# Patient Record
Sex: MALE | Race: WHITE | NOT HISPANIC OR LATINO | Employment: FULL TIME | URBAN - METROPOLITAN AREA
[De-identification: names, ages, dates, MRNs, and addresses within clinical notes are randomized per-mention and may not be internally consistent; named-entity substitution may affect disease eponyms.]

---

## 2017-01-01 ENCOUNTER — COMMUNICATION - HEALTHEAST (OUTPATIENT)
Dept: ONCOLOGY | Facility: HOSPITAL | Age: 32
End: 2017-01-01

## 2017-01-01 ENCOUNTER — INFUSION - HEALTHEAST (OUTPATIENT)
Dept: INFUSION THERAPY | Facility: CLINIC | Age: 32
End: 2017-01-01

## 2017-01-01 ENCOUNTER — OFFICE VISIT - HEALTHEAST (OUTPATIENT)
Dept: RADIATION ONCOLOGY | Age: 32
End: 2017-01-01

## 2017-01-01 ENCOUNTER — OFFICE VISIT - HEALTHEAST (OUTPATIENT)
Dept: ONCOLOGY | Facility: HOSPITAL | Age: 32
End: 2017-01-01

## 2017-01-01 ENCOUNTER — AMBULATORY - HEALTHEAST (OUTPATIENT)
Dept: ONCOLOGY | Facility: CLINIC | Age: 32
End: 2017-01-01

## 2017-01-01 ENCOUNTER — COMMUNICATION - HEALTHEAST (OUTPATIENT)
Dept: FAMILY MEDICINE | Facility: CLINIC | Age: 32
End: 2017-01-01

## 2017-01-01 ENCOUNTER — HOSPITAL ENCOUNTER (OUTPATIENT)
Dept: CT IMAGING | Facility: CLINIC | Age: 32
Discharge: HOME | End: 2017-08-30
Attending: INTERNAL MEDICINE

## 2017-01-01 ENCOUNTER — RECORDS - HEALTHEAST (OUTPATIENT)
Dept: ADMINISTRATIVE | Facility: OTHER | Age: 32
End: 2017-01-01

## 2017-01-01 ENCOUNTER — AMBULATORY - HEALTHEAST (OUTPATIENT)
Dept: ONCOLOGY | Facility: HOSPITAL | Age: 32
End: 2017-01-01

## 2017-01-01 ENCOUNTER — AMBULATORY - HEALTHEAST (OUTPATIENT)
Dept: INFUSION THERAPY | Facility: CLINIC | Age: 32
End: 2017-01-01

## 2017-01-01 ENCOUNTER — COMMUNICATION - HEALTHEAST (OUTPATIENT)
Dept: ONCOLOGY | Facility: CLINIC | Age: 32
End: 2017-01-01

## 2017-01-01 ENCOUNTER — COMMUNICATION - HEALTHEAST (OUTPATIENT)
Dept: ADMINISTRATIVE | Facility: HOSPITAL | Age: 32
End: 2017-01-01

## 2017-01-01 ENCOUNTER — OFFICE VISIT - HEALTHEAST (OUTPATIENT)
Dept: FAMILY MEDICINE | Facility: CLINIC | Age: 32
End: 2017-01-01

## 2017-01-01 ENCOUNTER — AMBULATORY - HEALTHEAST (OUTPATIENT)
Dept: RADIATION ONCOLOGY | Facility: HOSPITAL | Age: 32
End: 2017-01-01

## 2017-01-01 ENCOUNTER — HOSPITAL ENCOUNTER (OUTPATIENT)
Dept: PET IMAGING | Facility: HOSPITAL | Age: 32
Discharge: HOME | End: 2017-06-07
Attending: INTERNAL MEDICINE

## 2017-01-01 ENCOUNTER — OFFICE VISIT - HEALTHEAST (OUTPATIENT)
Dept: ONCOLOGY | Facility: CLINIC | Age: 32
End: 2017-01-01

## 2017-01-01 ENCOUNTER — HOSPITAL ENCOUNTER (OUTPATIENT)
Dept: MRI IMAGING | Facility: HOSPITAL | Age: 32
Discharge: HOME | End: 2017-06-07
Attending: INTERNAL MEDICINE

## 2017-01-01 ENCOUNTER — COMMUNICATION - HEALTHEAST (OUTPATIENT)
Dept: INFUSION THERAPY | Facility: CLINIC | Age: 32
End: 2017-01-01

## 2017-01-01 ENCOUNTER — SURGERY - HEALTHEAST (OUTPATIENT)
Dept: SURGERY | Facility: CLINIC | Age: 32
End: 2017-01-01

## 2017-01-01 ENCOUNTER — COMMUNICATION - HEALTHEAST (OUTPATIENT)
Dept: RADIATION ONCOLOGY | Age: 32
End: 2017-01-01

## 2017-01-01 ENCOUNTER — OFFICE VISIT - HEALTHEAST (OUTPATIENT)
Dept: OTOLARYNGOLOGY | Facility: CLINIC | Age: 32
End: 2017-01-01

## 2017-01-01 ENCOUNTER — INFUSION - HEALTHEAST (OUTPATIENT)
Dept: INFUSION THERAPY | Facility: HOSPITAL | Age: 32
End: 2017-01-01

## 2017-01-01 ENCOUNTER — HOSPITAL ENCOUNTER (OUTPATIENT)
Dept: MRI IMAGING | Facility: CLINIC | Age: 32
Discharge: HOME | End: 2017-02-22
Attending: RADIOLOGY

## 2017-01-01 ENCOUNTER — AMBULATORY - HEALTHEAST (OUTPATIENT)
Dept: INFUSION THERAPY | Facility: HOSPITAL | Age: 32
End: 2017-01-01

## 2017-01-01 ENCOUNTER — HOSPITAL ENCOUNTER (OUTPATIENT)
Dept: MRI IMAGING | Facility: CLINIC | Age: 32
Discharge: HOME | End: 2017-09-18
Attending: RADIOLOGY

## 2017-01-01 ENCOUNTER — HOSPITAL ENCOUNTER (OUTPATIENT)
Dept: CT IMAGING | Facility: CLINIC | Age: 32
Discharge: HOME | End: 2017-09-18
Attending: RADIOLOGY

## 2017-01-01 ENCOUNTER — OFFICE VISIT - HEALTHEAST (OUTPATIENT)
Dept: ENDOCRINOLOGY | Facility: CLINIC | Age: 32
End: 2017-01-01

## 2017-01-01 ENCOUNTER — HOSPITAL ENCOUNTER (OUTPATIENT)
Dept: MRI IMAGING | Facility: CLINIC | Age: 32
Setting detail: RADIATION/ONCOLOGY SERIES
Discharge: STILL ADMITTED OR ADMINISTRATIVE | End: 2017-08-01
Attending: RADIOLOGY

## 2017-01-01 ENCOUNTER — HOSPITAL ENCOUNTER (OUTPATIENT)
Dept: MRI IMAGING | Facility: CLINIC | Age: 32
Discharge: HOME | End: 2017-11-27
Attending: RADIOLOGY

## 2017-01-01 ENCOUNTER — ANESTHESIA - HEALTHEAST (OUTPATIENT)
Dept: SURGERY | Facility: CLINIC | Age: 32
End: 2017-01-01

## 2017-01-01 ENCOUNTER — OFFICE VISIT - HEALTHEAST (OUTPATIENT)
Dept: RADIATION ONCOLOGY | Facility: CLINIC | Age: 32
End: 2017-01-01

## 2017-01-01 ENCOUNTER — COMMUNICATION - HEALTHEAST (OUTPATIENT)
Dept: ENDOCRINOLOGY | Facility: CLINIC | Age: 32
End: 2017-01-01

## 2017-01-01 ENCOUNTER — AMBULATORY - HEALTHEAST (OUTPATIENT)
Dept: RADIATION ONCOLOGY | Age: 32
End: 2017-01-01

## 2017-01-01 ENCOUNTER — COMMUNICATION - HEALTHEAST (OUTPATIENT)
Dept: OTOLARYNGOLOGY | Facility: CLINIC | Age: 32
End: 2017-01-01

## 2017-01-01 ENCOUNTER — HOSPITAL ENCOUNTER (OUTPATIENT)
Dept: CT IMAGING | Facility: HOSPITAL | Age: 32
Setting detail: RADIATION/ONCOLOGY SERIES
Discharge: STILL ADMITTED OR ADMINISTRATIVE | End: 2017-11-22
Attending: INTERNAL MEDICINE

## 2017-01-01 ENCOUNTER — OFFICE VISIT - HEALTHEAST (OUTPATIENT)
Dept: RADIATION ONCOLOGY | Facility: HOSPITAL | Age: 32
End: 2017-01-01

## 2017-01-01 ENCOUNTER — COMMUNICATION - HEALTHEAST (OUTPATIENT)
Dept: RADIATION ONCOLOGY | Facility: CLINIC | Age: 32
End: 2017-01-01

## 2017-01-01 ENCOUNTER — COMMUNICATION - HEALTHEAST (OUTPATIENT)
Dept: SCHEDULING | Facility: CLINIC | Age: 32
End: 2017-01-01

## 2017-01-01 ENCOUNTER — RECORDS - HEALTHEAST (OUTPATIENT)
Dept: GENERAL RADIOLOGY | Facility: CLINIC | Age: 32
End: 2017-01-01

## 2017-01-01 ENCOUNTER — COMMUNICATION - HEALTHEAST (OUTPATIENT)
Dept: INFUSION THERAPY | Facility: HOSPITAL | Age: 32
End: 2017-01-01

## 2017-01-01 ENCOUNTER — AMBULATORY - HEALTHEAST (OUTPATIENT)
Dept: RADIATION ONCOLOGY | Facility: CLINIC | Age: 32
End: 2017-01-01

## 2017-01-01 DIAGNOSIS — T45.1X5A CHEMOTHERAPY INDUCED NEUTROPENIA (H): ICD-10-CM

## 2017-01-01 DIAGNOSIS — C34.90 NON-SMALL CELL LUNG CANCER, UNSPECIFIED LATERALITY (H): ICD-10-CM

## 2017-01-01 DIAGNOSIS — C34.90 MALIGNANT NEOPLASM OF LUNG, UNSPECIFIED LATERALITY, UNSPECIFIED PART OF LUNG (H): ICD-10-CM

## 2017-01-01 DIAGNOSIS — C79.31 BRAIN METASTASIS: ICD-10-CM

## 2017-01-01 DIAGNOSIS — E27.40 ADRENAL INSUFFICIENCY (H): ICD-10-CM

## 2017-01-01 DIAGNOSIS — E03.2 HYPOTHYROIDISM DUE TO MEDICATION: ICD-10-CM

## 2017-01-01 DIAGNOSIS — C34.91 NON-SMALL CELL CANCER OF RIGHT LUNG (H): ICD-10-CM

## 2017-01-01 DIAGNOSIS — C34.90: ICD-10-CM

## 2017-01-01 DIAGNOSIS — I26.99 PULMONARY EMBOLISM ON RIGHT (H): ICD-10-CM

## 2017-01-01 DIAGNOSIS — G89.3 CANCER RELATED PAIN: ICD-10-CM

## 2017-01-01 DIAGNOSIS — C79.31 METASTASIS TO BRAIN (H): ICD-10-CM

## 2017-01-01 DIAGNOSIS — F32.A DEPRESSION: ICD-10-CM

## 2017-01-01 DIAGNOSIS — C34.91 NON-SMALL CELL LUNG CANCER, RIGHT (H): ICD-10-CM

## 2017-01-01 DIAGNOSIS — C79.49 SECONDARY MALIGNANT NEOPLASM OF BRAIN AND SPINAL CORD (H): ICD-10-CM

## 2017-01-01 DIAGNOSIS — E86.0 DEHYDRATION: ICD-10-CM

## 2017-01-01 DIAGNOSIS — G89.3 CANCER ASSOCIATED PAIN: ICD-10-CM

## 2017-01-01 DIAGNOSIS — C34.91 PRIMARY MALIGNANT NEOPLASM OF RIGHT LUNG METASTATIC TO OTHER SITE (H): ICD-10-CM

## 2017-01-01 DIAGNOSIS — K81.0 CHOLECYSTITIS, ACUTE: ICD-10-CM

## 2017-01-01 DIAGNOSIS — R05.9 COUGH: ICD-10-CM

## 2017-01-01 DIAGNOSIS — R13.19 ESOPHAGEAL DYSPHAGIA: ICD-10-CM

## 2017-01-01 DIAGNOSIS — R09.02 HYPOXIA: ICD-10-CM

## 2017-01-01 DIAGNOSIS — I26.99 PULMONARY EMBOLISM (H): ICD-10-CM

## 2017-01-01 DIAGNOSIS — D64.89 ANEMIA DUE TO OTHER CAUSE: ICD-10-CM

## 2017-01-01 DIAGNOSIS — E83.42 HYPOMAGNESEMIA: ICD-10-CM

## 2017-01-01 DIAGNOSIS — I26.99 OTHER ACUTE PULMONARY EMBOLISM WITHOUT ACUTE COR PULMONALE (H): ICD-10-CM
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
## 2017-01-01 DIAGNOSIS — R06.02 SHORTNESS OF BREATH: ICD-10-CM

## 2017-01-01 DIAGNOSIS — I67.89 RADIATION THERAPY INDUCED BRAIN NECROSIS: ICD-10-CM

## 2017-01-01 DIAGNOSIS — C34.91 LUNG CANCER, PRIMARY, WITH METASTASIS FROM LUNG TO OTHER SITE, RIGHT (H): ICD-10-CM

## 2017-01-01 DIAGNOSIS — R19.7 DIARRHEA: ICD-10-CM

## 2017-01-01 DIAGNOSIS — D70.1 CHEMOTHERAPY INDUCED NEUTROPENIA (H): ICD-10-CM

## 2017-01-01 DIAGNOSIS — K22.2 ESOPHAGEAL STRICTURE: ICD-10-CM

## 2017-01-01 DIAGNOSIS — R65.20 SEVERE SEPSIS (H): ICD-10-CM

## 2017-01-01 DIAGNOSIS — R07.9 CHEST PAIN: ICD-10-CM

## 2017-01-01 DIAGNOSIS — J96.11 CHRONIC RESPIRATORY FAILURE WITH HYPOXIA (H): ICD-10-CM

## 2017-01-01 DIAGNOSIS — D64.9 ANEMIA: ICD-10-CM

## 2017-01-01 DIAGNOSIS — C79.31 SECONDARY MALIGNANT NEOPLASM OF BRAIN AND SPINAL CORD (H): ICD-10-CM

## 2017-01-01 DIAGNOSIS — A04.72 C. DIFFICILE COLITIS: ICD-10-CM

## 2017-01-01 DIAGNOSIS — A41.9 SEVERE SEPSIS (H): ICD-10-CM

## 2017-01-01 DIAGNOSIS — E87.6 HYPOKALEMIA: ICD-10-CM

## 2017-01-01 DIAGNOSIS — Z09 CHEMOTHERAPY FOLLOW-UP EXAMINATION: ICD-10-CM

## 2017-01-01 DIAGNOSIS — C34.90 LUNG CANCER (H): ICD-10-CM

## 2017-01-01 DIAGNOSIS — C78.02 METASTATIC LUNG CARCINOMA, LEFT (H): ICD-10-CM

## 2017-01-01 DIAGNOSIS — R50.9 FEVER: ICD-10-CM

## 2017-01-01 DIAGNOSIS — J18.9 PNEUMONIA: ICD-10-CM

## 2017-01-01 DIAGNOSIS — M25.50 POLYARTHRALGIA: ICD-10-CM

## 2017-01-01 DIAGNOSIS — C79.31 BRAIN METASTASES: ICD-10-CM

## 2017-01-01 DIAGNOSIS — E55.9 VITAMIN D DEFICIENCY: ICD-10-CM

## 2017-01-01 DIAGNOSIS — C34.90 NON-SMALL CELL LUNG CANCER (NSCLC) (H): ICD-10-CM

## 2017-01-01 LAB
BLD PROD TYP BPU: NORMAL
BLOOD EXPIRATION DATE: NORMAL
BLOOD TYPE: 600
CODING SYSTEM: NORMAL
COMPONENT (HISTORICAL CONVERSION): NORMAL
CREAT SERPL-MCNC: 0.82 MG/DL (ref 0.7–1.3)
CREAT SERPL-MCNC: 0.95 MG/DL (ref 0.7–1.3)
CROSSMATCH: NORMAL
GFR SERPL CREATININE-BSD FRML MDRD: >60 ML/MIN/1.73M2
GFR SERPL CREATININE-BSD FRML MDRD: >60 ML/MIN/1.73M2
HBA1C MFR BLD: 5.5 % (ref 3.5–6)
ISSUE DATE AND TIME: NORMAL
STATUS (HISTORICAL CONVERSION): NORMAL
UNIT ABO/RH (HISTORICAL CONVERSION): NORMAL
UNIT NUMBER: NORMAL

## 2017-01-01 ASSESSMENT — MIFFLIN-ST. JEOR
SCORE: 2035.3
SCORE: 2112.98
SCORE: 2101.52
SCORE: 2107.87
SCORE: 2078.84
SCORE: 2136
SCORE: 2098.8
SCORE: 2058.88
SCORE: 2139.91
SCORE: 2117.4
SCORE: 1969.98

## 2017-01-03 ENCOUNTER — AMBULATORY - HEALTHEAST (OUTPATIENT)
Dept: ONCOLOGY | Facility: HOSPITAL | Age: 32
End: 2017-01-03

## 2017-01-03 DIAGNOSIS — C79.31 BRAIN METASTASIS: ICD-10-CM

## 2017-01-03 DIAGNOSIS — C34.91 NON-SMALL CELL CANCER OF RIGHT LUNG (H): ICD-10-CM

## 2017-01-04 ENCOUNTER — INFUSION - HEALTHEAST (OUTPATIENT)
Dept: INFUSION THERAPY | Facility: HOSPITAL | Age: 32
End: 2017-01-04

## 2017-01-04 ENCOUNTER — HOSPITAL ENCOUNTER (OUTPATIENT)
Dept: RADIOLOGY | Facility: HOSPITAL | Age: 32
Discharge: HOME | End: 2017-01-04
Attending: INTERNAL MEDICINE

## 2017-01-04 ENCOUNTER — OFFICE VISIT - HEALTHEAST (OUTPATIENT)
Dept: ONCOLOGY | Facility: HOSPITAL | Age: 32
End: 2017-01-04

## 2017-01-04 DIAGNOSIS — C34.91 NON-SMALL CELL CANCER OF RIGHT LUNG (H): ICD-10-CM

## 2017-01-04 DIAGNOSIS — R05.9 COUGH: ICD-10-CM

## 2017-01-04 DIAGNOSIS — C79.31 BRAIN METASTASIS: ICD-10-CM

## 2017-01-05 ENCOUNTER — INFUSION - HEALTHEAST (OUTPATIENT)
Dept: INFUSION THERAPY | Facility: HOSPITAL | Age: 32
End: 2017-01-05

## 2017-01-05 DIAGNOSIS — C79.31 BRAIN METASTASIS: ICD-10-CM

## 2017-01-05 DIAGNOSIS — C34.91 NON-SMALL CELL CANCER OF RIGHT LUNG (H): ICD-10-CM

## 2017-01-06 ENCOUNTER — AMBULATORY - HEALTHEAST (OUTPATIENT)
Dept: ONCOLOGY | Facility: CLINIC | Age: 32
End: 2017-01-06

## 2017-01-06 ENCOUNTER — INFUSION - HEALTHEAST (OUTPATIENT)
Dept: INFUSION THERAPY | Facility: CLINIC | Age: 32
End: 2017-01-06

## 2017-01-06 DIAGNOSIS — C34.91 NON-SMALL CELL CANCER OF RIGHT LUNG (H): ICD-10-CM

## 2017-01-06 DIAGNOSIS — C79.31 BRAIN METASTASIS: ICD-10-CM

## 2017-01-07 ENCOUNTER — INFUSION - HEALTHEAST (OUTPATIENT)
Dept: INFUSION THERAPY | Facility: HOSPITAL | Age: 32
End: 2017-01-07

## 2017-01-07 DIAGNOSIS — C34.91 NON-SMALL CELL CANCER OF RIGHT LUNG (H): ICD-10-CM

## 2017-01-07 DIAGNOSIS — C79.31 BRAIN METASTASIS: ICD-10-CM

## 2017-01-07 ASSESSMENT — MIFFLIN-ST. JEOR
SCORE: 2157.77
SCORE: 2157.77

## 2017-01-13 ENCOUNTER — INFUSION - HEALTHEAST (OUTPATIENT)
Dept: INFUSION THERAPY | Facility: CLINIC | Age: 32
End: 2017-01-13

## 2017-01-13 ENCOUNTER — OFFICE VISIT - HEALTHEAST (OUTPATIENT)
Dept: ONCOLOGY | Facility: CLINIC | Age: 32
End: 2017-01-13

## 2017-01-13 ENCOUNTER — AMBULATORY - HEALTHEAST (OUTPATIENT)
Dept: INFUSION THERAPY | Facility: CLINIC | Age: 32
End: 2017-01-13

## 2017-01-13 DIAGNOSIS — E03.2 HYPOTHYROIDISM DUE TO MEDICATION: ICD-10-CM

## 2017-01-13 DIAGNOSIS — E83.42 HYPOMAGNESEMIA: ICD-10-CM

## 2017-01-13 DIAGNOSIS — C34.91 NON-SMALL CELL CANCER OF RIGHT LUNG (H): ICD-10-CM

## 2017-01-13 DIAGNOSIS — T45.1X5A CHEMOTHERAPY INDUCED NEUTROPENIA (H): ICD-10-CM

## 2017-01-13 DIAGNOSIS — D70.1 CHEMOTHERAPY INDUCED NEUTROPENIA (H): ICD-10-CM

## 2017-01-13 DIAGNOSIS — C34.90 SMALL CELL LUNG CANCER, UNSPECIFIED LATERALITY (H): ICD-10-CM

## 2017-01-13 DIAGNOSIS — I26.99 PULMONARY EMBOLISM ON RIGHT (H): ICD-10-CM

## 2017-01-13 DIAGNOSIS — C79.31 BRAIN METASTASIS: ICD-10-CM

## 2017-01-13 DIAGNOSIS — E03.8 OTHER SPECIFIED HYPOTHYROIDISM: ICD-10-CM

## 2017-01-30 ENCOUNTER — AMBULATORY - HEALTHEAST (OUTPATIENT)
Dept: ONCOLOGY | Facility: CLINIC | Age: 32
End: 2017-01-30

## 2017-01-30 ENCOUNTER — OFFICE VISIT - HEALTHEAST (OUTPATIENT)
Dept: ONCOLOGY | Facility: CLINIC | Age: 32
End: 2017-01-30

## 2017-01-30 ENCOUNTER — AMBULATORY - HEALTHEAST (OUTPATIENT)
Dept: INFUSION THERAPY | Facility: CLINIC | Age: 32
End: 2017-01-30

## 2017-01-30 ENCOUNTER — INFUSION - HEALTHEAST (OUTPATIENT)
Dept: INFUSION THERAPY | Facility: CLINIC | Age: 32
End: 2017-01-30

## 2017-01-30 DIAGNOSIS — T45.1X5A CHEMOTHERAPY INDUCED NEUTROPENIA (H): ICD-10-CM

## 2017-01-30 DIAGNOSIS — E03.2 HYPOTHYROIDISM DUE TO MEDICATION: ICD-10-CM

## 2017-01-30 DIAGNOSIS — D70.1 CHEMOTHERAPY INDUCED NEUTROPENIA (H): ICD-10-CM

## 2017-01-30 DIAGNOSIS — I26.99 OTHER ACUTE PULMONARY EMBOLISM WITHOUT ACUTE COR PULMONALE (H): ICD-10-CM

## 2017-01-30 DIAGNOSIS — E83.42 HYPOMAGNESEMIA: ICD-10-CM

## 2017-01-30 DIAGNOSIS — C79.31 BRAIN METASTASIS: ICD-10-CM

## 2017-01-30 DIAGNOSIS — C34.91 NON-SMALL CELL CANCER OF RIGHT LUNG (H): ICD-10-CM

## 2017-01-30 ASSESSMENT — MIFFLIN-ST. JEOR: SCORE: 2157.77

## 2017-01-31 ENCOUNTER — INFUSION - HEALTHEAST (OUTPATIENT)
Dept: INFUSION THERAPY | Facility: CLINIC | Age: 32
End: 2017-01-31

## 2017-01-31 DIAGNOSIS — C34.91 NON-SMALL CELL CANCER OF RIGHT LUNG (H): ICD-10-CM

## 2017-01-31 DIAGNOSIS — C79.31 BRAIN METASTASIS: ICD-10-CM

## 2017-02-01 ENCOUNTER — AMBULATORY - HEALTHEAST (OUTPATIENT)
Dept: ONCOLOGY | Facility: CLINIC | Age: 32
End: 2017-02-01

## 2017-02-01 ENCOUNTER — INFUSION - HEALTHEAST (OUTPATIENT)
Dept: INFUSION THERAPY | Facility: CLINIC | Age: 32
End: 2017-02-01

## 2017-02-01 DIAGNOSIS — C79.31 BRAIN METASTASIS: ICD-10-CM

## 2017-02-01 DIAGNOSIS — C34.91 NON-SMALL CELL CANCER OF RIGHT LUNG (H): ICD-10-CM

## 2017-02-02 ENCOUNTER — INFUSION - HEALTHEAST (OUTPATIENT)
Dept: INFUSION THERAPY | Facility: CLINIC | Age: 32
End: 2017-02-02

## 2017-02-02 ENCOUNTER — COMMUNICATION - HEALTHEAST (OUTPATIENT)
Dept: ONCOLOGY | Facility: CLINIC | Age: 32
End: 2017-02-02

## 2017-02-02 DIAGNOSIS — C34.91 NON-SMALL CELL CANCER OF RIGHT LUNG (H): ICD-10-CM

## 2017-02-02 DIAGNOSIS — C79.31 BRAIN METASTASIS: ICD-10-CM

## 2017-02-03 ENCOUNTER — INFUSION - HEALTHEAST (OUTPATIENT)
Dept: INFUSION THERAPY | Facility: CLINIC | Age: 32
End: 2017-02-03

## 2017-02-03 ENCOUNTER — AMBULATORY - HEALTHEAST (OUTPATIENT)
Dept: ONCOLOGY | Facility: CLINIC | Age: 32
End: 2017-02-03

## 2017-02-03 ENCOUNTER — COMMUNICATION - HEALTHEAST (OUTPATIENT)
Dept: ONCOLOGY | Facility: CLINIC | Age: 32
End: 2017-02-03

## 2017-02-03 DIAGNOSIS — C79.31 BRAIN METASTASIS: ICD-10-CM

## 2017-02-03 DIAGNOSIS — C34.91 NON-SMALL CELL CANCER OF RIGHT LUNG (H): ICD-10-CM

## 2017-02-03 DIAGNOSIS — I26.99 OTHER ACUTE PULMONARY EMBOLISM WITHOUT ACUTE COR PULMONALE (H): ICD-10-CM

## 2017-02-03 DIAGNOSIS — I26.99 PULMONARY EMBOLISM ON RIGHT (H): ICD-10-CM

## 2017-02-04 ENCOUNTER — INFUSION - HEALTHEAST (OUTPATIENT)
Dept: INFUSION THERAPY | Facility: CLINIC | Age: 32
End: 2017-02-04

## 2017-02-04 DIAGNOSIS — C34.91 NON-SMALL CELL CANCER OF RIGHT LUNG (H): ICD-10-CM

## 2017-02-04 DIAGNOSIS — C79.31 BRAIN METASTASIS: ICD-10-CM

## 2017-02-05 ENCOUNTER — INFUSION - HEALTHEAST (OUTPATIENT)
Dept: INFUSION THERAPY | Facility: CLINIC | Age: 32
End: 2017-02-05

## 2017-02-05 DIAGNOSIS — C34.91 NON-SMALL CELL CANCER OF RIGHT LUNG (H): ICD-10-CM

## 2017-02-05 DIAGNOSIS — C79.31 BRAIN METASTASIS: ICD-10-CM

## 2017-02-06 ENCOUNTER — AMBULATORY - HEALTHEAST (OUTPATIENT)
Dept: ONCOLOGY | Facility: CLINIC | Age: 32
End: 2017-02-06

## 2017-02-06 ENCOUNTER — INFUSION - HEALTHEAST (OUTPATIENT)
Dept: INFUSION THERAPY | Facility: CLINIC | Age: 32
End: 2017-02-06

## 2017-02-06 DIAGNOSIS — C34.91 NON-SMALL CELL CANCER OF RIGHT LUNG (H): ICD-10-CM

## 2017-02-06 DIAGNOSIS — I26.99 PULMONARY EMBOLISM ON RIGHT (H): ICD-10-CM

## 2017-02-06 DIAGNOSIS — I26.99 OTHER ACUTE PULMONARY EMBOLISM WITHOUT ACUTE COR PULMONALE (H): ICD-10-CM

## 2017-02-06 DIAGNOSIS — C79.31 BRAIN METASTASIS: ICD-10-CM

## 2017-02-08 ENCOUNTER — INFUSION - HEALTHEAST (OUTPATIENT)
Dept: INFUSION THERAPY | Facility: CLINIC | Age: 32
End: 2017-02-08

## 2017-02-08 ENCOUNTER — AMBULATORY - HEALTHEAST (OUTPATIENT)
Dept: ONCOLOGY | Facility: CLINIC | Age: 32
End: 2017-02-08

## 2017-02-08 ENCOUNTER — OFFICE VISIT - HEALTHEAST (OUTPATIENT)
Dept: ONCOLOGY | Facility: CLINIC | Age: 32
End: 2017-02-08

## 2017-02-08 ENCOUNTER — AMBULATORY - HEALTHEAST (OUTPATIENT)
Dept: INFUSION THERAPY | Facility: CLINIC | Age: 32
End: 2017-02-08

## 2017-02-08 DIAGNOSIS — I26.99 PULMONARY EMBOLISM (H): ICD-10-CM

## 2017-02-08 DIAGNOSIS — I26.99 PULMONARY EMBOLISM ON RIGHT (H): ICD-10-CM

## 2017-02-08 DIAGNOSIS — T45.1X5A CHEMOTHERAPY INDUCED NEUTROPENIA (H): ICD-10-CM

## 2017-02-08 DIAGNOSIS — D70.1 CHEMOTHERAPY INDUCED NEUTROPENIA (H): ICD-10-CM

## 2017-02-08 DIAGNOSIS — C79.31 BRAIN METASTASIS: ICD-10-CM

## 2017-02-08 DIAGNOSIS — E83.42 HYPOMAGNESEMIA: ICD-10-CM

## 2017-02-08 DIAGNOSIS — C34.91 NON-SMALL CELL CANCER OF RIGHT LUNG (H): ICD-10-CM

## 2017-02-10 ENCOUNTER — COMMUNICATION - HEALTHEAST (OUTPATIENT)
Dept: ONCOLOGY | Facility: CLINIC | Age: 32
End: 2017-02-10

## 2017-02-10 ENCOUNTER — AMBULATORY - HEALTHEAST (OUTPATIENT)
Dept: ONCOLOGY | Facility: CLINIC | Age: 32
End: 2017-02-10

## 2017-02-10 ENCOUNTER — AMBULATORY - HEALTHEAST (OUTPATIENT)
Dept: INFUSION THERAPY | Facility: CLINIC | Age: 32
End: 2017-02-10

## 2017-02-10 DIAGNOSIS — I26.99 OTHER ACUTE PULMONARY EMBOLISM WITHOUT ACUTE COR PULMONALE (H): ICD-10-CM

## 2017-02-10 DIAGNOSIS — I26.99 PULMONARY EMBOLISM ON RIGHT (H): ICD-10-CM

## 2017-02-10 DIAGNOSIS — I26.99 PULMONARY EMBOLI (H): ICD-10-CM

## 2017-02-14 ENCOUNTER — AMBULATORY - HEALTHEAST (OUTPATIENT)
Dept: ONCOLOGY | Facility: CLINIC | Age: 32
End: 2017-02-14

## 2017-02-14 ENCOUNTER — AMBULATORY - HEALTHEAST (OUTPATIENT)
Dept: INFUSION THERAPY | Facility: CLINIC | Age: 32
End: 2017-02-14

## 2017-02-14 DIAGNOSIS — I26.99 PULMONARY EMBOLISM ON RIGHT (H): ICD-10-CM

## 2017-02-14 DIAGNOSIS — I26.99 OTHER ACUTE PULMONARY EMBOLISM WITHOUT ACUTE COR PULMONALE (H): ICD-10-CM

## 2018-01-01 ENCOUNTER — COMMUNICATION - HEALTHEAST (OUTPATIENT)
Dept: ONCOLOGY | Facility: HOSPITAL | Age: 33
End: 2018-01-01

## 2018-01-01 ENCOUNTER — COMMUNICATION - HEALTHEAST (OUTPATIENT)
Dept: NURSING | Facility: CLINIC | Age: 33
End: 2018-01-01

## 2018-01-01 ENCOUNTER — COMMUNICATION - HEALTHEAST (OUTPATIENT)
Dept: ADMINISTRATIVE | Facility: HOSPITAL | Age: 33
End: 2018-01-01

## 2018-01-01 ENCOUNTER — AMBULATORY - HEALTHEAST (OUTPATIENT)
Dept: INFUSION THERAPY | Facility: HOSPITAL | Age: 33
End: 2018-01-01

## 2018-01-01 ENCOUNTER — OFFICE VISIT - HEALTHEAST (OUTPATIENT)
Dept: ONCOLOGY | Facility: HOSPITAL | Age: 33
End: 2018-01-01

## 2018-01-01 ENCOUNTER — AMBULATORY - HEALTHEAST (OUTPATIENT)
Dept: RADIATION ONCOLOGY | Facility: HOSPITAL | Age: 33
End: 2018-01-01

## 2018-01-01 ENCOUNTER — INFUSION - HEALTHEAST (OUTPATIENT)
Dept: INFUSION THERAPY | Facility: HOSPITAL | Age: 33
End: 2018-01-01

## 2018-01-01 ENCOUNTER — COMMUNICATION - HEALTHEAST (OUTPATIENT)
Dept: ONCOLOGY | Facility: CLINIC | Age: 33
End: 2018-01-01

## 2018-01-01 ENCOUNTER — HOSPITAL ENCOUNTER (OUTPATIENT)
Dept: ULTRASOUND IMAGING | Facility: HOSPITAL | Age: 33
Setting detail: RADIATION/ONCOLOGY SERIES
Discharge: STILL ADMITTED OR ADMINISTRATIVE | End: 2018-01-08
Attending: INTERNAL MEDICINE

## 2018-01-01 DIAGNOSIS — F41.9 ANXIETY: ICD-10-CM

## 2018-01-01 DIAGNOSIS — C34.90 NON-SMALL CELL LUNG CANCER, UNSPECIFIED LATERALITY (H): ICD-10-CM

## 2018-01-01 DIAGNOSIS — C79.31 BRAIN METASTASES: ICD-10-CM

## 2018-01-01 DIAGNOSIS — G89.3 CANCER RELATED PAIN: ICD-10-CM

## 2018-01-01 DIAGNOSIS — E03.2 HYPOTHYROIDISM DUE TO MEDICATION: ICD-10-CM

## 2018-01-01 DIAGNOSIS — C34.90 PRIMARY MALIGNANT NEOPLASM OF LUNG METASTATIC TO OTHER SITE, UNSPECIFIED LATERALITY (H): ICD-10-CM

## 2018-01-01 DIAGNOSIS — G89.3 CANCER ASSOCIATED PAIN: ICD-10-CM

## 2018-01-01 LAB
ALBUMIN SERPL-MCNC: 2.3 G/DL (ref 3.5–5)
ALP SERPL-CCNC: 495 U/L (ref 45–120)
ALT SERPL W P-5'-P-CCNC: 57 U/L (ref 0–45)
ANION GAP SERPL CALCULATED.3IONS-SCNC: 12 MMOL/L (ref 5–18)
AST SERPL W P-5'-P-CCNC: 77 U/L (ref 0–40)
BASOPHILS # BLD AUTO: 0 THOU/UL (ref 0–0.2)
BASOPHILS NFR BLD AUTO: 0 % (ref 0–2)
BILIRUB SERPL-MCNC: 3.1 MG/DL (ref 0–1)
BUN SERPL-MCNC: 11 MG/DL (ref 8–22)
CALCIUM SERPL-MCNC: 9.5 MG/DL (ref 8.5–10.5)
CHLORIDE BLD-SCNC: 89 MMOL/L (ref 98–107)
CO2 SERPL-SCNC: 36 MMOL/L (ref 22–31)
CREAT SERPL-MCNC: 0.66 MG/DL (ref 0.7–1.3)
EOSINOPHIL # BLD AUTO: 0 THOU/UL (ref 0–0.4)
EOSINOPHIL NFR BLD AUTO: 0 % (ref 0–6)
ERYTHROCYTE [DISTWIDTH] IN BLOOD BY AUTOMATED COUNT: 17.1 % (ref 11–14.5)
GFR SERPL CREATININE-BSD FRML MDRD: >60 ML/MIN/1.73M2
GLUCOSE BLD-MCNC: 99 MG/DL (ref 70–125)
HCT VFR BLD AUTO: 30.3 % (ref 40–54)
HGB BLD-MCNC: 9.3 G/DL (ref 14–18)
LYMPHOCYTES # BLD AUTO: 0.7 THOU/UL (ref 0.8–4.4)
LYMPHOCYTES NFR BLD AUTO: 11 % (ref 20–40)
MCH RBC QN AUTO: 25.2 PG (ref 27–34)
MCHC RBC AUTO-ENTMCNC: 30.7 G/DL (ref 32–36)
MCV RBC AUTO: 82 FL (ref 80–100)
MONOCYTES # BLD AUTO: 0.8 THOU/UL (ref 0–0.9)
MONOCYTES NFR BLD AUTO: 11 % (ref 2–10)
NEUTROPHILS # BLD AUTO: 5.4 THOU/UL (ref 2–7.7)
NEUTROPHILS NFR BLD AUTO: 78 % (ref 50–70)
PLATELET # BLD AUTO: 214 THOU/UL (ref 140–440)
PMV BLD AUTO: 9.6 FL (ref 8.5–12.5)
POTASSIUM BLD-SCNC: 3.7 MMOL/L (ref 3.5–5)
PROT SERPL-MCNC: 7 G/DL (ref 6–8)
RBC # BLD AUTO: 3.69 MILL/UL (ref 4.4–6.2)
SODIUM SERPL-SCNC: 137 MMOL/L (ref 136–145)
WBC: 7 THOU/UL (ref 4–11)

## 2018-02-18 ENCOUNTER — COMMUNICATION - HEALTHEAST (OUTPATIENT)
Dept: FAMILY MEDICINE | Facility: CLINIC | Age: 33
End: 2018-02-18

## 2018-02-26 ENCOUNTER — COMMUNICATION - HEALTHEAST (OUTPATIENT)
Dept: ONCOLOGY | Facility: HOSPITAL | Age: 33
End: 2018-02-26

## 2018-03-05 ENCOUNTER — AMBULATORY - HEALTHEAST (OUTPATIENT)
Dept: ONCOLOGY | Facility: HOSPITAL | Age: 33
End: 2018-03-05

## 2021-05-30 VITALS — BODY MASS INDEX: 33.8 KG/M2 | WEIGHT: 256.2 LBS

## 2021-05-30 VITALS — WEIGHT: 267 LBS | BODY MASS INDEX: 35.23 KG/M2

## 2021-05-30 VITALS — WEIGHT: 259.6 LBS | BODY MASS INDEX: 34.25 KG/M2

## 2021-05-30 VITALS — WEIGHT: 260 LBS | BODY MASS INDEX: 34.3 KG/M2

## 2021-05-30 VITALS — WEIGHT: 261.31 LBS | BODY MASS INDEX: 34.48 KG/M2

## 2021-05-30 VITALS — HEIGHT: 73 IN | BODY MASS INDEX: 34.59 KG/M2 | WEIGHT: 261 LBS

## 2021-05-30 VITALS — BODY MASS INDEX: 34.59 KG/M2 | HEIGHT: 73 IN | WEIGHT: 261 LBS

## 2021-05-30 VITALS — HEIGHT: 73 IN | WEIGHT: 256.2 LBS | BODY MASS INDEX: 33.95 KG/M2

## 2021-05-30 VITALS — WEIGHT: 251.4 LBS | BODY MASS INDEX: 33.17 KG/M2

## 2021-05-30 VITALS — WEIGHT: 251 LBS | BODY MASS INDEX: 33.12 KG/M2

## 2021-05-30 VITALS — BODY MASS INDEX: 34.57 KG/M2 | WEIGHT: 262 LBS

## 2021-05-30 VITALS — WEIGHT: 250 LBS | BODY MASS INDEX: 32.98 KG/M2

## 2021-05-31 VITALS — WEIGHT: 245.6 LBS | BODY MASS INDEX: 32.4 KG/M2

## 2021-05-31 VITALS — BODY MASS INDEX: 30.48 KG/M2 | WEIGHT: 231 LBS

## 2021-05-31 VITALS — WEIGHT: 252 LBS | BODY MASS INDEX: 33.25 KG/M2

## 2021-05-31 VITALS — BODY MASS INDEX: 28.76 KG/M2 | WEIGHT: 218 LBS

## 2021-05-31 VITALS — BODY MASS INDEX: 31.01 KG/M2 | WEIGHT: 234 LBS | HEIGHT: 73 IN

## 2021-05-31 VITALS — BODY MASS INDEX: 32.46 KG/M2 | WEIGHT: 246 LBS

## 2021-05-31 VITALS — BODY MASS INDEX: 29.1 KG/M2 | HEIGHT: 73 IN | WEIGHT: 219.6 LBS

## 2021-05-31 VITALS — BODY MASS INDEX: 32.89 KG/M2 | WEIGHT: 249.3 LBS

## 2021-05-31 VITALS — HEIGHT: 73 IN | WEIGHT: 243.6 LBS | BODY MASS INDEX: 32.29 KG/M2

## 2021-05-31 VITALS — BODY MASS INDEX: 33.71 KG/M2 | WEIGHT: 255.5 LBS

## 2021-05-31 VITALS — WEIGHT: 239.2 LBS | BODY MASS INDEX: 31.7 KG/M2 | HEIGHT: 73 IN

## 2021-05-31 VITALS — WEIGHT: 220.13 LBS | BODY MASS INDEX: 29.04 KG/M2

## 2021-05-31 VITALS — HEIGHT: 73 IN | BODY MASS INDEX: 32.87 KG/M2 | WEIGHT: 248 LBS

## 2021-05-31 VITALS — BODY MASS INDEX: 30.87 KG/M2 | WEIGHT: 234 LBS

## 2021-05-31 VITALS
WEIGHT: 251.13 LBS | WEIGHT: 257.06 LBS | BODY MASS INDEX: 33.13 KG/M2 | WEIGHT: 250 LBS | BODY MASS INDEX: 32.98 KG/M2 | BODY MASS INDEX: 33.92 KG/M2

## 2021-05-31 VITALS — WEIGHT: 191.8 LBS | BODY MASS INDEX: 25.3 KG/M2

## 2021-05-31 VITALS — WEIGHT: 252.1 LBS | WEIGHT: 252.1 LBS | HEIGHT: 73 IN | BODY MASS INDEX: 33.26 KG/M2 | BODY MASS INDEX: 33.41 KG/M2

## 2021-05-31 VITALS — BODY MASS INDEX: 28.19 KG/M2 | WEIGHT: 213.7 LBS

## 2021-05-31 VITALS — WEIGHT: 242.4 LBS | BODY MASS INDEX: 31.98 KG/M2

## 2021-05-31 VITALS — HEIGHT: 73 IN | BODY MASS INDEX: 32.98 KG/M2 | BODY MASS INDEX: 32.98 KG/M2 | HEIGHT: 73 IN

## 2021-05-31 VITALS — BODY MASS INDEX: 26.44 KG/M2 | WEIGHT: 200.4 LBS

## 2021-05-31 VITALS — BODY MASS INDEX: 33.04 KG/M2 | WEIGHT: 250.4 LBS

## 2021-05-31 VITALS — HEIGHT: 73 IN | BODY MASS INDEX: 32.95 KG/M2 | WEIGHT: 248.6 LBS

## 2021-05-31 VITALS — WEIGHT: 242.3 LBS | BODY MASS INDEX: 31.97 KG/M2

## 2021-05-31 VITALS — WEIGHT: 245 LBS | BODY MASS INDEX: 32.32 KG/M2

## 2021-05-31 VITALS — WEIGHT: 204.5 LBS | BODY MASS INDEX: 26.98 KG/M2

## 2021-06-04 ENCOUNTER — RECORDS - HEALTHEAST (OUTPATIENT)
Dept: ADMINISTRATIVE | Facility: CLINIC | Age: 36
End: 2021-06-04

## 2021-06-05 ENCOUNTER — RECORDS - HEALTHEAST (OUTPATIENT)
Dept: ONCOLOGY | Facility: CLINIC | Age: 36
End: 2021-06-05

## 2021-06-05 DIAGNOSIS — C34.90 MALIGNANT NEOPLASM OF BRONCHUS AND LUNG (H): ICD-10-CM

## 2021-06-08 NOTE — PROGRESS NOTES
Pt came into infusion clinic for Day 1, Cycle 1 of his treatment. Upon arrival pt was found to be extremely SOB with severe cough. . Dr. Hoyos notified and saw pt in infusion chair. CXR ordered. Reviewed by Dr. Hoyos. IV solumedrol and PO Oxycodone given for pain control. Pt also given nebulizer treatment by RT. Per Dr. Hoyos, hold treatment today. Pt is to RTC 1/5 at 9am to get his chemo treatment. Pt was instructed to call clinic or report to ED if symptoms worsen. Pt verbalized understanding of this.

## 2021-06-08 NOTE — PROGRESS NOTES
[facility] Hematology and Oncology Progress Note    Patient: Jack Ricketts  MRN: [id number]  Date of Service: 2/8/2017         Reason for Visit:    D10C2 carboplatin + Etoposide palliative chemotherapy.    Assessment and Plan:    1)  Metastatic mixed small cell and non-small cell lung cancer:     31 y.o.     2012, September - diagnosed with what was thought to be small cell lung cancer, limited stage, located in the mediastinum measuring 10 cm.    Biopsy confirmed CK7 positive, synaptophysin positive chromogranin expressing cells.     09/2012 - initial treatment with chemoradiation therapy (cisplatin and -16). Posttreatment PET scan showed only a minimal response. Rebiopsy revealed a component of nonsmall cell lung cancer as well, EGFR and ALK-1 mutation negative.     06/2013 - referred to Dr. Jose Amezcua, [facility], who recommended Taxotere chemotherapy. The patient opted for radiation therapy (CyberKnife) with radiosensitizing weekly carboplatin and Taxol chemotherapies.      02/2014, PET scan (compared to prior PET scan in November, 2013) revealed subcarinal node hypermetabolism compatible with active metastasis.  March - July completed 6 cycles palliative topotecan chemotherapy.  7/16/2014 PET/CT (compared to 04/28/2014) revealed persistent but decreased subcarinal hypermetabolism with no new hypermetabolic lesions identified. Due to significant fatigue secondary to chemotherapy, he was given a break from treatment. On 09/08/2014 he resumed cycle #7 topotecan with Avastin added. In November, 2014, he was only able to get day 1 of the topotecan and Avastin due to fatigue.     12/2014 - 02/2015 maintenance Avastin.    03/11/15 PET showed recurrent mild focal FDG activity in the subcarinal region of the mediastinum and new 2.3 cm FDG avid lesion in the liver, suspicious for metastatic lung cancer    03/19/15 liver biopsy negative    08/5/15 PET showed progressive disease in a subcarinal lymph  node, enlarging and increasingly FDG avid hepatic metastasis, and renewed tumor metabolism at the primary tumor site in the right hilum.    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     08/19/15 MRI brain - multiple enhancing lesions in the bilateral cerebral hemispheres          9/04/15 completed 2,200 cGy CyberKnife radiosurgery.     8/13-9/21/15 completed 2 cycles Alimta with Avastin    10/12/15 LFT elevation prompted restaging PET showing disease progression.    10/19 - 12/28/15 - 3+ cycles Topotecan with Avastin chemotherapy.    1/13/16 restaging PET - progressive adenopathy and right liver disease.    PD-L1 positive @ 60%.      02/03-07/18/16 - 9 cycles Keytruda     07/11/16 PET - Area of active tumor metabolism in the right hilum has enlarged and increased in FDG activity. A single right hilar lymph node has become moderately FDG avid. No evidence of active tumor metabolism outside of the right hemithorax.    07/26/16 EBUS pathology showed recurrent neuroendocrine carcinoma.      08/15/16 - 3 day history of left sided weakness, occasional speech slurring and headaches yet managed with Tylenol.      08/16/16 brain MRI - showed radiation necrosis.  Began dex 4 mg t.i.d. with food.      08/22 - 10/24/16 - 3 cycles Opdivo.    10/25 - 11/08/16 - hospitalized @ 's ICU with acute respiratory failure - suspected Opdivo related.  Treated emperically for PCP and placed on steroids.  Bronch showed no evidence of malignancy, only mixed inflammatory cells.  No pneumocystis carinii.      11/15/16 MR head - interval decrease in the irregular enhancement as well as the T2 prolongation in the right corona radiata and right periventricular region.  No new enhancing intracranial lesions.    11/21/16 follow up Dr. Dacosta [facility] - head MRI improved.  Off dexamethasone.  Follow up in 3 months with MRI head.    12/15 - 12/20/16 hospitalized with shortness  of breath.  Found to have a PE as well as CT showing progression of his lung cancer.      12/05/16 - D1C1 carboplatin + Etoposide palliative chemotherapy followed by Neulasta.    Significant neutropenia  ( / ).    01/30/17 - D1C2 carboplatin + Etoposide palliative chemotherapy followed by 5 days 480 mcg Neupogen.    02/08/17 CBC - WBC 0.6.  ANC pending.  Plts 76,000.  HgB 7.4.  Recheck CBC this Friday.  5 days Neupogen did not work better than Neulasta.  Will return to Neulasta.  Patient requests injection rather than take-home.    02/08/17 CMP & magnesium - hypoalbuminemia and hypomagnesemia. Otherwise WNL.    02/08/17 - looks and feels quite good.  Tolerated C2 carboplatin + Etoposide palliative chemotherapy quite well subjectively.  Continue Bactrim M, W and Fridays.  If fever > 100.4 he is to present to ED.       02/20/17 - follow up D1C3 carboplatin + Etoposide with Neulasta palliative chemotherapy.  Sooner if concerns arise.    Restage with imaging after C3, sooner if issues or concerns arise.    2) Cough and wheezing:    Not using supplemental 02 any longer.    01/04/17 - started prednisone 20 mg a day.    Wheeze pretty much gone.  Cough greatly improved.    Using inhalers p.r.n.    Continue prednisone taper, decreasing dose to 5 mg daily x 5 days then discontinue if tolerated.    3) Hypothyroid:    Secondary to Pembrolizumab    On replacement.    02/03/17 TSH - 9.88.  Synthroid increased to 175 mcg daily.      Recheck levels monthly.    4) PE:    12/15/16 CTA showed LLL low burden emboli    Lovenox, 1 mg/kg twice a day.    02/01/17 - began transition to Coumadin @ 5 mg daily, increased to 7.5 mg daily on 02/03 due to low INR Friday.      02/08/17 INR 2.54.  Discontinue Lovenox.  Continue Coumadin @ 7.5 mg daily.  Recheck INR Friday.    5)   Emotional:     Previously met with Sara Fischer, psychotherapy.     He doesn't feel the need to regroup at this time.     He continues to have a good support  system with one of his two brothers and his father.    6) Nutrition:    Weight quite stable.    Albumin WNL.      Fair appetite.      Has met with Nutrition Services previously.  Due to his work he feels he has a good grasp on the deficits in his nutrition and does not want to see Nutrition Services again at this time.    He stopped taking Boost daily.      7) Hypomagnesemia:    1.0 today.    Taking SloMag once daily.  Encouraged to increase to twice daily.    Will give 4 grams IV today    ECOG Performance:  0-1    Distress Assessment:  Coping as well as can be expected.           > 25 minutes with > 50% counseling and care coordination    Derrick Corado, CNP    CC: Kvng Ramirez MD    ______________________________________________________________________________    Interim History:    The patient presents looking and feeling quite good.  He stopped using the supplemental 02 a few weeks ago. He doesn't think it is required any longer.  Last month he was restarted on 20 mg prednisone daily for wheezing and cough which has worked very well.  He continues to taper the prednisone @ 5 mg weekly, beginning 5 mg daily today.  His fatigue remains unchanged.  He states his appetite has been good.  He denies much nausea and has had no vomiting with his current treatment.  He continues on albuterol p.r.n.  He denies pain, fever, chills, headaches, visual or mentation disturbance, bowel or bladder issues.  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  If he does not work he will loose his benefits.  His nurse Navigators are assisting him with this.  He may need to go on Social Security.     Review of Systems:    10 point review of systems negative other than what is mentioned in HPI.    Past Histories:    Past Medical History:   Diagnosis Date     Brain metastases      Cough      Hypomagnesemia 12/16/2016     Lung cancer          Past Surgical History:   Procedure Laterality Date     LUNG  BIOPSY       PICC  10/26/2016          PORTACATH PLACEMENT      venous port     THYROID SURGERY         Physical Exam:    Recent Vitals 2/6/2017   /77   Pulse 98   Temp 97.8   Temp src 1   SpO2 -     GENERAL:   Pleasant male.  Alert and oriented x 3.  No acute distress.  Cushingoid symptoms markedly improved.  HEENT:   Anicteric sclerae. EOMI. PERRLA. Oropharynx unremarkable.    LYMPH NODES:  No appreciable adenopathy neck, axilla, groin.   HEART:   S1S2, no murmur heard.   LUNGS:   Clear with diminished breath sounds.  No rhonchi or wheezes.   ABDOMEN:   No appreciable organomegaly, masses or ascites.   EXTREMITIES:  Mild ankle edema resolved.    SKIN:   Folliculitis resolved face, neck and chest.    Lab Results:    Reviewed with patient.    Recent Results (from the past 24 hour(s))   INR   Result Value Ref Range    INR 2.54 (H) 0.90 - 1.10   Magnesium   Result Value Ref Range    Magnesium 1.0 (LL) 1.8 - 2.6 mg/dL   Comprehensive Metabolic Panel   Result Value Ref Range    Sodium 138 136 - 145 mmol/L    Potassium 3.6 3.5 - 5.0 mmol/L    Chloride 102 98 - 107 mmol/L    CO2 25 22 - 31 mmol/L    Anion Gap, Calculation 11 5 - 18 mmol/L    Glucose 107 70 - 125 mg/dL    BUN 21 8 - 22 mg/dL    Creatinine 0.79 0.70 - 1.30 mg/dL    GFR MDRD Af Amer >60 >60 mL/min/1.73m2    GFR MDRD Non Af Amer >60 >60 mL/min/1.73m2    Bilirubin, Total 0.4 0.0 - 1.0 mg/dL    Calcium 8.7 8.5 - 10.5 mg/dL    Protein, Total 5.9 (L) 6.0 - 8.0 g/dL    Albumin 3.4 (L) 3.5 - 5.0 g/dL    Alkaline Phosphatase 50 45 - 120 U/L    AST 9 0 - 40 U/L    ALT 16 0 - 45 U/L   HM1 (CBC with Diff)   Result Value Ref Range    WBC 0.6 (LL) 4.0 - 11.0 thou/uL    RBC 2.60 (L) 4.40 - 6.20 mill/uL    Hemoglobin 7.4 (L) 14.0 - 18.0 g/dL    Hematocrit 22.4 (L) 40.0 - 54.0 %    MCV 86 80 - 100 fL    MCH 28.3 27.0 - 34.0 pg    MCHC 32.9 32.0 - 36.0 g/dL    RDW 20.7 (H) 11.0 - 14.5 %    Platelets 76 (L) 140 - 440 thou/uL    MPV 7.7 7.0 - 10.0 fL          Imaging:    No new imaging.

## 2021-06-08 NOTE — PROGRESS NOTES
Received etoposide without reaction or any problems. Neulasta Onpro applies to L arm, verbal and written instructions given.

## 2021-06-08 NOTE — PROGRESS NOTES
Pt. ambulated into Infusion Care by himself. Pt.is alert and oriented and VSS. Pt has a very harsh and deep cough. Pt. stated that he had a chest x-ray and a nebs treatment yesterday at [facility]. Pt. thinks he is doing better, but that the [facility] cold air outside aggrevated his cough. PORT accessed with excellent blood return. Premeds administered and PPE donned. Chemotherapy double checked with Annemarie Martínez RN . Pt. tolerated infusion of Etoposide without any problems. PORT flushed with Normal Saline and 6 cc Heparin and deaccessed. Pt. Is hoping he can return to work this Sunday. All questions answered and patient will be discharged home.

## 2021-06-08 NOTE — PROGRESS NOTES
I met with Jack today to introduce myself as Gertrude's co-worker and Nurse Navigator.  I gave him my contact information.  Jack is starting chemo again.  He is concerned about the possibility of losing his job due to his health and absence from work.  He said he is starting back this Sunday and will start part time.  He is very concerned about losing his job and his benefits.  I introduced the idea of applying for SSDI.  He was open to looking into applying for SSDI.  I gave him a resource handout and Q and A sheet.  I told him there are services that will apply on his behalf for a fee if it feels too overwhelming for him.  We discussed [employer] and he did recently receive a [employer] Note zachary with Gertrude's help.  I talked with him about [employer], reminding him that's an option for saving money on groceries.  Support and encouragement provided.  Invited calls.

## 2021-06-08 NOTE — PROGRESS NOTES
"Pt admitted per pedis for his injection. Pt with dypnea and tachycardia on admission however subsided in apx 2 minutes after sitting down. States this is nothing unusual and he had to walk outside and then all the way down the [place] so that's \"alot\" States he gets sensitive to the cold. Pt admits that his nausea is much improved and no vomiting today. Main c/o fatigue today but states he has off work until next tue so he can rest. Dcd home after injection.  "

## 2021-06-08 NOTE — PROGRESS NOTES
[facility] Hematology and Oncology Progress Note    Patient: Jack Ricketts  MRN: [id number]  Date of Service: 1/30/2017         Reason for Visit:    D1C2 carboplatin + Etoposide chemotherapy.    Assessment and Plan:    1)  Metastatic mixed small cell and non-small cell lung cancer:     31 y.o.     2012, September - diagnosed with what was thought to be small cell lung cancer, limited stage, located in the mediastinum measuring 10 cm.    Biopsy confirmed CK7 positive, synaptophysin positive chromogranin expressing cells.     09/2012 - initial treatment with chemoradiation therapy (cisplatin and -16). Posttreatment PET scan showed only a minimal response. Rebiopsy revealed a component of nonsmall cell lung cancer as well, EGFR and ALK-1 mutation negative.     06/2013 - referred to Dr. Jose Amezcua, [facility], who recommended Taxotere chemotherapy. The patient opted for radiation therapy (CyberKnife) with radiosensitizing weekly carboplatin and Taxol chemotherapies.      02/2014, PET scan (compared to prior PET scan in November, 2013) revealed subcarinal node hypermetabolism compatible with active metastasis.  March - July completed 6 cycles palliative topotecan chemotherapy.  7/16/2014 PET/CT (compared to 04/28/2014) revealed persistent but decreased subcarinal hypermetabolism with no new hypermetabolic lesions identified. Due to significant fatigue secondary to chemotherapy, he was given a break from treatment. On 09/08/2014 he resumed cycle #7 topotecan with Avastin added. In November, 2014, he was only able to get day 1 of the topotecan and Avastin due to fatigue.     12/2014 - 02/2015 maintenance Avastin.    03/11/15 PET showed recurrent mild focal FDG activity in the subcarinal region of the mediastinum and new 2.3 cm FDG avid lesion in the liver, suspicious for metastatic lung cancer    03/19/15 liver biopsy negative    08/5/15 PET showed progressive disease in a subcarinal lymph node, enlarging  and increasingly FDG avid hepatic metastasis, and renewed tumor metabolism at the primary tumor site in the right hilum.    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     08/19/15 MRI brain - multiple enhancing lesions in the bilateral cerebral hemispheres          9/04/15 completed 2,200 cGy CyberKnife radiosurgery.     8/13-9/21/15 completed 2 cycles Alimta with Avastin    10/12/15 LFT elevation prompted restaging PET showing disease progression.    10/19 - 12/28/15 - 3+ cycles Topotecan with Avastin chemotherapy.    1/13/16 restaging PET - progressive adenopathy and right liver disease.    PD-L1 positive @ 60%.      02/03-07/18/16 - 9 cycles Keytruda     07/11/16 PET - Area of active tumor metabolism in the right hilum has enlarged and increased in FDG activity. A single right hilar lymph node has become moderately FDG avid. No evidence of active tumor metabolism outside of the right hemithorax.    07/26/16 EBUS pathology showed recurrent neuroendocrine carcinoma.      08/15/16 - 3 day history of left sided weakness, occasional speech slurring and headaches yet managed with Tylenol.      08/16/16 brain MRI - showed radiation necrosis.  Began dex 4 mg t.i.d. with food.      08/22 - 10/24/16 - 3 cycles Opdivo.    10/25 - 11/08/16 - hospitalized @ [facility]s ICU with acute respiratory failure - suspected Opdivo related.  Treated emperically for PCP and placed on steroids.  Bronch showed no evidence of malignancy, only mixed inflammatory cells.  No pneumocystis carinii.      11/15/16 MR head - interval decrease in the irregular enhancement as well as the T2 prolongation in the right corona radiata and right periventricular region.  No new enhancing intracranial lesions.    11/21/16 follow up Dr. Dacosta [facility] - head MRI improved.  Off dexamethasone.  Follow up in 3 months with MRI head.    12/15 - 12/20/16 hospitalized with shortness of breath.   Found to have a PE as well as CT showing progression of his lung cancer.      12/05/16 - D1C1 carboplatin + Etoposide palliative chemotherapy.    01/30/17 CBC - WBC, ANC and Plts WNL.  HgB quite stable @ 10.1.    01/30/17 CMP - hypomagnesemia.     01/30/17 - looks and feels quite good.  Tolerated C1 carboplatin + Etoposide palliative chemotherapy reasonably well.  Laboratories acceptable to proceed with D1C2 carboplatin + Etoposide palliative chemotherapy.  Will try daily Neupogen, 480 mcg x 5 days, with this cycle since he was still very neutropenic with Neulasta.  Continue prednisone taper at 5 mg weekly.  Continue Bactrim M, W and Fridays.  If fever > 100.4 he is to present to ED.       02/20/17 - follow up D1C3 carboplatin + Etoposide with Neulasta palliative chemotherapy.  Sooner if concerns arise.    Returned to work part time.  Restage after C3, sooner if issues or concerns arise.    2) Cough and wheezing:    Not using supplemental 02 any longer.    01/04/17 - started prednisone 20 mg a day.  Dose dropped to 15 mg a couple weeks ago.  Continue taper, decreasing dose to 10 mg daily if tolerated.    Wheeze pretty much gone.  Cough greatly improved.    Using inhalers p.r.n.    3) Hypothyroid:    Secondary to Pembrolizumab    On replacement.    01/13/17 TSH - WNL.      Recheck levels monthly.    4) PE:    On Lovenox, 1 mg/kg twice a day.    Will begin transition to Coumadin, 5 mg daily.  Check INR Friday.  Continue Lovenox until told otherwise.    5)   Emotional:     Previously met with Sara Fischer, psychotherapy.     He doesn't feel the need to regroup at this time.     He continues to have a good support system with one of his two brothers and his father.    6) Nutrition:    Weight quite stable.    Albumin WNL.      Fair appetite.      Has met with Nutrition Services previously.  Due to his work he feels he has a good grasp on the deficits in his nutrition and does not want to see Nutrition Services again at  "this time.    He stopped taking Boost daily.      7) Hypomagnesemia:    1.4 today.    Didn't  or start [place].  Encouraged to do so.    Will give 3 grams IV today    ECOG Performance:  0-1    Distress Assessment:  Coping as well as can be expected.           > 25 minutes with > 50% counseling and care coordination    Derrick Corado, CNP    CC: Kvng Ramirez MD    ______________________________________________________________________________    Interim History:    The patient presents looking and feeling quite good.  He stopped usesingthe supplemental 02 a few weeks ago as he doesn't think it is required any longer.  Earlier this month he was restarted on 20 mg prednisone daily for wheezing and cough which has worked very well.  He continues to taper the prednisone 5 mg weekly,decreased to 10 mg today.  His fatigue remains unchanged, he is more active.  He states his appetite has been good and denies much nausea and has had no vomiting with his current therapy.  He continues on albuterol p.r.n.  He denies pain, fever, chills, headaches, visual or mentation disturbance, bowel or bladder issues.  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  If he does not work he will loose his benefits.  His nurse Navigators are assisting him with this.  He may need to go on Social Security.     Review of Systems:    10 point review of systems negative other than what is mentioned in HPI.    Past Histories:    Past Medical History   Diagnosis Date     Brain metastases      Cough      Hypomagnesemia 12/16/2016     Lung cancer          Past Surgical History   Procedure Laterality Date     Thyroid surgery       Lung biopsy       Portacath placement       venous port     Picc  10/26/2016             Physical Exam:    Recent Vitals 1/30/2017   Height 6' 1\"   Weight 261 lbs   BSA (m2) 2.47 m2   /64   Pulse 123   Temp 98.6   Temp src 1   SpO2 94     GENERAL:   Pleasant male.  Alert and oriented x 3.  No acute " distress.  Cushingoid symptoms markedly improved.  HEENT:   Anicteric sclerae. EOMI. PERRLA. Oropharynx unremarkable.    LYMPH NODES:  No appreciable adenopathy neck, axilla, groin.   HEART:   S1S2, no murmur heard.   LUNGS:   Clear with diminished breath sounds.  No rhonchi or wheezes.   ABDOMEN:   No appreciable organomegaly, masses or ascites.   EXTREMITIES:  Mild ankle edema resolved.    SKIN:   Folliculitis resolved face, neck and chest.    Lab Results:    Reviewed with patient.    Recent Results (from the past 24 hour(s))   Magnesium   Result Value Ref Range    Magnesium 1.4 (L) 1.8 - 2.6 mg/dL   Comprehensive Metabolic Panel   Result Value Ref Range    Sodium 141 136 - 145 mmol/L    Potassium 3.7 3.5 - 5.0 mmol/L    Chloride 101 98 - 107 mmol/L    CO2 27 22 - 31 mmol/L    Anion Gap, Calculation 13 5 - 18 mmol/L    Glucose 100 70 - 125 mg/dL    BUN 10 8 - 22 mg/dL    Creatinine 1.10 0.70 - 1.30 mg/dL    GFR MDRD Af Amer >60 >60 mL/min/1.73m2    GFR MDRD Non Af Amer >60 >60 mL/min/1.73m2    Bilirubin, Total 0.5 0.0 - 1.0 mg/dL    Calcium 9.3 8.5 - 10.5 mg/dL    Protein, Total 6.7 6.0 - 8.0 g/dL    Albumin 3.7 3.5 - 5.0 g/dL    Alkaline Phosphatase 63 45 - 120 U/L    AST 12 0 - 40 U/L    ALT 24 0 - 45 U/L   HM1 (CBC with Diff)   Result Value Ref Range    WBC 9.0 4.0 - 11.0 thou/uL    RBC 3.43 (L) 4.40 - 6.20 mill/uL    Hemoglobin 10.1 (L) 14.0 - 18.0 g/dL    Hematocrit 30.6 (L) 40.0 - 54.0 %    MCV 89 80 - 100 fL    MCH 29.4 27.0 - 34.0 pg    MCHC 32.9 32.0 - 36.0 g/dL    RDW 19.3 (H) 11.0 - 14.5 %    Platelets 234 140 - 440 thou/uL    MPV 6.9 (L) 7.0 - 10.0 fL   Manual Differential   Result Value Ref Range    Total Neutrophils % 82 (H) 50 - 70 %    Lymphocytes % 14 (L) 20 - 40 %    Monocytes % 5 2 - 10 %    Eosinophils %  0 0 - 6 %    Basophils % 0 0 - 2 %    Metamyelocytes % 1 <=1 %    Total Neutrophils Absolute 7.3 2.0 - 7.7 thou/ul    Lymphocytes Absolute 1.2 0.8 - 4.4 thou/uL    Monocytes Absolute 0.4 0.0 -  0.9 thou/uL    Eosinophils Absolute 0.0 0.0 - 0.4 thou/uL    Basophils Absolute 0.0 0.0 - 0.2 thou/uL    Metamyelocytes Absolute 0.0 <=0.1 thou/uL    Manual nRBC per 100 Cells 3 (H) 0-<1    Platelet Estimate Normal Normal    Polychromasia 1+ (!) Negative       Imaging:    No new imaging.

## 2021-06-08 NOTE — PROGRESS NOTES
Pt came into infusion clinic for Day 1, Cycle 1 of his treatment. Consent verified. Labs Reviewed. Pt reports that he is taking Prednisone as Dr. Hoyos ordered and is feeling much better today. Medications explained to pt who verbalized understanding. Handouts on Etoposide and Carbo given to pt. Port patent throughout infusion. Pt tolerated infusion with no complications. Pt instructed on when to call the MD after chemo and printed information given. Pt verbalized understanding. Pt will RTC 1/6 at [facility] and 1/7 on P2 for treatment. Pt left infusion clinic via ambulatory.

## 2021-06-08 NOTE — PROGRESS NOTES
Pt. ambulated into Infusion Care after having his PORT drawn in the [facility] and labs drawn. Pt. was also seen by Gabriella Corado NP. Premeds administered and PPE donned. Chemotherapy double checked with Irish Bishop RN. Pt. tolerated infusion of Etoposide and Carboplatin without any problems. Pt. also received an Infusion of 3 gm of Magnesium Sulfate. PORT flushed with Normal Saline and 6 cc Heparin and deaccessed. All questions answered and patient was discharged home

## 2021-06-08 NOTE — PROGRESS NOTES
[facility] Hematology and Oncology Progress Note    Patient: Jack Ricketts  MRN: [id number]  Date of Service:           Reason for visit      1. Non-small cell cancer of right lung         Assessment     1. Jack is very pleasant 31 y.o. gentleman with  non-small cell type of a lung cancer initially stage IIIB and later on developed liver metastases, treated with concurrent chemoradiation therapy with cisplatin and etoposide as a small cell lung cancer then given more radiation and weekly carbo platinum and Taxol.  He had recurrence documented February 2014 and has received 6 cycles of topotecan. In December 2014 started on maintenance Avastin.  PET scan in March 2015 showed questionable recurrence in the liver.  In August 2015 Biopsy confirmed NSCLC with neuroendocrine differentiation. Started on Alimta and Avastin.  He developed brain metastases while on that.  Treatment changed to topotecan with Avastin.  In January 2016 PET scan showed progression again and at that time he got started on on pembrolizumab.  PET scan in August 2016 showed slight worsening in the right hilar area.  So in August 2016 he started on Opdivo.  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  He was actually on treatment break recovering from pneumonitis that he got admitted at [facility] with pulmonary embolism.  His CT scan showed progression of his lung cancer.  2. Brain metastases which have been treated with CyberKnife and appeared to be responding well. 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  He was on dexamethasone which she has now tapered down.  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  3. Hypothyroidism due to Pembrolizumab. On replacement.  4. Pulmonary embolism currently on Lovenox.  5. Significant coughing.  This is most likely due to his cancer.    Plan     1. I had a long discussion with him regarding his diagnosis and situation.   His tumor is getting worse and he is getting a lot of coughing.  He needs to get started on treatment.  My plan is to start him on carboplatin and etoposide on an every three-week schedule.  Since we didn't have enough time today we'll start his treatment tomorrow.  2. Continue on Lovenox 1 mg/kg twice a day for at least 6 weeks before we switch him to Coumadin.  3. Follow-up with Dr. Dacosta for his brain lesion.  4.   Advised to start some prednisone 20 mg a day.  He was given IV steroids in the clinic along with an oxycodone and that did seem to help his symptoms for a short while    Stage      Lung Cancer    Primary site: Lung (Right) small cell as well as non-small cell lung cancer adenocarcinoma    Clinical: Stage IIIA (T2b, N2, M0) signed by Nas Hoyos MD on 9/26/2014  1:17 PM    Summary: Stage IIIA (T2b, N2, M0)      History     Jack Ricketts is a very pleasant 31 y.o. old male with a history of what was thought to be small cell lung cancer, limited stage, in 09/2012 located in the mediastinum measuring 10 cm.  Biopsy confirmed CK7 positive, synaptophysin positive chromogranin expressing cells.  Initial treatment involved chemoradiation therapy with cisplatin and -16.  Post treatment PET scan showed only a minimal response.  Rebiopsy revealed a component of nonsmall cell lung cancer as well, EGFR and ALK-1 mutation negative.  He was referred to Dr. Jose Amezcua [facility] who recommended Taxotere chemotherapy.  The patient opted for radiation therapy (CyberKnife) with radiosensitizing weekly carboplatin and Taxol chemotherapies initiated in June 2013.  He tolerated treatment quite well.  He had complete response to the treatment.       February, 2014, PET scan (compared to prior PET scan in November, 2013) revealed subcarinal node hypermetabolism compatible with active metastasis. This was also biopsied and proven to be malignant and suggestive of small cell type of cancer.  We started  him on palliative topotecan chemotherapy day 1 through 5 every 3 weeks.  Cycle one was initiated on 03/04/2014 and he finished 6 cycles of that.  His PET scan after 3 cycles showed minimal response then after 6 cycles his PET scan showed continued but incomplete response.  He did need some transfusion after the fifth cycle.    After couple months break was resumed his treatment with topotecan and Avastin.  He had 3 cycles .  His PET scan after the 2nd cycle had shown complete response.  He started on that his third cycle but could not completed because of respiratory infections.  After that in December 2014 he started on Avastin as a single agent for maintenance.  He has been on that until March 2015 when PET scan that actually showed a questionable uptake in his liver as well as the subcarinal lymph node.  He got a CT-guided biopsy of the liver lesion which was non diagnostic. At the time of the biopsy they had trouble locating the lesion.  Then in August 2015 the liver lesions got bigger and we biopsied again and came back positive for neuroendocrine carcinoma of lung origin.  He was started on treatment with Alimta and Avastin.  However he developed brain metastases in September.  Treated with CyberKnife therapy.  We change his treatment to topotecan and Avastin.  He has had couple cycles of that.  He became quite anemic and needed transfusion last week.  His brain MRI in the beginning of January 2016 showed improvement.  However his PET scan in January 2016 showed worsening in the liver lesion.    We tested his tumor for PDL-1 mutation and he did have favorable mutation for which pembrolizumab would be a targeted agent. He started on that in the end of January 2016.  He was noted to be hypothyroid after 3 cycles. Started on synthroid. He did notice depression which is better after synthroid. In August 2016 his PET scan showed progression. He was started on Opdivo. He did fine initially but then later on in  October he started to have pneumonitis-type of picture.  It was felt to be secondary to Opdivo.    He was put on some steroid Opdivo was stopped.    He was admitted at [facility] second week of December 2016 with shortness of breath.  He was found to have pulmonary embolism as well as his CT scan showed progression of his lung cancer.  He has been started on Lovenox.  He is here to start on palliative chemotherapy.  He has been having a lot of coughing.  He is been unable to sleep very well.    Past Medical History     Bronchitis, lung cancer, renal insufficiency    Family History   Problem Relation Age of Onset     Cancer Mother      Breast cancer Mother      No Medical Problems Father      No Medical Problems Brother      No Medical Problems Brother      Social History   Substance Use Topics     Smoking status: Never Smoker     Smokeless tobacco: Never Used     Alcohol use No      Comment: Very rare       Stage      Lung Cancer    Primary site: Lung (Right)    Clinical: Stage IIIA (T2b, N2, M1b) signed by Nas Hoyos MD on 8/20/2015 10:08 AM    Summary: Stage IIIA (T2b, N2, M1b)     Treatment History     1. September 2012 cisplatin and -16 along with concurrent radiation therapy for 2 cycles.  2. Weekly carboplatin and Taxol along with radiation therapy in June 2013.  3. March 2014 topotecan day 1 through 5 every 3 weeks for 6 cycles  4. September 2014 topotecan and Avastin ×3 cycles.  Third cycle was cut Short.  5. December 2014 Avastin 15 mg/kg every 3 weeks maintenance treatment.  6. August 2015 Avastin and Alimta.  7. Developed brain metastases in September 2015 treated with CyberKnife.  8. November 2015 started on topotecan with Avastin.  PET scan in January 2016 showed progression.  9. February 2016 started on pembrolizumab.  10. Opdivo started in August 15, 2016    Review of Systems   Constitutional  Fatigue: Fatigue not relieved by rest - Limiting instrumental ADL  Fever:  None  Neurosensory  Peripheral Motor Neuropathy: Asymptomatic, clinical or diagnostic observations only, intervention not indicated  Cardiovascular  Palpitations: Definition: A disorder characterized by inflammation of the muscle tissue of the heart.  Edema: Yes  Pulmonary  Cough: Severe symptoms, limiting self care ADL  Dyspnea: Shortness of breath with moderate exertion  Hypoxia: None    Gastrointestinal  Anorexia: Loss of appetite without alteration in eating habits  Dry Mouth: Symptomatic (e.g., dry or thick saliva) without significant dietary alteration, unstimulated saliva flow >0.2 ml/min  Genitourinary  Genitourinary (WDL): All genitourinary elements are within defined limits  Integumentary  Integumentary (WDL): All integumentary elements are within defined limits  Patient Coping  Patient Coping: Depression  ECOG Performance   1  Pain Status  Currently in Pain: Yes  Accompanied by  Accompanied by: Alone      Vital Signs     There were no vitals filed for this visit.    Physical Exam     Constitutional: Oriented to person, place, and time and appears well-developed.   HEENT:  Slight Cushingoid appearance. Normocephalic and atraumatic.  Eyes: Conjunctivae and EOM are normal. Pupils are equal, round, and reactive to light. No discharge.  No scleral icterus. Nose normal. Mouth/Throat: Oropharynx is clear and moist. No oropharyngeal exudate.    NECK: Normal range of motion. Neck supple. No JVD present. No tracheal deviation present.  Scar from surgery and the neck is present.   CARDIOVASCULAR: Elevated heart rate, regular rhythm and intact distal pulses.  Exam reveals no gallop and no friction rub.  Systolic murmur present.  PULMONARY: Effort normal and breath sounds normal. No respiratory distress. No Wheezing but definitely has ronchi and coarse crepitations.  ABDOMEN: Soft. Bowel sounds are normal. No distension and no mass.  There is no tenderness. There is no rebound and no guarding. No  HSM.  MUSCULOSKELETAL: Normal range of motion. No edema and no tenderness. Mild kyphosis, no tenderness.  LYMPH NODES: Has no cervical, supraclavicular, axillary and groin adenopathy.   NEUROLOGICAL: Alert and oriented to person, place, and time. No cranial nerve deficit.  Normal muscle tone. Coordination normal.   GENITOURINARY: Deferred exam.  SKIN: Skin is warm and dry. Rash on back of the neck . No erythema. No pallor.   EXTREMITIES: No cyanosis, no clubbing, no edema. No Deformity.  PSYCHIATRIC: He is somewhat anxious and depressed due to his current situation.      Lab Results     Results for orders placed or performed in visit on 01/04/17   Magnesium   Result Value Ref Range    Magnesium 1.4 (L) 1.8 - 2.6 mg/dL   Comprehensive Metabolic Panel   Result Value Ref Range    Sodium 136 136 - 145 mmol/L    Potassium 3.6 3.5 - 5.0 mmol/L    Chloride 102 98 - 107 mmol/L    CO2 25 22 - 31 mmol/L    Anion Gap, Calculation 9 5 - 18 mmol/L    Glucose 105 70 - 125 mg/dL    BUN 9 8 - 22 mg/dL    Creatinine 0.92 0.70 - 1.30 mg/dL    GFR MDRD Af Amer >60 >60 mL/min/1.73m2    GFR MDRD Non Af Amer >60 >60 mL/min/1.73m2    Bilirubin, Total 0.9 0.0 - 1.0 mg/dL    Calcium 9.1 8.5 - 10.5 mg/dL    Protein, Total 6.3 6.0 - 8.0 g/dL    Albumin 2.8 (L) 3.5 - 5.0 g/dL    Alkaline Phosphatase 51 45 - 120 U/L    AST 11 0 - 40 U/L    ALT 14 0 - 45 U/L   HM1 (CBC with Diff)   Result Value Ref Range    WBC 6.5 4.0 - 11.0 thou/uL    RBC 3.68 (L) 4.40 - 6.20 mill/uL    Hemoglobin 10.8 (L) 14.0 - 18.0 g/dL    Hematocrit 32.5 (L) 40.0 - 54.0 %    MCV 88 80 - 100 fL    MCH 29.4 27.0 - 34.0 pg    MCHC 33.3 32.0 - 36.0 g/dL    RDW 18.2 (H) 11.0 - 14.5 %    Platelets 239 140 - 440 thou/uL    MPV 6.6 (L) 7.0 - 10.0 fL    Neutrophils % 65 50 - 70 %    Lymphocytes % 23 20 - 40 %    Monocytes % 11 (H) 2 - 10 %    Eosinophils % 0 0 - 6 %    Basophils % 1 0 - 2 %    Neutrophils Absolute 4.2 2.0 - 7.7 thou/uL    Lymphocytes Absolute 1.5 0.8 - 4.4 thou/uL     Monocytes Absolute 0.7 0.0 - 0.9 thou/uL    Eosinophils Absolute 0.0 0.0 - 0.4 thou/uL    Basophils Absolute 0.0 0.0 - 0.2 thou/uL         Distress Assessment       Distress Assessment Score: 7     Imaging Results       Xr Chest Pa And Lateral    Result Date: 1/4/2017  XR CHEST PA AND LATERAL 1/4/2017 11:41 AM INDICATION: Cough COMPARISON: 12/21/2016, 12/15/2016 FINDINGS: Left-sided Port-A-Cath with catheter tip in the high right atrium. Right infrahilar mass with fiducial markers appears grossly unchanged. No pleural effusion or pneumothorax. There is increased right perihilar engorgement and right lung interstitial opacities compared to the prior study, may represent edema secondary to mass effect on the right perihilar vessels from the known mass.    Xr Chest Pa And Lateral    Result Date: 12/15/2016  XR CHEST PA AND TTKFETI39/15/2016 2:25 PMINDICATION: SOB. Pancreatic cancer.COMPARISON: 10/19/2016FINDINGS: Minimal change. Tumor and/or consolidation involving right middle and lower lobe, and right hilum unchanged. Minimal interstitial infiltrate left infrahilar region similar to prior study. Upper lung zones remain clear. Heart size normal with Port-A-Cath present.This report was electronically interpreted by: Dr. Danish Hernandez MD ON 12/15/2016 at 14:36    Cta Chest Pe Run    Result Date: 12/15/2016  CTA CHEST PE RUN 12/15/2016 8:17 PM INDICATION: Shortness of breath TECHNIQUE: Helical acquisition through the chest was performed during the arterial phase of contrast enhancement using IV contrast. 2D and 3D reconstructions were performed by the CT technologist. Dose reduction techniques were used. IV CONTRAST: Iohexol (Omni) 100 mL COMPARISON: Noncontrast CT 11/4/2016 FINDINGS: ANGIOGRAM CHEST: Exam is positive for pulmonary emboli with an embolism present within the posterior and medial segmental arteries of left lower lobe. The overall burden of emboli is fairly small. LUNGS AND PLEURA: Significant increase  in the apparent central right hilar tumor with confluent soft tissue extending out from the right hilum surrounding and attenuating bronchi and pulmonary arteries. There is new complete atelectasis of right middle lobe. There are new small nodules present within central aspect of right upper lobe. There is been improved aeration of left lung with its near complete resolution of the patchy peripheral neck airspace nodules previously identified, likely this was infectious or inflammatory in nature. MEDIASTINUM: Fiducial markers are present near the subcarinal region. Confluent soft tissue mass involving the entire right hilum extends into the subcarinal region and is difficult to define tumor borders but has enlarged. For example, at the level of the bronchus intermedius and just above the fiducial markers, the tumor now measures 4.6 x 2.3 cm, previously 3.5 x 1.5 cm. There is also left hilar lymphadenopathy. Increase in pericardial fluid now with posterior thickness of 1.6 cm as compared to just  less than 1 cm. LIMITED UPPER ABDOMEN: Negative. MUSCULOSKELETAL: Negative.     CONCLUSION: 1.  Exam is positive for pulmonary embolism with relatively small burden of emboli at left lower lobe. 2.  Significant increase in what appears to be confluent tumor mass from mediastinum through right hilum and into the central aspects of right lung. 3.  There is new complete atelectasis of right middle lobe. 4.  Small but enlarging pericardial effusion. 5.  Findings discussed with the patient's nurse, Maureen, at 2035 hours    Ct Abdomen Pelvis Without Oral With Iv Contrast    Result Date: 12/15/2016  CT ABDOMEN PELVIS WO ORAL W IV CONTRAST 12/15/2016 8:18 PM     INDICATION: Weight loss, unintended, non-localized abd pain TECHNIQUE: CT abdomen and pelvis. Multiplanar reformation images (MPR). Dose reduction techniques were used. IV CONTRAST: Iohexol (Omni) 100 mL COMPARISON: Chest CTA from today, abdominal CT 5/4/2015. PET/CT from  10/21/2016. FINDINGS: LUNG BASES: Confluent right infrahilar mass now abuts the right hemidiaphragm. There is a new 1.9 cm low-density lymph node inferior mediastinum between aorta, azygos and esophagus. ABDOMEN: Liver, gallbladder, bile ducts, pancreas, spleen, adrenals and kidneys appear negative. PELVIS: Bowel unremarkable, nothing inflammatory. Bladder negative. No lymphadenopathy. MUSCULOSKELETAL: Bilateral hip AVN, unchanged.     CONCLUSION: 1.  Worsening disease in the lower thorax. 2.  Negative CT of abdomen and pelvis.    Nm Pet Ct Skull To Mid Thigh    Result Date: 12/21/2016  PET FDG/CT 12/21/2016 9:20 AM INDICATION: Lung cancer, restaging. Prior radiation therapy to mediastinum. Subsequent treatment strategy. TECHNIQUE: Serum glucose level 78 mg/dL. One hour post left antecubital intravenous administration of 8.1 mCi F-18 FDG, PET imaging was performed from the skull base to the mid thighs utilizing attenuation correction with concurrent axial CT and PET/CT image fusion. Dose reduction techniques were used. COMPARISON: PET/CT 10/21/2016. CT chest abdomen pelvis 12/15/2016 reviewed. FINDINGS: HEAD AND NECK: Normal physiologic FDG uptake. CHEST: The extensive bilateral patchy and confluent ground glass opacities depicted 10/21/2016 have nearly resolved. Some residual linear and small nodular opacities persist suggesting some residual scarring. Irregular soft tissue thickening in the right  perihilar region at site of original primary malignancy has overall increased in size and degree of patchy heterogeneous uptake since 10/21/2016, with some areas demonstrating increased marked uptake (SUVmax 11.8, previously 7.0). Additionally, hypermetabolic bilateral hilar, left subcarinal and left superior mediastinal prevascular adenopathy has increased in size and degree of uptake. A nodule in the extreme right anterior costophrenic sulcus has also enlarged with increased moderate focal uptake (SUVmax 5.8,  previously 3.3). Representative left hilar uptake is marked (SUVmax 10.2, previously 6.9). Increased size moderate pericardial effusion. Fiducial markers right perihilar and subcarinal regions. Left IJ Port-A-Cath with tip in the upper RA. ABDOMEN/PELVIS: Normal physiologic FDG uptake. No focal uptake in the liver. Hyperdense material within the gallbladder, possibly sludge. MUSCULOSKELETAL: No suspicious focal skeletal uptake. Scattered physiologic or inflammatory symmetric muscular uptake, including bilateral oblique muscles, left lateral chest and both forearms.     CONCLUSION: 1. Interval progression of right perihilar malignancy with increased irregular soft tissue thickening and patchy heterogeneous uptake. Thoracic adenopathy has also progressed. 2. Near complete resolution of extensive bilateral pulmonary opacities depicted 10/21/2016.       Signed by: Nas Hoyos MD

## 2021-06-08 NOTE — PROGRESS NOTES
[facility] Hematology and Oncology Progress Note    Patient: Jack Ricketts  MRN: [id number]  Date of Service: 1/13/2017         Reason for Visit:    D9C1 carboplatin + Etoposide chemotherapy.    Assessment and Plan:    1)  Metastatic mixed small cell and non-small cell lung cancer:     31 y.o.     2012, September - diagnosed with what was thought to be small cell lung cancer, limited stage, located in the mediastinum measuring 10 cm.    Biopsy confirmed CK7 positive, synaptophysin positive chromogranin expressing cells.     09/2012 - initial treatment with chemoradiation therapy (cisplatin and -16). Posttreatment PET scan showed only a minimal response. Rebiopsy revealed a component of nonsmall cell lung cancer as well, EGFR and ALK-1 mutation negative.     06/2013 - referred to Dr. Jose Amezcua, [facility], who recommended Taxotere chemotherapy. The patient opted for radiation therapy (CyberKnife) with radiosensitizing weekly carboplatin and Taxol chemotherapies.      02/2014, PET scan (compared to prior PET scan in November, 2013) revealed subcarinal node hypermetabolism compatible with active metastasis.  March - July completed 6 cycles palliative topotecan chemotherapy.  7/16/2014 PET/CT (compared to 04/28/2014) revealed persistent but decreased subcarinal hypermetabolism with no new hypermetabolic lesions identified. Due to significant fatigue secondary to chemotherapy, he was given a break from treatment. On 09/08/2014 he resumed cycle #7 topotecan with Avastin added. In November, 2014, he was only able to get day 1 of the topotecan and Avastin due to fatigue.     12/2014 - 02/2015 maintenance Avastin.    03/11/15 PET showed recurrent mild focal FDG activity in the subcarinal region of the mediastinum and new 2.3 cm FDG avid lesion in the liver, suspicious for metastatic lung cancer    03/19/15 liver biopsy negative    08/5/15 PET showed progressive disease in a subcarinal lymph node, enlarging  and increasingly FDG avid hepatic metastasis, and renewed tumor metabolism at the primary tumor site in the right hilum.    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     08/19/15 MRI brain - multiple enhancing lesions in the bilateral cerebral hemispheres          9/04/15 completed 2,200 cGy CyberKnife radiosurgery.     8/13-9/21/15 completed 2 cycles Alimta with Avastin    10/12/15 LFT elevation prompted restaging PET showing disease progression.    10/19 - 12/28/15 - 3+ cycles Topotecan with Avastin chemotherapy.    1/13/16 restaging PET - progressive adenopathy and right liver disease.    PD-L1 positive @ 60%.      02/03-07/18/16 - 9 cycles Keytruda     07/11/16 PET - Area of active tumor metabolism in the right hilum has enlarged and increased in FDG activity. A single right hilar lymph node has become moderately FDG avid. No evidence of active tumor metabolism outside of the right hemithorax.    07/26/16 EBUS pathology showed recurrent neuroendocrine carcinoma.      08/15/16 - 3 day history of left sided weakness, occasional speech slurring and headaches yet managed with Tylenol.      08/16/16 brain MRI - showed radiation necrosis.  Began dex 4 mg t.i.d. with food.      08/22 - 10/24/16 - 3 cycles Opdivo.    10/25 - 11/08/16 - hospitalized @ 's ICU with acute respiratory failure - suspected Opdivo related.  Treated emperically for PCP and placed on steroids.  Bronch showed no evidence of malignancy, only mixed inflammatory cells.  No pneumocystis carinii.      11/15/16 MR head - interval decrease in the irregular enhancement as well as the T2 prolongation in the right corona radiata and right periventricular region.  No new enhancing intracranial lesions.    11/21/16 follow up Dr. [facility] - head MRI improved.  Off dexamethasone.  Follow up in 3 months with MRI head.    12/15 - 12/20/16 hospitalized with shortness of breath.   Found to have a PE as well as CT showing progression of his lung cancer.      12/05/16 - D1C1 carboplatin + Etoposide palliative chemotherapy.    01/13/17 CBC - WBC 0.6.  ANC 0.1.  Plts 81,000. HgB 10.5.    01/13/17 CMP - mild hypoalbuminemia improved.  Encouraged more dietary protein.    01/13/17 - looks and feels much better.  Continues prednisone 20 mg daily.  Will begin taper next Wednesday after 2 weeks therapy, decreasing dose to 15 mg daily.  Continue Bactrim every other day.  Reviewed neutropenic precautions.  If fever > 100.4 he is to present to ED.  Will try daily Neupogen with his next cycle.      01/30/17 - follow up D1C2 carboplatin + Etoposide with Neulasta (will try daily Neupogen) palliative chemotherapy.  Sooner if concerns arise.    Returned to work part time.  Instructed not to work until neutropenia resolved.    2) Cough and wheezing:    Supplemental 02, currently @ 1 liter NC p.r.n.    01/04/17 - started prednisone 20 mg a day.  Will begin taper next Wednesday after 2 weeks therapy, decreasing dose to 15 mg daily if tolerated.    Wheeze pretty much gone.  Cough greatly improved.    Using inhalers p.r.n.    3) Hypothyroid:    Due to Pembrolizumab    On replacement.    01/13/17 TSH - WNL.      Recheck levels monthly.    4) PE:    On Lovenox, 1 mg/kg twice a day.    Continue for at least 6 weeks before we switch him to Coumadin, end of January.    5)   Emotional:     Previously met with Sara Fischer, psychotherapy.     He doesn't feel the need to regroup at this time.     He continues to have a good support system with one of his two brothers and his father.    6) Nutrition:    Weight quite stable.    Albumin improved.      Fair appetite.      Has met with Nutrition Services previously.  Due to his work he feels he has a good grasp on the deficits in his nutrition and does not want to see Nutrition Services again at this time.    He stopped taking Boost daily.      7) Hypomagnesemia:    1.4  "today.    Begin Slomag - prescription escribed.    Will give 3 grams IV today    ECOG Performance:  0-1    Distress Assessment:  Coping as well as can be expected.           > 25 minutes with > 50% counseling and care coordination    Derrick Corado, CNP    CC: Kvng Ramirez MD    ______________________________________________________________________________    Interim History:    The patient presents feeling a bit better.  He rarely uses the supplemental 02, currently @ 1 liter NC p.r.n.  He was restarted on 20 mg prednisone daily for wheezing and cough which has worked very well.  His cough is becoming more productive with clear to yellow phlegm.  His fatigue remains unchanged, he is more active.  He states his appetite has been good since starting the prednisone.  He denies much nausea and had no vomiting with his current therapy.  He continues on albuterol.  He denies pain, fever, chills, headaches, visual or mentation disturbance, bowel or bladder issues.  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  If he does not work he will loose his benefits.  His nurse Navigators are assisting him with this.  He may need to go on Social Security.     Review of Systems:    10 point review of systems negative other than what is mentioned in HPI.    Past Histories:    Past Medical History   Diagnosis Date     Brain metastases      Cough      Hypomagnesemia 12/16/2016     Lung cancer          Past Surgical History   Procedure Laterality Date     Thyroid surgery       Lung biopsy       Portacath placement       venous port     Picc  10/26/2016             Physical Exam:    Recent Vitals 1/7/2017   Height 6' 1\"   Weight 261 lbs   BSA (m2) 2.47 m2   /85   Pulse 112   Temp 98.2   Temp src 1   SpO2 95     GENERAL:   Pleasant male.  Alert and oriented x 3.  No acute distress.  Cushingoid symptoms improved.  HEENT:   Anicteric sclerae. EOMI. PERRLA. Oropharynx unremarkable.    LYMPH NODES:  No appreciable " adenopathy neck, axilla, groin.   HEART:   S1S2, no murmur heard.   LUNGS:   Clear with diminished breath sounds.  No rhonchi or wheezes.   ABDOMEN:   No appreciable organomegaly, masses or ascites.   EXTREMITIES:  Mild ankle edema resolved.    SKIN:   Folliculitis resolved face, neck and chest.    Lab Results:    Reviewed with patient.    Recent Results (from the past 24 hour(s))   Magnesium   Result Value Ref Range    Magnesium 1.4 (L) 1.8 - 2.6 mg/dL   Comprehensive Metabolic Panel   Result Value Ref Range    Sodium 138 136 - 145 mmol/L    Potassium 3.5 3.5 - 5.0 mmol/L    Chloride 100 98 - 107 mmol/L    CO2 28 22 - 31 mmol/L    Anion Gap, Calculation 10 5 - 18 mmol/L    Glucose 122 70 - 125 mg/dL    BUN 21 8 - 22 mg/dL    Creatinine 1.12 0.70 - 1.30 mg/dL    GFR MDRD Af Amer >60 >60 mL/min/1.73m2    GFR MDRD Non Af Amer >60 >60 mL/min/1.73m2    Bilirubin, Total 0.6 0.0 - 1.0 mg/dL    Calcium 8.9 8.5 - 10.5 mg/dL    Protein, Total 6.3 6.0 - 8.0 g/dL    Albumin 3.4 (L) 3.5 - 5.0 g/dL    Alkaline Phosphatase 50 45 - 120 U/L    AST 11 0 - 40 U/L    ALT 38 0 - 45 U/L   HM1 (CBC with Diff)   Result Value Ref Range    WBC 0.6 (LL) 4.0 - 11.0 thou/uL    RBC 3.64 (L) 4.40 - 6.20 mill/uL    Hemoglobin 10.5 (L) 14.0 - 18.0 g/dL    Hematocrit 32.2 (L) 40.0 - 54.0 %    MCV 88 80 - 100 fL    MCH 28.9 27.0 - 34.0 pg    MCHC 32.7 32.0 - 36.0 g/dL    RDW 17.6 (H) 11.0 - 14.5 %    Platelets 81 (L) 140 - 440 thou/uL    MPV 7.5 7.0 - 10.0 fL   Manual Differential   Result Value Ref Range    Total Neutrophils % 10 (L) 50 - 70 %    Lymphocytes % 88 (H) 20 - 40 %    Monocytes % 2 2 - 10 %    Eosinophils %  0 0 - 6 %    Basophils % 0 0 - 2 %    Total Neutrophils Absolute 0.1 (L) 2.0 - 7.7 thou/ul    Lymphocytes Absolute 0.5 (L) 0.8 - 4.4 thou/uL    Monocytes Absolute 0.0 0.0 - 0.9 thou/uL    Eosinophils Absolute 0.0 0.0 - 0.4 thou/uL    Basophils Absolute 0.0 0.0 - 0.2 thou/uL    Reactive Lymphocytes 1+ (!) Negative    Platelet  Estimate Decreased (!) Normal    Ovalocytes 1+ (!) Negative   TSH   Result Value Ref Range    TSH 1.89 0.30 - 5.00 uIU/mL       Imaging:    No new imaging.

## 2021-06-08 NOTE — PROGRESS NOTES
Pt amb into clinic for mag sulf infusion. Reviewed med with pt. Pt tolerated infusion very well. Also reviewed neutropenic precautions, especially hand washing and when to call. Pt states understanding. Pt amb out of clinic.

## 2021-06-08 NOTE — PROGRESS NOTES
I met with Buddy today while he was waiting for his chemo in infusion.  I told him I just wanted to check in and see how he was doing.  He said he is tired, but doing ok.  He said he has been working and receiving his benefits, but worries that will run out soon, feels that his HR dept has been very lenient about him only working 10-12 hours a week and still receiving benefits.  I asked him about applying for SSDI.  He said he has all of the information I gave him before.  I urged him to look into it and apply sooner than later as it sometimes takes a while for approval.  He said he does have a computer at home.  I urged him to look at the website.  I told him if he is overwhelmed with the application, I would help him with it or will get him some help with the Financial SW.  He agrees.    I asked if he was needing any financial support from [employer], as it is now a new year.  He declines.  Said he still has a check from them as his dad was helping him with the rent the last couple of months.      Support and encouragement provided, invited calls.

## 2021-06-08 NOTE — PROGRESS NOTES
Pt here for INR lab draw.PORT accessed with excellent blood return. PORT flushed with Normal Saline and 6 cc Heparin and deaccessed.

## 2021-06-09 NOTE — PROGRESS NOTES
ASSESSMENT:  1. Pulmonary embolism  Diagnosed December 2015, was on warfarin, currently not on Coumadin.  No evidence of a clot from CT scan March 2017    2. Hypoxia  Patient was diagnosed and hospitalized with hypoxia, multiple etiologies including his underlying lung cancer, pulmonary hemorrhage presumed pneumonia, now saturations 95% doing better.      PLAN:  1.  In terms of the pulmonary embolus, since there is no evidence of any existing DVT or lung pulmonary embolus he does not need to be on Coumadin, especially with his recent long hemorrhage.  2.  Advised that the patient can still use the oxygen intermittently though he doesn't need to all the time.  3.  Patient has regular follow-up with oncology.       No orders of the defined types were placed in this encounter.    There are no discontinued medications.    No Follow-up on file.    CHIEF COMPLAINT:  Chief Complaint   Patient presents with     Hospital Visit Follow Up     hypoxia at [facility] from 3/6-3/18/17- still slight fatigue        SUBJECTIVE:  Jack is a 31 y.o. male presenting to the clinic for follow up from his recent hospitalization. He presented to the ED at [facility] on March 5 with shortness of breath, chills, cough, vomiting and fever. He received an influenza swab, which returned negative. He received a chest CT PE run, which was negative for acute arterial pulmonary embolus but was positive for pulmonary hypertension. He was started on antibiotics and admitted at that time for further workup. He decompensated and developed hemoptysis that evening, prompting BiPAP, which he failed. He underwent bronchoscopy at that time and was later intubated. He was later transferred to [facility] on March 6 for further treatment. He received extensive consultations with hematology/oncology, neurology, gastroenterology, and hospitalist service during his admission. He received a venous ultrasound of his right leg March 18, which returned negative for DVT.  He was discharged in stable condition on March 18 with instruction to follow up with hematology/oncology and his primary care physician.     His breathing has improved since discharge. He lives in the second floor of an apartment building and requires climbing the stairs 1+ times per day. He does not use oxygen at night. He uses oxygen infrequently. He clarifies that he reserves oxygen use for exertion, notably while ascending the stairs into his apartment. He was provided a prescription of oxycodone for left arm pain and bruising secondary to constant blood pressure monitoring during his admission. He has received emotional and financial support from his girlfriend, brother, and father. He is currently managing his prognosis well emotionally. He denies shortness of breath while walking into the clinic today. He follows with Dr. Hoyos in hematology and oncology regularly.     Medication management: He is currently taking low-doseaspirin as needed and 250 mg Tylenol as needed.     REVIEW OF SYSTEMS:   He uses an albuterol inhaler for treatment of occasional coughing episodes, which he believes are secondary to his apartment being an older unit which contains significant amounts of dust.  He runs an air purifier in his apartment at all times. His oxygen saturation is currently 95%.  All other systems are negative.    PFSH:  He has a history of left lower lobe pulmonary embolus.   Immunization History   Administered Date(s) Administered     Influenza, inj, historic 10/01/2014     Influenza,seasonal quad, PF, 36+MOS 12/17/2016     Pneumo Polysac 23-V 11/11/2016     Tdap 04/16/2011     Social History     Social History     Marital status: Single     Spouse name: N/A     Number of children: N/A     Years of education: N/A     Occupational History     Kitchen      [employer]     Social History Main Topics     Smoking status: Never Smoker     Smokeless tobacco: Never Used     Alcohol use No      Comment: Very rare     Drug  use: No     Sexual activity: No     Other Topics Concern     Not on file     Social History Narrative    Lives with girlfriend        Exercise- Walking at work     History reviewed. No pertinent past medical history.  Family History   Problem Relation Age of Onset     Cancer Mother      Breast cancer Mother      No Medical Problems Father      No Medical Problems Brother      No Medical Problems Brother        MEDICATIONS:  Current Outpatient Prescriptions   Medication Sig Dispense Refill     acetaminophen (TYLENOL) 500 MG tablet Take 500-1,000 mg by mouth every 6 (six) hours as needed for pain.       albuterol (PROVENTIL HFA;VENTOLIN HFA) 90 mcg/actuation inhaler Inhale 2 puffs 4 (four) times a day. And every 4 hours as needed for wheeze or cough 18 g 1     calcium, as carbonate, (TUMS) 200 mg calcium (500 mg) chewable tablet Chew 2 tablets as needed for heartburn.       inhalational spacing device Spcr Use as directed with inhaler 1 each 0     levETIRAcetam (KEPPRA) 500 MG tablet Take 1 tablet (500 mg total) by mouth 2 (two) times a day. 60 tablet 1     levothyroxine (SYNTHROID, LEVOTHROID) 175 MCG tablet Take 1 tablet (175 mcg total) by mouth daily. 90 tablet 12     magnesium chloride (SLOW-MAG) 71.5 mg TbEC Take 1 tablet by mouth 2 (two) times a day. 60 tablet 2     metoprolol succinate (TOPROL XL) 25 MG Take 0.5 tablets (12.5 mg total) by mouth daily. 30 tablet 1     omeprazole (PRILOSEC) 20 MG capsule Take 1 capsule (20 mg total) by mouth Daily before breakfast. 90 capsule 2     ondansetron (ZOFRAN-ODT) 4 MG disintegrating tablet Take 4 mg by mouth every 8 (eight) hours as needed for nausea.       oxyCODONE (OXY-IR) 5 mg capsule 1-2 tabs every 8 hours as needed 60 capsule 0     oxyCODONE (ROXICODONE) 5 MG immediate release tablet        predniSONE (DELTASONE) 5 MG tablet Take 3 tabs oral daily for 3 doses , then 2 tabs oral daily for 3 doses, then 1 tab oral daily for 3 doses, then stop 30 tablet 0      senna-docusate (PERICOLACE) 8.6-50 mg tablet Take 1 tablet by mouth 2 (two) times a day.  0     sulfamethoxazole-trimethoprim (SEPTRA DS) 800-160 mg per tablet Take 1 tablet by mouth 3 (three) times a week. 30 tablet 3     No current facility-administered medications for this visit.      Facility-Administered Medications Ordered in Other Visits   Medication Dose Route Frequency Provider Last Rate Last Dose     heparin 100 unit/mL lockflush (PF) porcine 300-600 Units  300-600 Units Intravenous PRN Derrick Corado CNP   600 Units at 12/06/16 1502     heparin 100 unit/mL lockflush (PF) porcine 600 Units  600 Units Intravenous PRN Nas Hoyso MD   600 Units at 01/15/16 1445     sodium chloride 0.9 % flush 10 mL (NS)  10 mL Intravenous PRN Nas Hoyos MD   20 mL at 01/15/16 1445     sodium chloride 0.9 % flush 10 mL (NS)  10 mL Intravenous PRN Derrick Corado CNP   10 mL at 12/06/16 1505       TOBACCO USE:  History   Smoking Status     Never Smoker   Smokeless Tobacco     Never Used       VITALS:  Vitals:    03/24/17 1158 03/24/17 1215   BP: 110/80    Pulse: 70 (!) 112   SpO2:  95%   Weight: (!) 250 lb (113.4 kg)      Wt Readings from Last 3 Encounters:   03/24/17 (!) 250 lb (113.4 kg)   03/18/17 (!) 246 lb (111.6 kg)   03/06/17 (!) 261 lb 9.6 oz (118.7 kg)       PHYSICAL EXAM:  Constitutional:   Reveals a pleasant, middle-aged man.  Vitals: per nursing notes.  Neuro:  Alert and oriented. Cranial nerves, motor, sensory exams are intact.  No gross focal deficits.  Psychiatric:  Memory intact, mood appropriate.      DATA REVIEWED:  Additional History from Old Records Summarized (2): Reviewed hem/onc note from March 18 regarding stage IV lung cancer.   Decision to Obtain Records (1): none  Radiology Tests Summarized or Ordered (1): Reviewed chest CT angiogram report from March 5. Reviewed imaging reports from March 5-18.   Labs Reviewed or Ordered (1): Reviewed labs from admission March 6-18.  Medicine Test  Summarized or Ordered (1): Reviewed echocardiogram report from March 7.   Independent Review of EKG, X-RAY, or RAPID STREP (2 each): none      The visit lasted a total of 14 minutes face to face with the patient. Over 50% of the time was spent counseling and educating the patient about acute respiratory failure.    I, Guero Light, am scribing for and in the presence of, Dr. Miguel Ramirez.    I, Dr. Miguel Ramirez, personally performed the services described in this documentation, as scribed by Guero Light in my presence, and it is both accurate and complete.      Total data points: 5

## 2021-06-09 NOTE — PROGRESS NOTES
[facility] Radiation Oncology Follow Up Note    Patient: Jack Ricketts  MRN: [id number]  Date of Service: 02/28/2017    Assessment / Impression     1. Non-small cell cancer of right lung  MR Head With Without Contrast   2. Brain metastasis  MR Head With Without Contrast     Non-small cell cancer of right lung    Staging form: Lung, AJCC 7th Edition      Clinical: Stage IV (T2b, N2, M1b) - Signed by Nas Hoyos MD on 8/20/2015      Pathologic: No stage assigned - Unsigned      Plan:     Brain metastasis:  As far as his brain goes everything is essentially stable with some minor changes.  Follow-up in 3 months with MRI.    Systemic disease:  I'm sending him upstairs today because he looks very pale in these extremely weak and Week When He Walks Any Distance.  He Does Have an Appointment Tomorrow for Labs.  He May Get Those Labs Drawn Today.    Face to face time  15 minutes with > 50% spent on consultation, education and coordination of care     Subjective:      HPI: Jack Ricketts is a 31 y.o. male who was diagnosed with a small cell lung cancer and was treated with standard chemotherapy and radiation receiving 4500 centigray in 15 fractions of 150 cGy given on a twice a day scheduled. His chemotherapy consisting of cisplatin. After a poor response to treatment he had a repeat biopsy which was positive for large cell carcinoma with neuroendocrine features. He underwent his 2nd course of concurrent chemoradiation therapy receiving a stereotactic approach to 50 Gy in 25 fractions using the CyberKnife radiosurgical technique with real-time fiducial guided tracking. This treatment was delivered from June 25, 2013 through August 1, 2013. He subsequently had measurable disease on PET scan. He's been undergoing chemotherapy since that time. A PET/CT on 4/28/2014 showed some response to treatment but an incomplete response as some hypermetabolic activity was still present. Since his documented recurrence in February  2014 he has received 6 cycles of topotecan. After 2 months break he was started on topotecan and Avastin. He has finished 2 treatments of those. PET scan shows complete remission. He was only able to get day 1 of the topotecan and Avastin for his third cycle. In December 2014 started on maintenance Avastin. PET scan in March showed questionable recurrence in the liver. Current PET scan shows progression. Biopsy confirmed NSCLC with neuroendocrine differentiation. Started on Alimta and Avastin.      Most recently on August 19, 2015 he had an MRI of the brain which showed multiple enhancing lesions in both cerebral hemispheres consistent with metastatic lung cancer.      He was treated with Cyberknife radiosurgery. The following is a summary of treatment information.      Course: C1    Treatment Site     Energy     Dose/Fx (cGy)     #Fx     Dose Correction (cGy)     Total Dose (cGy)     Start Date     End Date     Elapsed Days       2 Lesions     6X     2,200     1 / 1     0     2,200     9/4/2015 9/4/2015     0       Rt Caudate     6X     2,200     1 / 1     0     2,200     9/8/2015 9/8/2015     0       Right Frontal     6X     2,200     1 / 1     0     2,200     9/9/2015 9/9/2015     0       Left Frontal     6X     2,200     1 / 1     0     2,200     9/8/2015 9/8/2015                  Chief Complaint   Patient presents with     HE Cancer     Follow up with Dr. Dacosta   .    The following portions of the patient's history were reviewed and updated as appropriate: allergies, current medications, past family history, past medical history, past social history, past surgical history and problem list.    Current Outpatient Prescriptions   Medication Sig Dispense Refill     albuterol (PROVENTIL HFA;VENTOLIN HFA) 90 mcg/actuation inhaler Inhale 2 puffs 4 (four) times a day. And every 4 hours as needed for wheeze or cough 18 g 1     calcium, as carbonate, (TUMS) 200 mg calcium (500 mg) chewable tablet Chew 2  tablets as needed for heartburn.       ibuprofen (ADVIL,MOTRIN) 200 MG tablet Take 400 mg by mouth every 6 (six) hours as needed for pain.       inhalational spacing device Spcr Use as directed with inhaler 1 each 0     levothyroxine (SYNTHROID, LEVOTHROID) 175 MCG tablet Take 1 tablet (175 mcg total) by mouth daily. 30 tablet 1     magnesium chloride (SLOW-MAG) 71.5 mg TbEC Take 1 tablet by mouth 2 (two) times a day. 60 tablet 2     omeprazole (PRILOSEC) 20 MG capsule Take 20 mg by mouth Daily before breakfast.       oxyCODONE (OXY-IR) 5 mg capsule 1-2 tabs every 8 hours as needed 30 capsule 0     sulfamethoxazole-trimethoprim (SEPTRA DS) 800-160 mg per tablet One tab three times weekly 30 tablet 2     warfarin (COUMADIN) 5 MG tablet Take 1 tablet (5 mg total) by mouth daily. 5 mg by mouth daily.  Adjust dose based on INR results as directed. 30 tablet 11     No current facility-administered medications for this visit.      Facility-Administered Medications Ordered in Other Visits   Medication Dose Route Frequency Provider Last Rate Last Dose     heparin 100 unit/mL lockflush (PF) porcine 300-600 Units  300-600 Units Intravenous PRN Derrick Corado CNP   600 Units at 12/06/16 1502     heparin 100 unit/mL lockflush (PF) porcine 600 Units  600 Units Intravenous PRN Nas Hoyos MD   600 Units at 01/15/16 1445     sodium chloride 0.9 % flush 10 mL (NS)  10 mL Intravenous PRN Nas Hoyos MD   20 mL at 01/15/16 1445     sodium chloride 0.9 % flush 10 mL (NS)  10 mL Intravenous PRN Derrick Corado CNP   10 mL at 12/06/16 1505     sodium chloride 0.9% 250 mL  250 mL Intravenous PRN Derrick Corado CNP         sodium chloride 0.9% 250 mL  250 mL Intravenous PRN Derrick Corado CNP           Review of Systems    Constitutional  Constitutional (WDL): Exceptions to WDL  Fatigue: Fatigue not relieved by rest, limiting self care ADL  Neurosensory  Neurosensory (WDL): Exceptions to WDL  Ataxia: Moderate  symptoms, limiting instrumental ADL (past couple days)  Cardiovascular  Cardiovascular (WDL): All cardiovascular elements are within defined limits  Pulmonary  Respiratory (WDL): Exceptions to WDL  Cough: Mild symptoms, nonprescription intervention indicated  Dyspnea: Shortness of breath with moderate exertion  Gastrointestinal  Gastrointestinal (WDL): Exceptions to WDL  Anorexia: Loss of appetite without alteration in eating habits  Genitourinary  Genitourinary (WDL): All genitourinary elements are within defined limits  Integumentary  Integumentary (WDL): All integumentary elements are within defined limits  Alopecia: Hair loss of >50% normal for that individual that is readily apparent to others, a wig or hair piece is necessary if the patient desires to completely camouflage the hair loss, associated with psychosocial impact  Patient Coping  Patient Coping: Accepting  Distress Assessment  Distress Assessment Score: 5 (due to symptoms)  Accompanied by  Accompanied by: Alone        REVIEW OF SYSTEMS     General: Site: Brain mets  Stereotactic Radiosurgery: Yes  Stereotactic Radiosurgery date: 09/09/15  Today's Dose: Follow up  Comfort: KPS: 70% Cannot do active work, but can care for self  Fatigue (ONS scale) : 8: Extreme Fatigue  Pain Location: Denies,   CNS Alteration: Depressed level of consciousness: 0: Normal  Oriented x of spheres: 3  Neuropathy - motor: 0: Normal  Ataxia: 3: Moderate symptoms interfering with ADL (past couple days not good)  Speech Impairment (e.g. Dysphasia or aphasia): 0: Normal  Seizures: 0: None  Urinary Incontinence: 0: None  Bowel Incontinence: 0: None  Insomnia: 0: Normal  Sensory Alteration: Ocular/Visual - other : 0: Normal  Middle ear/hearin: Normal  Tinnitus: 0: No  Nutrition: Anorexia: 0: None  Nausea: 0: None  Vomitin: None  Dyspepsia and/or Heartburn: 0: None  Weight: 256.2 POUNDS  BMI: 33.81  Skin: Skin Sensation: 0: No problem  Skin Reaction: 0: None  Alopecia: 2:  "Pronounced hair loss  Mucous Membranes: Thrush: 0: Absent  Emotional: Copin: Effective  Vital Signs: SpO2: 96 %    Objective:     Physical Exam     Visit Vitals     /70  Comment: sitting     Pulse (!) 126  Comment: sitting     Temp 98.3  F (36.8  C) (Oral)     Ht 6' 1\" (1.854 m)     Wt (!) 256 lb 3.2 oz (116.2 kg)     BMI 33.8 kg/m2     General appearance: alert, appears stated age, cooperative and pale  Head: Normocephalic, without obvious abnormality, atraumatic  Eyes: negative findings: conjunctivae and sclerae normal and corneas clear, positive findings: conjunctiva: clear  Neck: no adenopathy, supple, symmetrical, trachea midline and thyroid not enlarged, symmetric, no tenderness/mass/nodules  Extremities: edema Right arm and Generally pale but with good capillary refill at the tips of his fingers  Skin: normal and nails normal without clubbing     Recent Labs:   Recent Results (from the past 168 hour(s))   INR   Result Value Ref Range    INR 4.63 (H) 0.90 - 1.10   Magnesium   Result Value Ref Range    Magnesium 1.5 (L) 1.8 - 2.6 mg/dL   Comprehensive Metabolic Panel   Result Value Ref Range    Sodium 139 136 - 145 mmol/L    Potassium 3.9 3.5 - 5.0 mmol/L    Chloride 102 98 - 107 mmol/L    CO2 27 22 - 31 mmol/L    Anion Gap, Calculation 10 5 - 18 mmol/L    Glucose 107 70 - 125 mg/dL    BUN 19 8 - 22 mg/dL    Creatinine 0.93 0.70 - 1.30 mg/dL    GFR MDRD Af Amer >60 >60 mL/min/1.73m2    GFR MDRD Non Af Amer >60 >60 mL/min/1.73m2    Bilirubin, Total 1.5 (H) 0.0 - 1.0 mg/dL    Calcium 9.5 8.5 - 10.5 mg/dL    Protein, Total 7.3 6.0 - 8.0 g/dL    Albumin 3.8 3.5 - 5.0 g/dL    Alkaline Phosphatase 67 45 - 120 U/L    AST 17 0 - 40 U/L    ALT 28 0 - 45 U/L   HM1 (CBC with Diff)   Result Value Ref Range    WBC 1.0 (LL) 4.0 - 11.0 thou/uL    RBC 2.42 (L) 4.40 - 6.20 mill/uL    Hemoglobin 6.8 (LL) 14.0 - 18.0 g/dL    Hematocrit 20.2 (L) 40.0 - 54.0 %    MCV 84 80 - 100 fL    MCH 28.0 27.0 - 34.0 pg    MCHC 33.5 " 32.0 - 36.0 g/dL    RDW 23.1 (H) 11.0 - 14.5 %    Platelets 93 (L) 140 - 440 thou/uL    MPV 8.0 7.0 - 10.0 fL   INR   Result Value Ref Range    INR 1.40 (H) 0.90 - 1.10   Manual Differential   Result Value Ref Range    Total Neutrophils % 12 (L) 50 - 70 %    Lymphocytes % 84 (H) 20 - 40 %    Monocytes % 4 2 - 10 %    Eosinophils %  0 0 - 6 %    Basophils % 0 0 - 2 %    Total Neutrophils Absolute 0.1 (L) 2.0 - 7.7 thou/ul    Lymphocytes Absolute 0.8 0.8 - 4.4 thou/uL    Monocytes Absolute 0.0 0.0 - 0.9 thou/uL    Eosinophils Absolute 0.0 0.0 - 0.4 thou/uL    Basophils Absolute 0.0 0.0 - 0.2 thou/uL    Platelet Estimate Decreased (!) Normal    Ovalocytes 1+ (!) Negative   Type and Screen   Result Value Ref Range    ABORh A NEG        Imaging: Imaging results 30 days: Mr Head With Without Contrast    Result Date: 2/23/2017  HEAD MRI WITHOUT AND WITH IV CONTRAST 2/22/2017 2:59 PM INDICATION: Follow-up radiation necrosis - please evaluate relative cerebral blood flow. Metastatic lung cancer to brain. TECHNIQUE: Head MRI without and with intravenous contrast. CONTRAST: 20ml Magnevist COMPARISON: Brain MRI 11/15/2016, 08/16/2016, 04/25/2016 FINDINGS: Diffusion weighted images demonstrate no areas acute/subacute infarcts. Since the previous brain MRI, there has been interval increase in the T2 prolongation involving the anterolateral right frontal lobe and associated enhancement. The enhancing component now measures 6 x 6.5 mm (transverse times AP). There is persistent associated hemosiderin staining along the anterior lateral aspect of this lesion. Redemonstration of the masslike T2 prolongation involving the right centrum semiovale and corona radiata. The T2 prolongation appears slightly increased compared to previous brain MRI 11/15/2016 but significantly decreased compared to 08/16/2016. There is slightly more the susceptibility artifact associated with the lesion in the right centrum semiovale/corona radiata compared  to the most recent brain MRI. Although this lesion also demonstrates some intrinsic T1 shortening, there is also associated enhancement. The enhancing component is actually slightly decreased compared to brain MRI 11/15/2016 (7.5 x 20.5 x 15 mm versus 8.5 x 21.5 x 18 mm). No other areas of abnormal intracranial enhancement. I'll diffuse cerebral parenchymal volume loss. No midline shift. The basilar cisterns are patent. The flow-voids of the directly imaged major intracranial arteries and major dural venous sinuses appear  patent. No abnormal signal within the orbits. Mild mucosal thickening of the maxillary sinuses and ethmoid air cells. No abnormal signal within the mastoid air cells. Normal marrow signal in the bones of the calvaria and skull base. MR perfusion images demonstrate no increased relative cerebral blood volume corresponding to the lesion in the right centrum semiovale/corona radiata. It is difficult to evaluate the MR perfusion status of the lesion in the right anterior frontal lobe given its small size and peripheral location.     CONCLUSION: 1.  There has been mixed response since brain MRI 11/15/2016. 2.  Interval increase in the T2 prolongation involving the anterolateral right frontal lobe and associated enhancement. It is difficult to evaluate the MR perfusion status of this lesion given its small size and peripheral location. 3.  The enhancement associated with the lesion in the right centrum semiovale and corona radiata has slightly decreased since previous brain MRI as described above. The surrounding T2 prolongation is stable to slightly increased compared to 11/15/2016 but significantly decreased compared to 08/16/2016. Interval increase in the hemosiderin staining associated with this lesion. Given the lack of increased relative cerebral blood volume, these could be related to posttreatment related changes. Attention on follow-up studies. 4.  No new intracranial lesion, acute/subacute  infarcts or hydrocephalus.      Pathology:   No results found for this or any previous visit (from the past 2160 hour(s)).    Signed by: Lindsey Dacosta MD

## 2021-06-09 NOTE — PROGRESS NOTES
Patient here ambulatory for follow up s/p radiation for metastatic lung cancer.  Patient states he is having a bad day today.  Feels as though he has very low blood counts.  Patient c/o generalized weakness.  MRI of head done 2/22/17 and patient is here today for results.  Seen by Dr. Dacosta.  Patient due tomorrow for mid cycle check, took patient via wheelchair to medical oncology for labs and evaluation.  Plan RTC for radiation follow up as directed by physician.

## 2021-06-09 NOTE — PROGRESS NOTES
"Patient arrived ambulatory to clinic today for IV fluid support. PAC accessed with ramirez blood return. He reports feeling unwell, specifically nauseous. He sat in chair and began to cough. After cough settled down he ate a granola bar and orange. He began coughing again and induced vomiting. Assisted with emesis bags and washcloths. Patient explained that he wakes up nauseous every day Called on-call MD, Dr. Bello, and obtained order for zofran iv 8 mg x1. Hung IVF and administered zofran. Patient comfortable and asleep in chair shortly after infusion started. IVF completed and PAC heparinized and DC'd/ Patient \"got his bearings\" in chair for a few minutes prior to leaving. Encouraged patient to try taking PO zofran at home upon waking to prevent nausea and vomiting.  He voiced understanding. Offered wheelchair ride to patient who refused and reports he will \"go slow.\" Pt left clinic ambulatory and will RTC tomorrow for IVF support. Arlyn Skaggs    "

## 2021-06-09 NOTE — PROGRESS NOTES
1140 Informed ASHLI Meza of labs. Orders for mag sulf written, request to monitor pt's temp.    1250 Pt's temps running in 98s; ASHLI Meza informed. Orders for chemo signed. Per ASHLI Meza, pt educated on signs/symptoms of anemia, when to call us.

## 2021-06-09 NOTE — PROGRESS NOTES
Pt. Presents to infusion center for IV fluids. Pt. C/o nausea and exp. x3 emesis upon arrival. Zofran IV administered with good results. Pt. C/o back pain and did not bring in his oxycodone, MONIKA Corado CNP ordered a dose for him. Pt. Continues to be very fatigue. Hemoglobin 7.1 and orders for 2 units of rbc's to be administered tomorrow. Potssium also low today and 20 meq. Of potassium administered IV. C/O chills and shakes occasionally. Temp. 99.1 orally.Neutropenic precautions reviewed. Pt. Verbalizes understanding. Appt. Scheduled for tomorrow am.

## 2021-06-09 NOTE — PROGRESS NOTES
Pt presents today for blood transfusion. Pt received 2 units rbc's and tolerated well. Pt hgb 7.0. Pt has some dizziness occasionally. Pt instructed to get up slowly. Pt sats good on room air. Pt co headache but he took tylenol just before coming and it is a  Little better. Pt has non prod cough. Pt states he is able to eat and drink a little better and is feeling a little better. Pt will come in Saturday and Sunday for fluids and has a 930 appt each day. Phyllis Sanchez RN

## 2021-06-09 NOTE — PROGRESS NOTES
Pt presented for neulasta injection and inr lab draw from port. Pt tolerated well and has been feeling tired. Pt is done working and will go home for a nap for the day. Phyllis Sanchez RN

## 2021-06-09 NOTE — PROGRESS NOTES
Patient presents for scheduled IVFs.  He was seen yesterday and found to be dehydrated.  He was given a liter of IVFs with 3 gr magnesium.  He also was transfused with 2u pRBCs over the past 2 days for a hemoglobin @ 6.8.  He is quite nauseated today and has thrown up in infusion.  He also noted an episode of diarrhea since yesterday, possibly due to or exacerbated by the IV magnesium.  The diarrhea was not associated with any cramping.  He denies fever or chills.  Will give 4 mg Zofran and 20 mg Pepcid with IVFs today.  Encouraged to resume oral omeprazole at home 1-2 times daily as his symptoms suggest possibly acid related. Will recheck BMP and CBC.  IVFs tomorrow and possibly the w/e as well.

## 2021-06-09 NOTE — PROGRESS NOTES
[facility] Hematology and Oncology Progress Note    Patient: Jack Ricketts  MRN: [id number]  Date of Service: 2/20/2017         Reason for Visit:    D1C3 carboplatin + Etoposide palliative chemotherapy.    Assessment and Plan:    1)  Metastatic mixed small cell and non-small cell lung cancer:     31 y.o.     2012, September - diagnosed with what was thought to be small cell lung cancer, limited stage, located in the mediastinum measuring 10 cm.    Biopsy confirmed CK7 positive, synaptophysin positive chromogranin expressing cells.     09/2012 - initial treatment with chemoradiation therapy (cisplatin and -16). Posttreatment PET scan showed only a minimal response. Rebiopsy revealed a component of nonsmall cell lung cancer as well, EGFR and ALK-1 mutation negative.     06/2013 - referred to Dr. Jose Amezcua, [facility], who recommended Taxotere chemotherapy. The patient opted for radiation therapy (CyberKnife) with radiosensitizing weekly carboplatin and Taxol chemotherapies.      02/2014, PET scan (compared to prior PET scan in November, 2013) revealed subcarinal node hypermetabolism compatible with active metastasis.  March - July completed 6 cycles palliative topotecan chemotherapy.  7/16/2014 PET/CT (compared to 04/28/2014) revealed persistent but decreased subcarinal hypermetabolism with no new hypermetabolic lesions identified. Due to significant fatigue secondary to chemotherapy, he was given a break from treatment. On 09/08/2014 he resumed cycle #7 topotecan with Avastin added. In November, 2014, he was only able to get day 1 of the topotecan and Avastin due to fatigue.     12/2014 - 02/2015 maintenance Avastin.    03/11/15 PET showed recurrent mild focal FDG activity in the subcarinal region of the mediastinum and new 2.3 cm FDG avid lesion in the liver, suspicious for metastatic lung cancer    03/19/15 liver biopsy negative    08/5/15 PET showed progressive disease in a subcarinal lymph  node, enlarging and increasingly FDG avid hepatic metastasis, and renewed tumor metabolism at the primary tumor site in the right hilum.    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     08/19/15 MRI brain - multiple enhancing lesions in the bilateral cerebral hemispheres          9/04/15 completed 2,200 cGy CyberKnife radiosurgery.     8/13-9/21/15 completed 2 cycles Alimta with Avastin    10/12/15 LFT elevation prompted restaging PET showing disease progression.    10/19 - 12/28/15 - 3+ cycles Topotecan with Avastin chemotherapy.    1/13/16 restaging PET - progressive adenopathy and right liver disease.    PD-L1 positive @ 60%.      02/03-07/18/16 - 9 cycles Keytruda     07/11/16 PET - Area of active tumor metabolism in the right hilum has enlarged and increased in FDG activity. A single right hilar lymph node has become moderately FDG avid. No evidence of active tumor metabolism outside of the right hemithorax.    07/26/16 EBUS pathology showed recurrent neuroendocrine carcinoma.      08/15/16 - 3 day history of left sided weakness, occasional speech slurring and headaches yet managed with Tylenol.      08/16/16 brain MRI - showed radiation necrosis.  Began dex 4 mg t.i.d. with food.      08/22 - 10/24/16 - 3 cycles Opdivo.    10/25 - 11/08/16 - hospitalized @ 's ICU with acute respiratory failure - suspected Opdivo related.  Treated emperically for PCP and placed on steroids.  Bronch showed no evidence of malignancy, only mixed inflammatory cells.  No pneumocystis carinii.      11/15/16 MR head - interval decrease in the irregular enhancement as well as the T2 prolongation in the right corona radiata and right periventricular region.  No new enhancing intracranial lesions.    11/21/16 follow up Dr. Dacosta [facility] - head MRI improved.  Off dexamethasone.  Follow up in 3 months with MRI head.    12/15 - 12/20/16 hospitalized with shortness  of breath.  Found to have a PE as well as CT showing progression of his lung cancer.      12/05/16 - D1C1 carboplatin + Etoposide palliative chemotherapy followed by Neulasta.    Significant neutropenia  ( / ).    01/30/17 - D1C2 carboplatin + Etoposide palliative chemotherapy followed by 5 days 480 mcg Neupogen.  Neupogen did not work better than Neulasta.  Will return to Neulasta.  Patient requests injection rather than take-home.     02/20/17 - looks and feels quite good other than fatigue.    CBC - WBC, ANC and Plts WNL.  Hgb 7.7  Will hold on but have a low threshold for pRBC transfusion if concernedly symptomatic while > 7.0    CMP with magnesium - BUN and magnesium a bit low.  Otherwise WNL.    Tolerated C2 carboplatin + Etoposide palliative chemotherapy quite well subjectively.     Proceed with D1C3 carboplatin + Etoposide with Neulasta palliative chemotherapy.     Continue Bactrim M, W and Fridays.      If fever > 100.4 he is to present to ED.      03/01/17 - D10C3 follow up    03/13/17 - follow up with Dr Hoyos @ [facility] with restaging imaging.    2) Cough and wheezing:    Not using supplemental 02 any longer.    01/04/17 - started prednisone 20 mg a day.    Wheeze pretty much gone.  Cough greatly improved.    Using inhalers p.r.n.    Prednisone taper completed this past Saturday.    3) Hypothyroid:    Secondary to Pembrolizumab    On replacement.    02/03/17 TSH - 9.88.  Synthroid increased to 175 mcg daily.      02/20/17 TSH - 1.06    Recheck levels monthly.    4) PE:    12/15/16 CTA showed LLL low burden emboli.  Began Lovenox, 1 mg/kg twice a day.    02/01/17 - began transition to Coumadin.      02/08/17 INR 2.54.  Discontinue Lovenox.    02/20/17 INR 2.14    Continue alternating Coumadin @ 5 mg and 2.5 daily.     Recheck INR weekly.    5)   Emotional:     Previously met with Sara Fischer, psychotherapy.     He doesn't feel the need to regroup at this time.     He continues to have a  good support system with one of his two brothers and his father.    6) Nutrition:    Weight quite stable.    Albumin WNL.      Fair appetite.      Has met with Nutrition Services previously.  Due to his work he feels he has a good grasp on the deficits in his nutrition and does not want to see Nutrition Services again at this time.    He stopped taking Boost daily.      7) Hypomagnesemia:    1.4 today.    Taking SloMag twice daily.    Will give 3 grams IV today    ECOG Performance:  0-1    Distress Assessment:  Coping as well as can be expected.           > 25 minutes with > 50% counseling and care coordination    Derrick Corado, CNP    CC: Kvng Ramirez MD    ______________________________________________________________________________    Interim History:    The patient presents looking and feeling quite good but feeling a bit fatigued.  He stopped using his supplemental 02 a few weeks ago as he didn't think it was required any longer.  Last month he was restarted on 20 mg prednisone daily for wheezing and cough which he has now tapered off.   His appetite has been good.  He denies much nausea and has had no vomiting with his current treatment.  He continues on albuterol p.r.n.  He denies pain, fever, chills, headaches, visual or mentation disturbance, bowel or bladder issues.  His live-in girl friend was ill last week but has now recovered.  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  If he does not work he will loose his benefits.  His nurse Navigators are assisting him with this.  He may need to go on Social Security.     Review of Systems:    10 point review of systems negative other than what is mentioned in HPI.    Past Histories:    Past Medical History:   Diagnosis Date     Brain metastases      Cough      Hypomagnesemia 12/16/2016     Lung cancer          Past Surgical History:   Procedure Laterality Date     LUNG BIOPSY       PICC  10/26/2016          MIGUEL  PLACEMENT      venous port     THYROID SURGERY         Physical Exam:    Recent Vitals 2/20/2017   Weight 262 lbs   /72   Pulse 102   Temp 99.9   Temp src 1   SpO2 95     GENERAL:   Pleasant male.  Alert and oriented x 3.  No acute distress.  Cushingoid symptoms markedly improved.  HEENT:   Anicteric sclerae. EOMI. PERRLA. Oropharynx unremarkable.    LYMPH NODES:  No appreciable adenopathy neck, axilla, groin.   HEART:   S1S2, no murmur heard.   LUNGS:   Clear with diminished breath sounds.  No rhonchi or wheezes.   ABDOMEN:   No appreciable organomegaly, masses or ascites.   EXTREMITIES:  Mild ankle edema resolved.    SKIN:   Folliculitis resolved face, neck and chest.    Lab Results:    Reviewed with patient.    Recent Results (from the past 24 hour(s))   Magnesium   Result Value Ref Range    Magnesium 1.4 (L) 1.8 - 2.6 mg/dL   Comprehensive Metabolic Panel   Result Value Ref Range    Sodium 140 136 - 145 mmol/L    Potassium 3.5 3.5 - 5.0 mmol/L    Chloride 102 98 - 107 mmol/L    CO2 27 22 - 31 mmol/L    Anion Gap, Calculation 11 5 - 18 mmol/L    Glucose 103 70 - 125 mg/dL    BUN 5 (L) 8 - 22 mg/dL    Creatinine 1.05 0.70 - 1.30 mg/dL    GFR MDRD Af Amer >60 >60 mL/min/1.73m2    GFR MDRD Non Af Amer >60 >60 mL/min/1.73m2    Bilirubin, Total 0.6 0.0 - 1.0 mg/dL    Calcium 9.1 8.5 - 10.5 mg/dL    Protein, Total 6.9 6.0 - 8.0 g/dL    Albumin 3.6 3.5 - 5.0 g/dL    Alkaline Phosphatase 69 45 - 120 U/L    AST 20 0 - 40 U/L    ALT 26 0 - 45 U/L   HM1 (CBC with Diff)   Result Value Ref Range    WBC 8.2 4.0 - 11.0 thou/uL    RBC 2.65 (L) 4.40 - 6.20 mill/uL    Hemoglobin 7.7 (L) 14.0 - 18.0 g/dL    Hematocrit 23.2 (L) 40.0 - 54.0 %    MCV 88 80 - 100 fL    MCH 29.2 27.0 - 34.0 pg    MCHC 33.3 32.0 - 36.0 g/dL    RDW 21.1 (H) 11.0 - 14.5 %    Platelets 223 140 - 440 thou/uL    MPV 7.8 7.0 - 10.0 fL   INR   Result Value Ref Range    INR 2.14 (H) 0.90 - 1.10   Manual Differential   Result Value Ref Range    Total  Neutrophils % 67 50 - 70 %    Lymphocytes % 19 (L) 20 - 40 %    Monocytes % 14 (H) 2 - 10 %    Eosinophils %  0 0 - 6 %    Basophils % 0 0 - 2 %    Total Neutrophils Absolute 5.5 2.0 - 7.7 thou/ul    Lymphocytes Absolute 1.6 0.8 - 4.4 thou/uL    Monocytes Absolute 1.1 (H) 0.0 - 0.9 thou/uL    Eosinophils Absolute 0.0 0.0 - 0.4 thou/uL    Basophils Absolute 0.0 0.0 - 0.2 thou/uL    Manual nRBC per 100 Cells 3 (H) 0-<1    Reactive Lymphocytes 2+ (!) Negative    Platelet Estimate Normal Normal    Polychromasia 1+ (!) Negative    Tear Drop Cells 1+ (!) Negative   TSH   Result Value Ref Range    TSH 1.06 0.30 - 5.00 uIU/mL         Imaging:    No new imaging.

## 2021-06-09 NOTE — PROGRESS NOTES
"Met with patient today in his hospital room.  He was in good spirits, and was very talkative.  He did let me know that he thinks he got the [employer] piece figured out in regards to short term disability, and that he is \"inactive\" employment with [employer].  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  I did give him the [employer] brochure, since it sounds like his trip to [place] may be postponed.  I also talked with him again about the [employer], and he would find that very helpful again.  I offered him emotional support and encouragement.  He denies the need for any additional resources at this time.  Gertrude Ohara RN  "

## 2021-06-09 NOTE — PROGRESS NOTES
Pt called Dr Hoyos and discussed sxs, including abdominal pain, loose stools and excess gas. Dr Hoyos ordered labs and had pt wait here at .  Dr Hoyos was called with lab results,wanted pt to be reassured. Tell him to take Fibercon daily and ok to use simethicone.   Monitor for fevers, worsening abdominal pain, call or be seen if this happens. The patient  is aware of plan and verbalizes agreement. Annemarie Martínez RN

## 2021-06-09 NOTE — PROGRESS NOTES
Pt amb into clinic for mag sulf and chemo infusion. Pt c/o increased fatigue. Pt tolerated mag sulf well. Pt premedicated. Pt tolerated chemo infusions well. Reviewed anemia information; hand-out given. Port flushed and de-accessed per pt request. Pt amb out of clinic.

## 2021-06-09 NOTE — PROGRESS NOTES
[facility] Hematology and Oncology Progress Note    Patient: Jack Ricketts  MRN: [id number]  Date of Service: 3/1/2017         Reason for Visit:    D10C3 carboplatin + Etoposide palliative chemotherapy.    Assessment and Plan:    1)  Metastatic mixed small cell and non-small cell lung cancer:     31 y.o.     2012, September - diagnosed with what was thought to be small cell lung cancer, limited stage, located in the mediastinum measuring 10 cm.    Biopsy confirmed CK7 positive, synaptophysin positive chromogranin expressing cells.     2012, December - completed initial treatment with chemoradiation therapy (4 cycles cisplatin and -16).     Posttreatment PET scan showed only a minimal response.     Rebiopsy revealed a component of nonsmall cell lung cancer as well, EGFR and ALK-1 mutation negative.     06/2013 - referred to Dr. Jose Amezcua, [facility], who recommended Taxotere chemotherapy.     2013, July - completed CyberKnife with 4 weekly doses radiosensitizing carboplatin and Taxol chemotherapies.      02/2014, PET scan (compared to prior PET scan in November, 2013) revealed subcarinal node hypermetabolism compatible with active metastasis.      2014, July - completed 6 cycles palliative topotecan chemotherapy.      7/16/2014 PET/CT (compared to 04/28/2014) revealed persistent but decreased subcarinal hypermetabolism with no new hypermetabolic lesions identified.     Due to significant fatigue secondary to chemotherapy, he was given a break from treatment.     2014, September - November resumed cycle #7 topotecan with Avastin added.     In November, 2014, he was only able to get day 1 of the topotecan and Avastin due to fatigue.     2014, December - 2015, February received maintenance Avastin.    03/11/15 PET showed recurrent mild focal FDG activity in the subcarinal region of the mediastinum and new 2.3 cm FDG avid lesion in the liver, suspicious for metastatic lung cancer    03/19/15 liver  biopsy negative    08/5/15 PET showed progressive disease in a subcarinal lymph node, enlarging and increasingly FDG avid hepatic metastasis, and renewed tumor metabolism at the primary tumor site in the right hilum.    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     08/19/15 MRI brain - multiple enhancing lesions in the bilateral cerebral hemispheres.          2015, September 4 - completed 2,200 cGy CyberKnife radiosurgery.     2015, September - completed 2 cycles Alimta with Avastin    10/12/15 LFT elevation prompted restaging PET showing disease progression.    2015, December - completed 3+ cycles Topotecan with Avastin chemotherapy.    01/13/16 restaging PET - progressive adenopathy and right liver disease.    PD-L1 positive @ 60%.      2016, July 18 - completed 9 cycles Keytruda     07/11/16 PET - Area of active tumor metabolism in the right hilum has enlarged and increased in FDG activity. A single right hilar lymph node has become moderately FDG avid. No evidence of active tumor metabolism outside of the right hemithorax.    07/26/16 EBUS pathology showed recurrent neuroendocrine carcinoma.      08/15/16 - 3 day history of left sided weakness, occasional speech slurring and headaches yet managed with Tylenol.      08/16/16 brain MRI - showed radiation necrosis.  Began dex 4 mg t.i.d. with food.      2016, October 24 - completed 3 cycles Opdivo.    10/25 - 11/08/16 - hospitalized @ 's ICU with acute respiratory failure - suspected Opdivo related.  Treated emperically for PCP and placed on steroids.  Bronch showed no evidence of malignancy, only mixed inflammatory cells.  No pneumocystis carinii.      11/15/16 MR head - interval decrease in the irregular enhancement as well as the T2 prolongation in the right corona radiata and right periventricular region.  No new enhancing intracranial lesions.    11[facility]21[facility].  Off dexamethasone.  Follow up in 3 months with MRI head.    12/15 - 12/20/16 hospitalized with shortness of breath.  Found to have a PE as well as CT showing progression of his lung cancer.      12/05/16 - D1C1 carboplatin + Etoposide palliative chemotherapy followed by Neulasta.    Significant neutropenia with take-home Neulasta ( / ).    01/30/17 - D1C2 carboplatin + Etoposide palliative chemotherapy followed by 5 days 480 mcg Neupogen.  Neupogen did not work better than Neulasta.    02/20/17 - D1C3 carboplatin + Etoposide palliative chemotherapy followed by clinic-administered Neulasta.    03/01/17 - midcycle, tolerated C2 carboplatin + Etoposide palliative chemotherapy quite well subjectively.     CBC - WBC 1.0, ANC 0.1.  Plts 93,000.  Hgb 6.8.  Transfuse with 2 units pRBC.  Neutropenic precautions reviewed.  If fever > 100.4 he is to present to ED.    CMP + magnesium - magnesium a bit low - will add 3 grams IV today with IV hydration.  Bili a bit high - will give 1 liter IVFs today.  Otherwise CMP WNL.    Continue Bactrim M, W and Fridays.      Daily IVFs as needed.    03/13/17 - follow up with Dr Hoyos @ [facility] with restaging imaging.    2) Cough and wheezing:    Not using supplemental 02 any longer.    01/04/17 - started prednisone 20 mg a day.    Wheeze pretty much gone.  Cough rare.    Prednisone taper completed a couple weeks ago.     Continue inhalers p.r.n.    3) Hypothyroid:    Secondary to Pembrolizumab    On replacement.    02/03/17 TSH - 9.88.  Synthroid increased to 175 mcg daily.      02/20/17 TSH - 1.06    Recheck levels monthly.    4) PE:    12/15/16 CTA showed LLL low burden emboli.  Began Lovenox, 1 mg/kg twice a day.    02/01/17 - began transition to Coumadin.      02/08/17 INR 2.54.  Discontinued Lovenox.    02/28/17 INR 1.4    Change Coumadin from alternating 5 mg and 2.5 daily to 5 mg daily.     Recheck INR next Monday.    5)   Emotional:     Previously  met with Sara Fischer, psychotherapy.     He doesn't feel the need to regroup at this time.     He continues to have a good support system with one of his two brothers and his father.    6) Nutrition:    Weight down 5 pounds.    Albumin WNL.      Appetite poor.      Has met with Nutrition Services previously.  Due to his work he feels he has a good grasp on the deficits in his nutrition and does not want to see Nutrition Services again at this time.    Resumed Boost daily.      7) Hypomagnesemia:    1.5 today.    Taking SloMag twice daily.    Will give 3 grams with 1 liter IVFs today    ECOG Performance:  0-1    Distress Assessment:  Coping as well as can be expected.           > 25 minutes with > 50% counseling and care coordination    Derrick Corado, CNP    CC: Kvng Ramirez MD    ______________________________________________________________________________    Interim History:    The patient presents quite fatigued and pale.  He states this round of chemotherapy was very hard on him.  He is drinking fluids but his appetite is very poor.  He is eating, just not hungry.  He stopped using his supplemental 02 a few weeks ago as he didn't think it was required any longer.  He continues on albuterol p.r.n.  He has tapered off prednisone which was started for wheezing and cough.  He denies much nausea and has had no vomiting with his current treatment.  He denies pain, fever, chills, headaches, visual or mentation disturbance, bowel or bladder issues.  His live-in girl friend was ill a couple weeks but recovered.  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  If he does not work he will loose his benefits.  His nurse Navigators are assisting him with this.  He may need to go on Social Security.    Review of Systems:    10 point review of systems negative other than what is mentioned in HPI.    Past Histories:    Past Medical History:   Diagnosis Date     Brain metastases      Cough       Hypomagnesemia 12/16/2016     Lung cancer          Past Surgical History:   Procedure Laterality Date     LUNG BIOPSY       PICC  10/26/2016          PORTACATH PLACEMENT      venous port     THYROID SURGERY         Physical Exam:    Recent Vitals 2/28/2017   /61   Pulse 117   Temp 98.6   Temp src 1   SpO2 97     GENERAL:   Pleasant male.  Alert and oriented x 3.  No acute distress.  Cushingoid symptoms markedly improved.  HEENT:   Anicteric sclerae. EOMI. PERRLA. Oropharynx unremarkable.    LYMPH NODES:  No appreciable adenopathy neck, axilla, groin.   HEART:   S1S2, no murmur heard.   LUNGS:   Clear with diminished breath sounds.  No rhonchi or wheezes.   ABDOMEN:   No appreciable organomegaly, masses or ascites.   EXTREMITIES:  No edema.    SKIN:   Folliculitis resolved face, neck and chest.    Lab Results:    Reviewed with patient.    Recent Results (from the past 24 hour(s))   Magnesium   Result Value Ref Range    Magnesium 1.5 (L) 1.8 - 2.6 mg/dL   Comprehensive Metabolic Panel   Result Value Ref Range    Sodium 139 136 - 145 mmol/L    Potassium 3.9 3.5 - 5.0 mmol/L    Chloride 102 98 - 107 mmol/L    CO2 27 22 - 31 mmol/L    Anion Gap, Calculation 10 5 - 18 mmol/L    Glucose 107 70 - 125 mg/dL    BUN 19 8 - 22 mg/dL    Creatinine 0.93 0.70 - 1.30 mg/dL    GFR MDRD Af Amer >60 >60 mL/min/1.73m2    GFR MDRD Non Af Amer >60 >60 mL/min/1.73m2    Bilirubin, Total 1.5 (H) 0.0 - 1.0 mg/dL    Calcium 9.5 8.5 - 10.5 mg/dL    Protein, Total 7.3 6.0 - 8.0 g/dL    Albumin 3.8 3.5 - 5.0 g/dL    Alkaline Phosphatase 67 45 - 120 U/L    AST 17 0 - 40 U/L    ALT 28 0 - 45 U/L   HM1 (CBC with Diff)   Result Value Ref Range    WBC 1.0 (LL) 4.0 - 11.0 thou/uL    RBC 2.42 (L) 4.40 - 6.20 mill/uL    Hemoglobin 6.8 (LL) 14.0 - 18.0 g/dL    Hematocrit 20.2 (L) 40.0 - 54.0 %    MCV 84 80 - 100 fL    MCH 28.0 27.0 - 34.0 pg    MCHC 33.5 32.0 - 36.0 g/dL    RDW 23.1 (H) 11.0 - 14.5 %    Platelets 93 (L) 140 - 440 thou/uL    MPV 8.0  7.0 - 10.0 fL   INR   Result Value Ref Range    INR 1.40 (H) 0.90 - 1.10   Manual Differential   Result Value Ref Range    Total Neutrophils % 12 (L) 50 - 70 %    Lymphocytes % 84 (H) 20 - 40 %    Monocytes % 4 2 - 10 %    Eosinophils %  0 0 - 6 %    Basophils % 0 0 - 2 %    Total Neutrophils Absolute 0.1 (L) 2.0 - 7.7 thou/ul    Lymphocytes Absolute 0.8 0.8 - 4.4 thou/uL    Monocytes Absolute 0.0 0.0 - 0.9 thou/uL    Eosinophils Absolute 0.0 0.0 - 0.4 thou/uL    Basophils Absolute 0.0 0.0 - 0.2 thou/uL    Platelet Estimate Decreased (!) Normal    Ovalocytes 1+ (!) Negative   Type and Screen   Result Value Ref Range    ABORh A NEG     Antibody Screen Negative Negative   Crossmatch   Result Value Ref Range    Crossmatch Compatible     Blood Expiration Date [id number]     Unit Type A Neg     Unit Number [id number]     Status Ready     Component Red Blood Cells     PRODUCT CODE A0383S24     Blood Type 0600     CODING SYSTEM [id number]    Crossmatch   Result Value Ref Range    Crossmatch Compatible     Blood Expiration Date [id number]     Unit Type A Neg     Unit Number [id number]     Status Transfused     Component Red Blood Cells     PRODUCT CODE M9036E20     Issue Date and Time [id number]     Blood Type 0600     CODING SYSTEM [id number]        Imaging:    No new imaging.

## 2021-06-10 NOTE — PROGRESS NOTES
[facility] Hematology and Oncology Progress Note    Patient: Jack Ricketts  MRN: [id number]  Date of Service: 4/17/2017           Reason for visit      1. Non-small cell cancer of right lung    2. Pulmonary embolism    3. Anemia         Assessment     1. Jack is very pleasant 31 y.o. gentleman with  non-small cell type of a lung cancer initially stage IIIB and later on developed liver metastases, treated with concurrent chemoradiation therapy with cisplatin and etoposide as a small cell lung cancer then given more radiation and weekly carbo platinum and Taxol.  He had recurrence documented February 2014 and has received 6 cycles of topotecan. In December 2014 started on maintenance Avastin.  PET scan in March 2015 showed questionable recurrence in the liver.  In August 2015 Biopsy confirmed NSCLC with neuroendocrine differentiation. Started on Alimta and Avastin.  He developed brain metastases while on that.  Treatment changed to topotecan with Avastin.  In January 2016 PET scan showed progression again and at that time he got started on on pembrolizumab.  PET scan in August 2016 showed slight worsening in the right hilar area.  So in August 2016 he started on Opdivo.  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  He was actually on treatment break recovering from pneumonitis that he got admitted at [facility] with pulmonary embolism.  His CT scan showed progression of his lung cancer.  In December 2016 started on carboplatin and etoposide.  3 cycles.  Septic episodes after that needing intubation and pressors.  In the beginning of April 2017 came in with polyarthritis and on prednisone again.  2. Brain metastases which have been treated with CyberKnife and appeared to be responding well. 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    3. Hypothyroidism due to Pembrolizumab. On replacement.  4. Pulmonary embolism was on Lovenox.   Currently on no anticoagulation.  5. Significant joint pains in the beginning of April 2017.  Better on prednisone.  6. C. difficile colitis diagnosed in April 2017.    Plan     1. At this time I am going to wait and watch.  Have him come back in about 5 weeks with a repeat PET scan.  2. Gave him a schedule of tapering down his prednisone.  3. Follow-up with MRI to check on his brain lesions.  4.   Discussed with him that most likely is going to need chemotherapy again.  My choice of next chemotherapy would be single agent Abraxane.    Stage      Lung Cancer    Primary site: Lung (Right) small cell as well as non-small cell lung cancer adenocarcinoma    Clinical: Stage IIIA (T2b, N2, M0) signed by Nas Hoyos MD on 9/26/2014  1:17 PM    Summary: Stage IIIA (T2b, N2, M0)      History     Jack Ricketts is a very pleasant 31 y.o. old male with a history of what was thought to be small cell lung cancer, limited stage, in 09/2012 located in the mediastinum measuring 10 cm.  Biopsy confirmed CK7 positive, synaptophysin positive chromogranin expressing cells.  Initial treatment involved chemoradiation therapy with cisplatin and -16.  Post treatment PET scan showed only a minimal response.  Rebiopsy revealed a component of nonsmall cell lung cancer as well, EGFR and ALK-1 mutation negative.  He was referred to Dr. Jose Amezcua [facility] who recommended Taxotere chemotherapy.  The patient opted for radiation therapy (CyberKnife) with radiosensitizing weekly carboplatin and Taxol chemotherapies initiated in June 2013.  He tolerated treatment quite well.  He had complete response to the treatment.       February, 2014, PET scan (compared to prior PET scan in November, 2013) revealed subcarinal node hypermetabolism compatible with active metastasis. This was also biopsied and proven to be malignant and suggestive of small cell type of cancer.  We started him on palliative topotecan chemotherapy day 1  through 5 every 3 weeks.  Cycle one was initiated on 03/04/2014 and he finished 6 cycles of that.  His PET scan after 3 cycles showed minimal response then after 6 cycles his PET scan showed continued but incomplete response.  He did need some transfusion after the fifth cycle.    After couple months break was resumed his treatment with topotecan and Avastin.  He had 3 cycles .  His PET scan after the 2nd cycle had shown complete response.  He started on that his third cycle but could not completed because of respiratory infections.  After that in December 2014 he started on Avastin as a single agent for maintenance.  He has been on that until March 2015 when PET scan that actually showed a questionable uptake in his liver as well as the subcarinal lymph node.  He got a CT-guided biopsy of the liver lesion which was non diagnostic. At the time of the biopsy they had trouble locating the lesion.  Then in August 2015 the liver lesions got bigger and we biopsied again and came back positive for neuroendocrine carcinoma of lung origin.  He was started on treatment with Alimta and Avastin.  However he developed brain metastases in September.  Treated with CyberKnife therapy.  We change his treatment to topotecan and Avastin.  He has had couple cycles of that.  He became quite anemic and needed transfusion last week.  His brain MRI in the beginning of January 2016 showed improvement.  However his PET scan in January 2016 showed worsening in the liver lesion.    We tested his tumor for PDL-1 mutation and he did have favorable mutation for which pembrolizumab would be a targeted agent. He started on that in the end of January 2016.  He was noted to be hypothyroid after 3 cycles. Started on synthroid. He did notice depression which is better after synthroid. In August 2016 his PET scan showed progression. He was started on Opdivo. He did fine initially but then later on in October he started to have pneumonitis-type of  picture.  It was felt to be secondary to Opdivo.    He was put on some steroid Opdivo was stopped.    He was admitted at [facility] second week of December 2016 with shortness of breath.  He was found to have pulmonary embolism as well as his CT scan showed progression of his lung cancer.  We started him on palliative chemotherapy with carboplatin and etoposide in December and he got 3 cycles of it.  He needed Neulasta.  He had a rough time after the third cycle.  He was admitted with pneumonia and other and problems.    He was admitted again later on with some septic type of picture at [facility].  He needed to be intubated and was on pressors.  He made very good recovery.  His treatment has been held since that time.  In the beginning of April he was admitted at [facility] with fever and polyarthritis.  It was felt that he was having autoimmune joint issues due to prior chemotherapy as well as immunotherapy.  Started on prednisone.  He is feeling a lot better.  Comes in today for follow-up.  He did have a CT scan done in the hospital which showed that his lung cancer was stable but showed some colitis.  His labs showed C. difficile colitis.  He is on vancomycin.    Past Medical History     Bronchitis, lung cancer, renal insufficiency    Family History   Problem Relation Age of Onset     Cancer Mother      Breast cancer Mother      No Medical Problems Father      No Medical Problems Brother      No Medical Problems Brother      Social History   Substance Use Topics     Smoking status: Never Smoker     Smokeless tobacco: Never Used     Alcohol use No      Comment: Very rare       Stage      Lung Cancer    Primary site: Lung (Right)    Clinical: Stage IIIA (T2b, N2, M1b) signed by Nas Hoyos MD on 8/20/2015 10:08 AM    Summary: Stage IIIA (T2b, N2, M1b)     Treatment History     1. September 2012 cisplatin and -16 along with concurrent radiation therapy for 2 cycles.  2. Weekly  carboplatin and Taxol along with radiation therapy in June 2013.  3. March 2014 topotecan day 1 through 5 every 3 weeks for 6 cycles  4. September 2014 topotecan and Avastin ×3 cycles.  Third cycle was cut Short.  5. December 2014 Avastin 15 mg/kg every 3 weeks maintenance treatment.  6. August 2015 Avastin and Alimta.  7. Developed brain metastases in September 2015 treated with CyberKnife.  8. November 2015 started on topotecan with Avastin.  PET scan in January 2016 showed progression.  9. February 2016 started on pembrolizumab.  10. Opdivo started in August 15, 2016.  11. Carboplatin and etoposide 12/05/16 - 02/20/17 completed 3 cycles     Review of Systems   Constitutional  Constitutional (WDL): Exceptions to WDL  Fatigue: Fatigue relieved by rest  Neurosensory  Neurosensory (WDL): All neurosensory elements are within defined limits  Cardiovascular  Cardiovascular (WDL): All cardiovascular elements are within defined limits  Pulmonary  Cough: Mild symptoms, nonprescription intervention indicated (dry)  Dyspnea: Shortness of breath with moderate exertion    Gastrointestinal  Gastrointestinal (WDL): All gastrointestinal elements are within defined limits  Genitourinary  Genitourinary (WDL): All genitourinary elements are within defined limits  Integumentary  Integumentary (WDL): All integumentary elements are within defined limits  Patient Coping  Patient Coping: Accepting  ECOG Performance   1  Pain Status  Currently in Pain: Yes  Accompanied by  Accompanied by: Alone      Vital Signs     Vitals:    04/17/17 1256   BP: 131/82   Pulse: 86   Temp: 98.2  F (36.8  C)   SpO2: 98%       Physical Exam     Constitutional: Oriented to person, place, and time and appears well-developed.   HEENT: Minimal cushingoid appearance. Normocephalic and atraumatic.  Eyes: Conjunctivae and EOM are normal. Pupils are equal, round, and reactive to light. No discharge.  No scleral icterus. Nose normal. Mouth/Throat: Oropharynx is clear  and moist. No oropharyngeal exudate.    NECK: Normal range of motion. Neck supple. No JVD present. No tracheal deviation present.  Scar from surgery and the neck is present.   CARDIOVASCULAR: Elevated heart rate, regular rhythm and intact distal pulses.  Exam reveals no gallop and no friction rub.  Systolic murmur present.  PULMONARY: Effort normal and breath sounds normal. No respiratory distress. No Wheezing but definitely has ronchi and coarse crepitations.  ABDOMEN: Soft. Bowel sounds are normal. No distension and no mass.  There is no tenderness. There is no rebound and no guarding. No HSM.  MUSCULOSKELETAL: Normal range of motion. No edema and no tenderness. Mild kyphosis, no tenderness.  LYMPH NODES: Has no cervical, supraclavicular, axillary and groin adenopathy.   NEUROLOGICAL: Alert and oriented to person, place, and time. No cranial nerve deficit.  Normal muscle tone. Coordination normal.   GENITOURINARY: Deferred exam.  SKIN: Skin is warm and dry. Rash on back of the neck . No erythema. No pallor.   EXTREMITIES: No cyanosis, no clubbing, no edema. No Deformity.  PSYCHIATRIC: He is somewhat better being on steroids.      Lab Results     Results for orders placed or performed in visit on 04/17/17   HM1 (CBC with Diff)   Result Value Ref Range    WBC 4.9 4.0 - 11.0 thou/uL    RBC 3.56 (L) 4.40 - 6.20 mill/uL    Hemoglobin 11.0 (L) 14.0 - 18.0 g/dL    Hematocrit 34.2 (L) 40.0 - 54.0 %    MCV 96 80 - 100 fL    MCH 30.9 27.0 - 34.0 pg    MCHC 32.2 32.0 - 36.0 g/dL    RDW 17.7 (H) 11.0 - 14.5 %    Platelets 91 (L) 140 - 440 thou/uL    MPV 9.9 8.5 - 12.5 fL    Neutrophils % 72 (H) 50 - 70 %    Lymphocytes % 20 20 - 40 %    Monocytes % 7 2 - 10 %    Eosinophils % 0 0 - 6 %    Basophils % 0 0 - 2 %    Neutrophils Absolute 3.5 2.0 - 7.7 thou/uL    Lymphocytes Absolute 1.0 0.8 - 4.4 thou/uL    Monocytes Absolute 0.4 0.0 - 0.9 thou/uL    Eosinophils Absolute 0.0 0.0 - 0.4 thou/uL    Basophils Absolute 0.0 0.0 - 0.2  thou/uL         Distress Assessment       Distress Assessment Score: 4 (financial)     Imaging Results       Ct Chest Abdomen Pelvis With Oral With Iv Cont    Result Date: 4/3/2017  CT CHEST, ABDOMEN, AND PELVIS 4/3/2017 4:46 PM      INDICATION: Fever, abdominal pain, dyspnea, metastatic lung cancer. TECHNIQUE: CT chest, abdomen, and pelvis. Dose reduction techniques were used. IV CONTRAST: Iohexol (Omni) 100 mL. COMPARISON: CT chest 03/05/2017, CT abdomen and pelvis 12/15/2016. FINDINGS: CHEST: Right perihilar mass with persistent occlusion of the right middle lobe bronchus with right middle lobe atelectasis. Groundglass interstitial opacities extend from the central lung to the periphery of the right lung, unchanged from previous. There  are also groundglass opacities throughout the left lung which have not appreciably changed. Trace right pleural effusion. Small pericardial effusion, slightly increased from previous.  ABDOMEN: Tiny residual lesion at the dome of the liver, not significantly changed from recent comparison studies. No new liver lesions. Normal spleen, pancreas, gallbladder, adrenal glands, and kidneys. PELVIS: There is new mild thickening of the ascending colon. No ascites or lymphadenopathy. MUSCULOSKELETAL: New focal inflammation in the fat between the left psoas and iliacus muscles (series 2 image 165). No bone lesions.     CONCLUSION: 1.  New mild thickening of the ascending colon suggesting a colitis. 2.  Findings in the chest have not changed. 3.  New focal inflammation between the left psoas and iliacus muscles is indeterminate.    Xr Chest Ap Portable    Result Date: 4/2/2017  XR CHEST AP PORTABLE 04/02/2017, 12:29 PM INDICATION: Fever in patient with right lung cancer. COMPARISON: 03/13/2017. FINDINGS: Fiducial markers right hilar region again seen. Bilateral perihilar and bibasilar opacities have decreased but persist. Shallow inspiration. Heart size within normal limits. Port-A-Cath  anterior left chest wall with left IJ central venous catheter tip in the low SVC.     Us Venous Legs Bilateral    Result Date: 4/3/2017  US VENOUS LEGS BILATERAL 4/2/2017 10:06 PM INDICATION: rule out dvt TECHNIQUE: Routine exam with compression, augmentation, and duplex utilizing 2D gray-scale imaging, Doppler interrogation with color-flow and spectral waveform analysis. COMPARISON: None. FINDINGS: The common femoral, femoral, popliteal, and segmentally visualized calf veins were evaluated. Right leg veins are negative for deep venous thrombosis. Left leg veins are negative for deep venous thrombosis. No popliteal cysts.     CONCLUSION: 1.  Bilateral leg veins are negative for DVT.    Us Aspiration Hematoma Seroma Or Fluid Collection    Result Date: 4/3/2017  1. PUNCTURE AND ASPIRATION LEFT KNEE 2. ULTRASOUND GUIDANCE 4/3/2017 11:00 AM INDICATION: polyarthritis, fever, rule out crystal arthropary, septic joint PROCEDURE: Informed consent obtained. Time out performed. The site was prepped and draped in sterile fashion. 10 mL of 1% lidocaine was infused into the local soft tissues. Under direct ultrasound guidance, a 22 gauge needle was placed into the fluid pocket and 5 ml of synovial fluid was aspirated. This was sent to the laboratory. RADIOLOGIC SUPERVISION AND INTERPRETATION: ULTRASOUND GUIDANCE: Images demonstrate the needle within the fluid pocket.     CONCLUSION: 1.  Status post ultrasound-guided puncture and aspiration of a small left knee joint effusion.      Signed by: Nas Hoyos MD

## 2021-06-10 NOTE — PROGRESS NOTES
[facility] Hematology and Oncology Inpatient Progress Note    Patient: Jack Ricketts  MRN: [id number]  Date of Service: 4/11/2017        Reason for Visit:    Follow up hospitalization with fever and polyarthralgia.     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     2012, September - diagnosed with what was thought to be small cell lung cancer, limited stage, located in the mediastinum measuring 10 cm.    Biopsy confirmed CK7 positive, synaptophysin positive chromogranin expressing cells.     2012, December - completed initial treatment with chemoradiation therapy (4 cycles cisplatin and -16).     Posttreatment PET scan showed only a minimal response.     Rebiopsy revealed a component of nonsmall cell lung cancer as well, EGFR and ALK-1 mutation negative.     06/2013 - referred to Dr. Jose Amezcua, [facility], who recommended Taxotere chemotherapy.     2013, July - completed CyberKnife with 4 weekly doses radiosensitizing carboplatin and Taxol chemotherapies.     02/2014, PET scan (compared to prior PET scan in November, 2013) revealed subcarinal node hypermetabolism compatible with active metastasis.     2014, July - completed 6 cycles palliative topotecan chemotherapy.     7/16/2014 PET/CT (compared to 04/28/2014) revealed persistent but decreased subcarinal hypermetabolism with no new hypermetabolic lesions identified.     Due to significant fatigue secondary to chemotherapy, he was given a break from treatment.     2014, September - November resumed cycle #7 topotecan with Avastin added.     In November, 2014, he was only able to get day 1 of the topotecan and Avastin due to fatigue.     2014, December - 2015, February received maintenance Avastin.    03/11/15 PET showed recurrent mild focal FDG activity in the subcarinal region of the mediastinum and new 2.3 cm  FDG avid lesion in the liver, suspicious for metastatic lung cancer    03/19/15 liver biopsy negative    08/5/15 PET showed progressive disease in a subcarinal lymph node, enlarging and increasingly FDG avid hepatic metastasis, and renewed tumor metabolism at the primary tumor site in the right hilum.    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     08/19/15 MRI brain - multiple enhancing lesions in the bilateral cerebral hemispheres.     2015, September 4 - completed 2,200 cGy CyberKnife radiosurgery.     2015, September - completed 2 cycles Alimta with Avastin    10/12/15 LFT elevation prompted restaging PET showing disease progression.    2015, December - completed 3+ cycles Topotecan with Avastin chemotherapy.    01/13/16 restaging PET - progressive adenopathy and right liver disease.    PD-L1 positive @ 60%.     2016, July 18 - completed 9 cycles Keytruda     07/11/16 PET - Area of active tumor metabolism in the right hilum has enlarged and increased in FDG activity. A single right hilar lymph node has become moderately FDG avid. No evidence of active tumor metabolism outside of the right hemithorax.    07/26/16 EBUS pathology showed recurrent neuroendocrine carcinoma.     08/15/16 - 3 day history of left sided weakness, occasional speech slurring and headaches yet managed with Tylenol.     08/16/16 brain MRI - showed radiation necrosis. Began dex 4 mg t.i.d. with food.     2016, October 24 - completed 3 cycles Opdivo.    10/25 - 11/08/16 - hospitalized @ 's ICU with acute respiratory failure - suspected Opdivo related. Treated emperically for PCP and placed on steroids. Bronch showed no evidence of malignancy, only mixed inflammatory cells. No pneumocystis carinii.     11/15/16 MR head - interval decrease in the irregular enhancement as well as the T2 prolongation in the right corona radiata and right periventricular region. No new  enhancing intracranial lesions.    11/21/16 follow up Dr. [facility] - head MRI improved. Off dexamethasone. Follow up in 3 months with MRI head.    12/15 - 12/20/16 hospitalized with shortness of breath. Found to have a PE as well as CT showing progression of his lung cancer.     He was started on Lovenox for anticoagulation which was later transitioned to warfarin.    12/05/16 - 02/20/17 completed 3 cycles carboplatin + Etoposide palliative chemotherapy followed by Neulasta.    03/05 - 03/18/17 - hospitalized with shortness of breath, hemoptysis and fever.     Progressed to respiratory failure and intubation.     It appeared he was bleeding from the right lung where there was cancer invasion of the bronchus. The bleeding stopped and he was extubated and discharged home.     It was decided not to continue anticoagulation.     The plan was for him to recuperate and then consider further treatment for the lung cancer.    04/03 - 04/07/17 - hospitalized with fever, hypotension, multiple joint pains and swellings and abdominal discomfort.     Left knee tapped - white blood cells in the fluid with about 45% neutrophils.     Procalcitonin quite low and lactic acid WNL.     CT CAP - quite stable in the chest.  Thickening of the ascending colon and focal inflammation in the fat between the left psoas and iliacus muscles.     Influenza A & B negative.    + C. Difficile - on oral vancomycin    Blood cultures remain negative.    04/11/17 - patient looking and feeling quite good.    C.dif explains his colitis and abdominal pain.       Continue oral vancomycin to complete Rx.    His polyarthralgia complaints have resolved.  Very likely immune mediated following prior treatment with Nivolumab and Pembrolizumab.      Continue oral prednisone taper from 60 mg daily @ 10 mg/weekly decreases.  Due to decrease to 40 p.o. Daily Thursday.      Continue rehabilitation with increasing activity as tolerates.  Continue incentive  spirometer when inactive, currently @ 9451-7578 cc.     04/17/17 - scheduled follow up with Dr Hoyos @ [facility] to discuss plan of care.     2)  Hypothyroid:    Secondary to Pembrolizumab    On replacement.    02/03/17 TSH - 9.88. Synthroid increased to 175 mcg daily.     02/20/17 TSH - 1.06    04/04/17 TSH - 0.15 (synthroid dose decreased to 150 mcg daily).    ECOG Performance Status @ 1        TT > 25 minutes face to face with > 50% counseling and care coordination.    Derrick Corado, CNP    ______________________________________________________________________________    Interim History:    The patient arrived ambulatory, talkative and in good spirits.  He looks and states he feels quite good.  His joint discomfort has resolved, he has not needed any oxycodone the past few days.  His abdominal pain has resolved.  He states he still watches what he eats, avoiding spicy foods.  He denies fever, chills, headache, visual or mentation disturbance, bladder issues.  His cough has pretty much resolved.  His bowels are forming up a bit with 2-3 BMs daily.  He continues on oral vancomycin, tolerating it without incidence.  He is not using or needing any supplemental oxygen.  He and his girl friend are hoping to be able to take a trip to the [facility] on [address] before resuming any therapies.    Review of Systems:    10 point review of systems negative there than what is listed in HPI.    Physical Exam:    Recent Vitals 4/7/2017   Height -   Weight -   BSA (m2) -   /74   Pulse 90   Temp 97.7   Temp src 1   SpO2 91       GENERAL:   Alert and oriented.  No acute distress.    HEENT:   Atraumatic and normocephalic.  CAPRICE, EOMI.  No pallor.  No icterus.  No mucosal lesions.    LYMPH NODES:  No palpable cervical, axillary or inguinal lymphadenopathy.    CHEST:   Quite clear.    Port-a-cath site looks good.    CVS:    S1 and S2 heard. Regular rate and rhythm.  No murmur, gallop or rub  heard.    ABDOMEN:   Soft.  Not tender.  Not distended.  No palpable hepatomegaly or splenomegaly.  No other mass palpable.    EXTREMITIES:  Warm.  No joint swelling or edema.    SKIN:    No noted rash or bruising.    Lab Results:    Reviewed with patient.    Recent Results (from the past 24 hour(s))   Phosphorus  (add-ons/treatment plan)   Result Value Ref Range    Phosphorus 2.6 2.5 - 4.5 mg/dL   Magnesium (add-ons/treatment plan)   Result Value Ref Range    Magnesium 1.4 (L) 1.8 - 2.6 mg/dL   Comprehensive Metabolic Panel (add-ons/treatment plan)   Result Value Ref Range    Sodium 141 136 - 145 mmol/L    Potassium 3.6 3.5 - 5.0 mmol/L    Chloride 105 98 - 107 mmol/L    CO2 27 22 - 31 mmol/L    Anion Gap, Calculation 9 5 - 18 mmol/L    Glucose 89 70 - 125 mg/dL    BUN 11 8 - 22 mg/dL    Creatinine 0.81 0.70 - 1.30 mg/dL    GFR MDRD Af Amer >60 >60 mL/min/1.73m2    GFR MDRD Non Af Amer >60 >60 mL/min/1.73m2    Bilirubin, Total 0.5 0.0 - 1.0 mg/dL    Calcium 8.0 (L) 8.5 - 10.5 mg/dL    Protein, Total 5.7 (L) 6.0 - 8.0 g/dL    Albumin 3.1 (L) 3.5 - 5.0 g/dL    Alkaline Phosphatase 44 (L) 45 - 120 U/L    AST 12 0 - 40 U/L    ALT 31 0 - 45 U/L   HM1 (CBC with Diff)   Result Value Ref Range    WBC 6.2 4.0 - 11.0 thou/uL    RBC 3.30 (L) 4.40 - 6.20 mill/uL    Hemoglobin 9.7 (L) 14.0 - 18.0 g/dL    Hematocrit 30.6 (L) 40.0 - 54.0 %    MCV 93 80 - 100 fL    MCH 29.4 27.0 - 34.0 pg    MCHC 31.7 (L) 32.0 - 36.0 g/dL    RDW 18.0 (H) 11.0 - 14.5 %    Platelets 68 (L) 140 - 440 thou/uL    MPV 11.5 8.5 - 12.5 fL    Neutrophils % 70 50 - 70 %    Lymphocytes % 23 20 - 40 %    Monocytes % 7 2 - 10 %    Eosinophils % 0 0 - 6 %    Basophils % 0 0 - 2 %    Neutrophils Absolute 4.2 2.0 - 7.7 thou/uL    Lymphocytes Absolute 1.4 0.8 - 4.4 thou/uL    Monocytes Absolute 0.4 0.0 - 0.9 thou/uL    Eosinophils Absolute 0.0 0.0 - 0.4 thou/uL    Basophils Absolute 0.0 0.0 - 0.2 thou/uL       Imaging    No new imaging

## 2021-06-11 NOTE — PROGRESS NOTES
[facility] Radiation Oncology Follow Up Note    Patient: Jack Ricketts  MRN: [id number]  Date of Service: 06/14/2017    Assessment / Impression     1. Non-small cell cancer of right lung  MR Brain With Without Contrast      ECOG Peformance Status  ECOG Performance Status: 1  Distress Assessment Score  Distress Assessment Score: 4  Interventions  Distress Assessment Intervention: Provider Notified  Body site: Brain    Plan:     Metastatic lung cancer: Progressive disease within the lungs.  He started back on chemotherapy with single agent Abraxane.    Adrenal insufficiency: After weaning off the steroids he was found to have adrenal insufficiency.  He is trying to get on a schedule to see an endocrinologist, but in the meantime is been placed on prednisone 2.5 mg daily.    Brain metastasis: a new punctate focus of enhancement within the left centrum semiovale and the posterior left frontal region with a similar sized area of T2 signal change. This could reflect a new intracranial metastasis.  Otherwise his central nervous system imaging is stable.  Follow-up in 6 weeks with repeat MRI of the brain.    Face to face time  20 minutes with > 50% spent on consultation, education and coordination of care.    Subjective:      HPI: Jack Ricketts is a 32 y.o. male with was diagnosed with a small cell lung cancer and was treated with standard chemotherapy and radiation receiving 4500 centigray in 15 fractions of 150 cGy given on a twice a day scheduled. His chemotherapy consisting of cisplatin. After a poor response to treatment he had a repeat biopsy which was positive for large cell carcinoma with neuroendocrine features. He underwent his 2nd course of concurrent chemoradiation therapy receiving a stereotactic approach to 50 Gy in 25 fractions using the CyberKnife radiosurgical technique with real-time fiducial guided tracking. This treatment was delivered from June 25, 2013 through August 1, 2013. He subsequently had  measurable disease on PET scan. He's been undergoing chemotherapy since that time. A PET/CT on 4/28/2014 showed some response to treatment but an incomplete response as some hypermetabolic activity was still present. Since his documented recurrence in February 2014 he has received 6 cycles of topotecan. After 2 months break he was started on topotecan and Avastin. He has finished 2 treatments of those. PET scan shows complete remission. He was only able to get day 1 of the topotecan and Avastin for his third cycle. In December 2014 started on maintenance Avastin. PET scan in March showed questionable recurrence in the liver. Current PET scan shows progression. Biopsy confirmed NSCLC with neuroendocrine differentiation. Started on Alimta and Avastin.       On August 19, 2015 he had an MRI of the brain which showed multiple enhancing lesions in both cerebral hemispheres consistent with metastatic lung cancer.       He was treated with Cyberknife radiosurgery. The following is a summary of treatment information.       Course: C1    Treatment Site     Energy     Dose/Fx (cGy)     #Fx     Dose Correction (cGy)     Total Dose (cGy)     Start Date     End Date     Elapsed Days       2 Lesions     6X     2,200     1 / 1     0     2,200     9/4/2015 9/4/2015     0       Rt Caudate     6X     2,200     1 / 1     0     2,200     9/8/2015 9/8/2015     0       Right Frontal     6X     2,200     1 / 1     0     2,200     9/9/2015 9/9/2015     0       Left Frontal     6X     2,200     1 / 1     0     2,200     9/8/2015 9/8/2015              Chief Complaint   Patient presents with     HE Cancer     f/u appt   .    Current Outpatient Prescriptions   Medication Sig Dispense Refill     acetaminophen (TYLENOL) 500 MG tablet Take 500-1,000 mg by mouth every 6 (six) hours as needed for pain.       albuterol (PROVENTIL HFA;VENTOLIN HFA) 90 mcg/actuation inhaler Inhale 2 puffs 4 (four) times a day. And every 4 hours as  needed for wheeze or cough (Patient taking differently: Inhale 2 puffs every 4 (four) hours as needed. And every 4 hours as needed for wheeze or cough) 18 g 1     calcium, as carbonate, (TUMS) 200 mg calcium (500 mg) chewable tablet Chew 2 tablets as needed for heartburn.       azithromycin (ZITHROMAX Z-ESMER) 250 MG tablet Take 2 tablets (500 mg) on  Day 1,  followed by 1 tablet (250 mg) once daily on Days 2 through 5. 6 tablet 0     benzonatate (TESSALON) 100 MG capsule Take 100 mg by mouth 3 (three) times a day as needed for cough.       furosemide (LASIX) 20 MG tablet One pill each morning for 7 days 7 tablet 11     ibuprofen (ADVIL,MOTRIN) 200 MG tablet Take 200-400 mg by mouth every 6 (six) hours as needed for pain.        inhalational spacing device Spcr Use as directed with inhaler 1 each 0     levothyroxine (SYNTHROID, LEVOTHROID) 150 MCG tablet Take 1 tablet (150 mcg total) by mouth Daily at 6:00 am. 30 tablet 0     MULTIVITAMIN ORAL Take 1 tablet by mouth daily.       omeprazole (PRILOSEC) 20 MG capsule Take 1 capsule (20 mg total) by mouth 2 (two) times a day. (Patient taking differently: Take 20 mg by mouth Daily before breakfast. ) 60 capsule 0     ondansetron (ZOFRAN-ODT) 4 MG disintegrating tablet Take 4 mg by mouth every 8 (eight) hours as needed for nausea.       oxyCODONE (ROXICODONE) 5 MG immediate release tablet Take 1-2 tablets (5-10 mg total) by mouth every 8 (eight) hours as needed. 30 tablet 0     predniSONE (DELTASONE) 2.5 MG tablet Take 3 tablets (7.5 mg total) by mouth daily. Please provide 120 tablets in case of acute illness, for stress dosing. 120 tablet 1     prochlorperazine (COMPAZINE) 10 MG tablet Take 1 tablet (10 mg total) by mouth every 6 (six) hours as needed (For breakthrough nausea/vomiting). 30 tablet 1     sulfamethoxazole-trimethoprim (BACTRIM DS) 800-160 mg per tablet Take 1 tablet by mouth 3 (three) times a week. 36 tablet 1     No current facility-administered medications  for this visit.      Facility-Administered Medications Ordered in Other Visits   Medication Dose Route Frequency Provider Last Rate Last Dose     heparin 100 unit/mL lockflush (PF) porcine 300-600 Units  300-600 Units Intravenous PRN Derrick Corado CNP   600 Units at 12/06/16 1502     heparin 100 unit/mL lockflush (PF) porcine 600 Units  600 Units Intravenous PRN Nas Hoyos MD   600 Units at 01/15/16 1445     sodium chloride 0.9 % flush 10 mL (NS)  10 mL Intravenous PRN Nas Hoyos MD   20 mL at 01/15/16 1445     sodium chloride 0.9 % flush 10 mL (NS)  10 mL Intravenous PRN Derrick Corado CNP   10 mL at 12/06/16 1505       The following portions of the patient's history were reviewed and updated as appropriate: allergies, current medications, past family history, past medical history, past social history, past surgical history and problem list.    Review of Systems    Constitutional  Constitutional (WDL): Exceptions to WDL  Fatigue: Fatigue relieved by rest  Fever: None  Neurosensory  Neurosensory (WDL): Exceptions to WDL  Peripheral Motor Neuropathy: None  Peripheral Sensory Neuropathy: None  Eye        Eye Disorder (WDL): All eye disorder elements are within defined limits  Ear     Cardiovascular  Cardiovascular (WDL): All cardiovascular elements are within defined limits  Pulmonary  Respiratory (WDL): Exceptions to WDL  Cough: Moderate symptoms, medical intervention indicated, limiting instrumental ADL  Dyspnea: Shortness of breath with minimal exertion, limiting instrumental ADL  Gastrointestinal  Gastrointestinal (WDL): Exceptions to WDL  Anorexia: Loss of appetite without alteration in eating habits  Genitourinary  Genitourinary (WDL): All genitourinary elements are within defined limits              Musculoskeletal              Musculoskeletal and Connetive Tissue Disorders (WDL): All Musculoskeletal and Connetive Tissue Disorder elements are within defined  "limits  Integumentary  Integumentary (WDL): All integumentary elements are within defined limits  Patient Coping  Patient Coping: Accepting  Pain              Currently in Pain: No/denies  Accompanied by  Accompanied by: Alone    Objective:     Physical Exam    Vitals:    06/14/17 1524   BP: 114/73   Pulse: (!) 104   Temp: 98.6  F (37  C)   TempSrc: Oral   SpO2: 93%   Weight: (!) 248 lb (112.5 kg)   Height: 6' 1\" (1.854 m)        General appearance: alert, appears stated age and cooperative  Head: Normocephalic, without obvious abnormality, atraumatic  Eyes: negative findings: lids and lashes normal, conjunctivae and sclerae normal and corneas clear  Neck: no adenopathy, no JVD, supple, symmetrical, trachea midline and thyroid not enlarged, symmetric, no tenderness/mass/nodules  Extremities: extremities normal, atraumatic, no cyanosis or edema  Skin: Skin color, texture, turgor normal. No rashes or lesions  Lymph nodes: Cervical, supraclavicular, and axillary nodes normal.  Neurologic: Alert and oriented X 3, normal strength and tone. Normal symmetric reflexes. Normal coordination and gait    Recent Labs:   Recent Results (from the past 168 hour(s))   Comprehensive Metabolic Panel   Result Value Ref Range    Sodium 136 136 - 145 mmol/L    Potassium 3.2 (L) 3.5 - 5.0 mmol/L    Chloride 96 (L) 98 - 107 mmol/L    CO2 28 22 - 31 mmol/L    Anion Gap, Calculation 12 5 - 18 mmol/L    Glucose 128 (H) 70 - 125 mg/dL    BUN 10 8 - 22 mg/dL    Creatinine 1.04 0.70 - 1.30 mg/dL    GFR MDRD Af Amer >60 >60 mL/min/1.73m2    GFR MDRD Non Af Amer >60 >60 mL/min/1.73m2    Bilirubin, Total 0.6 0.0 - 1.0 mg/dL    Calcium 9.1 8.5 - 10.5 mg/dL    Protein, Total 7.1 6.0 - 8.0 g/dL    Albumin 2.9 (L) 3.5 - 5.0 g/dL    Alkaline Phosphatase 62 45 - 120 U/L    AST 10 0 - 40 U/L    ALT 14 0 - 45 U/L   HM1 (CBC with Diff)   Result Value Ref Range    WBC 7.2 4.0 - 11.0 thou/uL    RBC 3.72 (L) 4.40 - 6.20 mill/uL    Hemoglobin 11.9 (L) 14.0 - " 18.0 g/dL    Hematocrit 34.3 (L) 40.0 - 54.0 %    MCV 92 80 - 100 fL    MCH 32.0 27.0 - 34.0 pg    MCHC 34.7 32.0 - 36.0 g/dL    RDW 14.9 (H) 11.0 - 14.5 %    Platelets 242 140 - 440 thou/uL    MPV 9.0 8.5 - 12.5 fL    Neutrophils % 74 (H) 50 - 70 %    Lymphocytes % 17 (L) 20 - 40 %    Monocytes % 9 2 - 10 %    Eosinophils % 0 0 - 6 %    Basophils % 0 0 - 2 %    Neutrophils Absolute 5.3 2.0 - 7.7 thou/uL    Lymphocytes Absolute 1.2 0.8 - 4.4 thou/uL    Monocytes Absolute 0.6 0.0 - 0.9 thou/uL    Eosinophils Absolute 0.0 0.0 - 0.4 thou/uL    Basophils Absolute 0.0 0.0 - 0.2 thou/uL   Magnesium   Result Value Ref Range    Magnesium 1.3 (L) 1.8 - 2.6 mg/dL       Imaging: Imaging results 30 days: Xr Chest Ap Portable    Result Date: 6/2/2017  XR CHEST AP PORTABLE 6/2/2017 3:48 PM INDICATION: pneumonia, septic shock COMPARISON: 04/02/2017. FINDINGS: There is subtle increased density at the right lung base with silhouetting of the right hemidiaphragm suggesting a right lower lobe infiltrate. There is spiculated density in the right perihilar region with associated fiducial markers which is not significantly changed consistent with history of known lung cancer. The left lung is clear. The heart size and pulmonary vascularity are normal. Infusion port catheter is in place with tip over the right atrium unchanged from the prior study.    Xr Chest Pa And Lateral    Result Date: 6/9/2017  XR CHEST PA AND LATERAL 6/9/2017 12:14 PM INDICATION: Cough COMPARISON: Chest radiographs 3/6/2017. PET/CT 6/7/2017. FINDINGS: Left IJ venous Port-A-Cath terminates in the cephalad right atrium. Known right hilar mass not significantly changed over the short interval from the prior PET/CT. Fiducial markers are present in the mass. Radiation pneumonitis of the perihilar  and lower right lung also stable. Small right pleural effusion tracks into the lateral pleural space and oblique fissure. Left lung clear. No pneumothorax. Stable  cardiomegaly. Normal pulmonary vascularity. This report was electronically interpreted by: Dr. Brayden Bartlett MD ON 06/09/2017 at 13:24    Mr Head With Without Contrast    Result Date: 6/8/2017  HEAD MRI WITHOUT AND WITH IV CONTRAST 6/7/2017 10:42 AM INDICATION: Neoplasm: head, metastatic, rx monitor or follow up. TECHNIQUE: Head MRI without and with intravenous contrast. CONTRAST: 10 ml Gadavist. COMPARISON: 03/14/2017, 03/11/2017. FINDINGS: Slight interval decrease in size of enhancing intra-axial lesion extending along the body of the right lateral ventricle both laterally and superiorly. It now measures 22 x 9 x 16 mm and previously measured 23 x 12 x 19 mm. Degree of associated  T2 signal prolongation has also decreased. A second intra-axial lesion in the right frontal lobe located more anteriorly and laterally demonstrates less defined enhancement than on the previous study but is similar in size measuring approximately 11 x 10 x 8 mm. The amount of associated nonenhancing T2 signal change has decreased. There is a new punctate focus of enhancement within the left central semioval and the posterior left frontal lobe best demonstrated on image 18 of series 11. Associated similar sized focus of T2 signal change is noted in this location. No restricted diffusion to suggest recent infarct. Ventricles and sulci are within normal limits. There is some hemosiderin associated with the 2 right frontal lobe lesions but otherwise no intracranial hemorrhage is identified. The pituitary and pineal regions are within normal limits. No focal aggressive appearing bone lesion is identified.     CONCLUSION: 1.  Previously demonstrated presumed metastasis along the right lateral ventricle has decreased in size and there is a decrease in amount of associated edema. 2.  A second right frontal lesion located more anteriorly and laterally demonstrates a diminished amount of enhancement and edema but is overall similar in size. 3.   There is a new punctate focus of enhancement within the left centrum semiovale and the posterior left frontal region with a similar sized area of T2 signal change. This could reflect a new intracranial metastasis and attention to this area on follow-up imaging is advised.    Cta Chest Pe Run    Result Date: 6/2/2017  CTA CHEST PE RUN 6/2/2017 5:29 PM INDICATION: Chest pain, acute, PE suspected. History of lung cancer. History of PE. TECHNIQUE: Helical acquisition through the chest was performed during the arterial phase of contrast enhancement using IV contrast. 2D and 3D reconstructions were performed by the CT technologist. Dose reduction techniques were used. IV CONTRAST: Iohexol (Omni) 100 mL COMPARISON: 4/3/2017 FINDINGS: ANGIOGRAM CHEST: Negative for pulmonary emboli. Negative for thoracic aortic dissection and aneurysms. LUNGS AND PLEURA: A right perihilar mass is again seen. There is opacification of the airway to the right middle lobe. There is narrowing of the airways to the right upper lobe and right lower lobe, which is unchanged. Multiple nodules in the right upper  lobe have increased in size. The largest is a 14 mm right upper lobe nodule (series 5 image 33). There is interlobular septal thickening in the right upper and right lower lobes. Reticular interstitial opacities in the left upper lobe have resolved. A 20 mm left lower lobe nodule previously measured 8 mm (series 5 image 32). There is collapse of the right middle lobe. Fiducial markers are noted. MEDIASTINUM: Normal size heart. The right or hilar mass surrounds the right hilar structures and airways. There is significant narrowing of the right lower lung pulmonary vein. Soft tissue in the subcarinal distribution is again noted. A left chest wall Port-A-Cath tip terminates in the right atrium. LIMITED UPPER ABDOMEN: Suspect hepatic steatosis. MUSCULOSKELETAL: Negative.     CONCLUSION: 1.  No PE identified. 2.  Right perihilar mass is again  noted. Nodules in the right lung have increased in size. A left lower lobe superior segment nodule has increased in size. Findings are concerning for increased malignancy. 3.  Septal thickening in the right upper lobe can be seen with post treatment change or venous or lymphatic obstruction. Lymphangitic carcinomatosis is not excluded. 4.  Left lung predominant pneumonitis has resolved.     Ct Abdomen Pelvis Without Oral With Iv Contrast    Result Date: 6/2/2017  CT ABDOMEN PELVIS WO ORAL W IV CONTRAST 6/2/2017 5:31 PM     INDICATION: Sepsis. Coughing with nausea and vomiting. TECHNIQUE: CT abdomen and pelvis. Multiplanar reformation images (MPR). Dose reduction techniques were used. IV CONTRAST: Iohexol (Omni) 100 mL COMPARISON: 4/3/2017 FINDINGS: LUNG BASES: Dictated separately. A right lung mass is partially visualized. ABDOMEN: Hepatic steatosis is suspected. Normal spleen. Normal gallbladder, adrenal glands, end kidneys. No hydronephrosis hydroureter. Normal stomach and small bowel. PELVIS: Mild circumferential wall thickening of the nondistended urinary bladder. Normal prostate gland and seminal vesicles. Normal colon. The appendix is not discretely visualized MUSCULOSKELETAL: Negative.     CONCLUSION: 1.  Circumferential wall thickening of the urinary bladder may be related to underdistention. Consider cystitis. 2.  Hepatic steatosis. 3.  Findings above the diaphragm are dictated separately.    Nm Pet Ct Skull To Mid Thigh    Result Date: 6/7/2017  PET FDG/CT 6/7/2017 12:49 PM INDICATION: Right lung cancer restaging, subsequent treatment strategy TECHNIQUE: Serum glucose level 85 mg/dL. One hour post left hand intravenous administration of 12.1 mCi F-18 FDG, PET imaging was performed from the skull base to the mid thighs utilizing attenuation correction with concurrent axial CT and PET/CT image fusion. Dose reduction techniques were used. COMPARISON: PET CT from 12/21/2016 and CT from 04/03/2017 FINDINGS:  HEAD AND NECK: Small area of low attenuation change in the right frontal lobe. Most of the brain is outside of the imaged volume. CHEST: Right perihilar mass has an SUV max of 10.8, previously 11.8. A prevascular mediastinal lymph node 6.9, previously 4.2. FDG avid pneumonitis previously present has improved. Tiny right pleural effusion. Left IJ Port-A-Cath tip in the right atrium.  Fiducial markers in the perihilar region of the right lung. ABDOMEN/PELVIS: A focus of FDG avid fat necrosis has developed in the left retroperitoneum between the iliacus and psoas muscles. FDG activity in the remainder of the abdomen and pelvis is normal. Pelvic phlebolith. MUSCULOSKELETAL: Injection artifact right antecubital fossa.     CONCLUSION: Stable disease    Us Abdomen Limited    Result Date: 6/3/2017  US ABDOMEN LIMITED 6/3/2017 9:59 AM INDICATION: recent RUQ pain, sepsis COMPARISON: None. FINDINGS: GALLBLADDER: Normal, without cholelithiasis. BILE DUCTS: No bile duct dilation. The common hepatic duct measures 3 mm. LIVER: Homogeneously hyperechoic. RIGHT KIDNEY: No hydronephrosis. PANCREAS: Not imaged in detail on this limited study. No incidental abnormalities. No ascites in the right upper quadrant.     CONCLUSION: 1.  No biliary ductal dilatation. Normal gallbladder. 2.  Hepatic parenchymal disease, likely hepatic steatosis.      Signed by: Lindsey Dacosta MD

## 2021-06-11 NOTE — PROGRESS NOTES
Pt here for first abraxane.  Port accessed easily and labs obtained.  Pt aware of treatment plan and side effects reviewed.  Pt has zofran and pain meds at home.  Upon completion port flushed and deaccessed and pt d/c ambulatory to lobby alone.  Pt encouraged to call with any questions or concerns

## 2021-06-11 NOTE — PROGRESS NOTES
[facility] Hematology and Oncology Progress Note    Patient: Jack Ricketts  MRN: [id number]  Date of Service: 6/13/2017           Reason for visit      1. Non-small cell cancer of right lung    2. Adrenal insufficiency         Assessment     1. Jack is very pleasant 32 y.o. gentleman with  non-small cell type of a lung cancer initially stage IIIB and later on developed liver metastases, treated with concurrent chemoradiation therapy with cisplatin and etoposide as a small cell lung cancer then given more radiation and weekly carboplatinum and Taxol.  He had recurrence documented February 2014 and has received 6 cycles of topotecan. In December 2014 started on maintenance Avastin.  PET scan in March 2015 showed questionable recurrence in the liver.  In August 2015 Biopsy confirmed NSCLC with neuroendocrine differentiation. Started on Alimta and Avastin.  He developed brain metastases September 2015 while on that.  Treatment changed to topotecan with Avastin.  In January 2016 PET scan showed progression again and at that time he got started on on pembrolizumab.  PET scan in August 2016 showed slight worsening in the right hilar area.  So in August 2016 he started on Opdivo.  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  He was actually on treatment break recovering from pneumonitis that he got admitted at [facility] with pulmonary embolism.  His CT scan showed progression of his lung cancer.  In December 2016 started on carboplatin and etoposide.  3 cycles.  Septic episodes after that needing intubation and pressors.  In the beginning of April 2017 came in with polyarthritis and on prednisone again.  The current PET scan shows stable amount of FDG uptake in the right hilar area.  2. Brain metastases which have been treated with CyberKnife and appeared to be responding well. 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  MRI for most  part looks good but still some suspicious area that would need a close monitoring.  3. Hypothyroidism due to Pembrolizumab. On replacement.  4. Pulmonary embolism was on Lovenox.  Currently on no anticoagulation.  5. Significant joint pains in the beginning of April 2017.  Better on prednisone.  6. C. difficile colitis diagnosed in April 2017.  7.   Adrenal insufficiency diagnosed in May 2017.    Plan     1. I reviewed his PET scan and brain MRI with him.  I think at this point he needs to start back on some type of palliative chemotherapy.  At this time I am going to start him on single agent Abraxane at 260 mg/m .  Due to his prior chemotherapy and his body habitus am actually going to go with the ideal body weight.  His total dose would be 570 mg IV given every 3 weeks.  This will be given as a single agent.  I gave him the printed information about it.  Will start in a week or so.  He will have a repeat PET scan in 2 months.  2. Continue on prednisone/renal replacement therapy.  3. Follow-up with MRI to check on his brain lesions in 2 months.  4.   Discussed with him that most likely is going to need chemotherapy again.  My choice of next chemotherapy would be single agent Abraxane.    Stage      Lung Cancer    Primary site: Lung (Right) small cell as well as non-small cell lung cancer adenocarcinoma    Clinical: Stage IIIA (T2b, N2, M0) signed by Nas Hoyos MD on 9/26/2014  1:17 PM    Summary: Stage IIIA (T2b, N2, M0)      History     Jack Ricketts is a very pleasant 32 y.o. old male with a history of what was thought to be small cell lung cancer, limited stage, in 09/2012 located in the mediastinum measuring 10 cm.  Biopsy confirmed CK7 positive, synaptophysin positive chromogranin expressing cells.  Initial treatment involved chemoradiation therapy with cisplatin and -16.  Post treatment PET scan showed only a minimal response.  Rebiopsy revealed a component of nonsmall cell lung cancer as well,  EGFR and ALK-1 mutation negative.  He was referred to Dr. Jose Amezcua [facility] who recommended Taxotere chemotherapy.  The patient opted for radiation therapy (CyberKnife) with radiosensitizing weekly carboplatin and Taxol chemotherapies initiated in June 2013.  He tolerated treatment quite well.  He had complete response to the treatment.       February, 2014, PET scan (compared to prior PET scan in November, 2013) revealed subcarinal node hypermetabolism compatible with active metastasis. This was also biopsied and proven to be malignant and suggestive of small cell type of cancer.  We started him on palliative topotecan chemotherapy day 1 through 5 every 3 weeks.  Cycle one was initiated on 03/04/2014 and he finished 6 cycles of that.  His PET scan after 3 cycles showed minimal response then after 6 cycles his PET scan showed continued but incomplete response.  He did need some transfusion after the fifth cycle.    After couple months break was resumed his treatment with topotecan and Avastin.  He had 3 cycles .  His PET scan after the 2nd cycle had shown complete response.  He started on that his third cycle but could not completed because of respiratory infections.  After that in December 2014 he started on Avastin as a single agent for maintenance.  He has been on that until March 2015 when PET scan that actually showed a questionable uptake in his liver as well as the subcarinal lymph node.  He got a CT-guided biopsy of the liver lesion which was non diagnostic. At the time of the biopsy they had trouble locating the lesion.  Then in August 2015 the liver lesions got bigger and we biopsied again and came back positive for neuroendocrine carcinoma of lung origin.  He was started on treatment with Alimta and Avastin.  However he developed brain metastases in September.  Treated with CyberKnife therapy.  We change his treatment to topotecan and Avastin.  He has had couple cycles of that.  He  became quite anemic and needed transfusion last week.  His brain MRI in the beginning of January 2016 showed improvement.  However his PET scan in January 2016 showed worsening in the liver lesion.    We tested his tumor for PDL-1 mutation and he did have favorable mutation for which pembrolizumab would be a targeted agent. He started on that in the end of January 2016.  He was noted to be hypothyroid after 3 cycles. Started on synthroid. He did notice depression which is better after synthroid. In August 2016 his PET scan showed progression. He was started on Opdivo. He did fine initially but then later on in October he started to have pneumonitis-type of picture.  It was felt to be secondary to Opdivo.    He was put on some steroid Opdivo was stopped.    He was admitted at [facility] second week of December 2016 with shortness of breath.  He was found to have pulmonary embolism as well as his CT scan showed progression of his lung cancer.  We started him on palliative chemotherapy with carboplatin and etoposide in December and he got 3 cycles of it.  He needed Neulasta.  He had a rough time after the third cycle.  He was admitted with pneumonia and other and problems.  In fact he needed to be intubated and was on pressors.  He has recovered from that.  He has been on observation since late February.    He actually went traveling to [place] to some national grant.  He did do some 1-2 miles of hiking while he was there.  He came back later part of May.  He actually was again admitted at [facility] with fever and chills.  Culture did not grow anything but he was told that he might have some viral type of infection.  He was given some antibiotics.  Feels better now.  Comes in today after a PET scan.  He also had a brain MRI done.    Past Medical History     Bronchitis, lung cancer, renal insufficiency    Family History   Problem Relation Age of Onset     Cancer Mother      Breast cancer Mother       No Medical Problems Father      No Medical Problems Brother      No Medical Problems Brother      Social History   Substance Use Topics     Smoking status: Never Smoker     Smokeless tobacco: Never Used     Alcohol use Yes      Comment: Very rare       Stage      Lung Cancer    Primary site: Lung (Right)    Clinical: Stage IIIA (T2b, N2, M1b) signed by Nas Hoyos MD on 8/20/2015 10:08 AM    Summary: Stage IIIA (T2b, N2, M1b)     Treatment History     1. September 2012 cisplatin and -16 along with concurrent radiation therapy for 2 cycles.  2. Weekly carboplatin and Taxol along with radiation therapy in June 2013.  3. March 2014 topotecan day 1 through 5 every 3 weeks for 6 cycles  4. September 2014 topotecan and Avastin ×3 cycles.  Third cycle was cut Short.  5. December 2014 Avastin 15 mg/kg every 3 weeks maintenance treatment.  6. August 2015 Avastin and Alimta.  7. Developed brain metastases in September 2015 treated with CyberKnife.  8. November 2015 started on topotecan with Avastin.  PET scan in January 2016 showed progression.  9. February 2016 started on pembrolizumab.  10. Opdivo started in August 15, 2016.  11. Carboplatin and etoposide 12/05/16 - 02/20/17 completed 3 cycles     Review of Systems   Constitutional  Constitutional (WDL): Exceptions to WDL  Fatigue: Fatigue relieved by rest  Neurosensory  Neurosensory (WDL): All neurosensory elements are within defined limits  Cardiovascular  Cardiovascular (WDL): Exceptions to WDL  Pulmonary  Cough: Moderate symptoms, medical intervention indicated, limiting instrumental ADL (phlem is yellow)  Dyspnea: Shortness of breath with minimal exertion, limiting instrumental ADL    Gastrointestinal  Gastrointestinal (WDL): Exceptions to WDL  Anorexia: Loss of appetite without alteration in eating habits  Genitourinary  Genitourinary (WDL): All genitourinary elements are within defined limits  Integumentary  Integumentary (WDL): All integumentary elements are  within defined limits  Patient Coping  Patient Coping: Accepting  ECOG Performance   1  Pain Status  Currently in Pain: Yes  Accompanied by  Accompanied by: Alone      Vital Signs     Vitals:    06/12/17 1544   BP: 107/69   Pulse: (!) 120   Temp: 99  F (37.2  C)   SpO2: 95%       Physical Exam     Constitutional: Oriented to person, place, and time and appears well-developed.   HEENT: Minimal cushingoid appearance. Normocephalic and atraumatic.  Eyes: Conjunctivae and EOM are normal. Pupils are equal, round, and reactive to light. No discharge.  No scleral icterus. Nose normal. Mouth/Throat: Oropharynx is clear and moist. No oropharyngeal exudate.    NECK: Normal range of motion. Neck supple. No JVD present. No tracheal deviation present.  Scar from surgery and the neck is present.   CARDIOVASCULAR: Elevated heart rate, regular rhythm and intact distal pulses.  Exam reveals no gallop and no friction rub.  Systolic murmur present.  PULMONARY: Effort normal and breath sounds normal. No respiratory distress. No Wheezing but definitely has ronchi and coarse crepitations.  ABDOMEN: Soft. Bowel sounds are normal. No distension and no mass.  There is no tenderness. There is no rebound and no guarding. No HSM.  MUSCULOSKELETAL: Normal range of motion. No edema and no tenderness. Mild kyphosis, no tenderness.  LYMPH NODES: Has no cervical, supraclavicular, axillary and groin adenopathy.   NEUROLOGICAL: Alert and oriented to person, place, and time. No cranial nerve deficit.  Normal muscle tone. Coordination normal.   GENITOURINARY: Deferred exam.  SKIN: Skin is warm and dry. Rash on back of the neck . No erythema. No pallor.   EXTREMITIES: No cyanosis, no clubbing, no edema. No Deformity.  PSYCHIATRIC: He is somewhat better being on steroids.      Lab Results     Results for orders placed or performed in visit on 06/12/17   Magnesium   Result Value Ref Range    Magnesium 1.3 (L) 1.8 - 2.6 mg/dL    reviewed his labs from his  recent hospitalization.      Distress Assessment       Distress Assessment Score: 5 (scan results)     Imaging Results       Xr Chest Ap Portable    Result Date: 6/2/2017  XR CHEST AP PORTABLE 6/2/2017 3:48 PM INDICATION: pneumonia, septic shock COMPARISON: 04/02/2017. FINDINGS: There is subtle increased density at the right lung base with silhouetting of the right hemidiaphragm suggesting a right lower lobe infiltrate. There is spiculated density in the right perihilar region with associated fiducial markers which is not significantly changed consistent with history of known lung cancer. The left lung is clear. The heart size and pulmonary vascularity are normal. Infusion port catheter is in place with tip over the right atrium unchanged from the prior study.    Xr Chest Pa And Lateral    Result Date: 6/9/2017  XR CHEST PA AND LATERAL 6/9/2017 12:14 PM INDICATION: Cough COMPARISON: Chest radiographs 3/6/2017. PET/CT 6/7/2017. FINDINGS: Left IJ venous Port-A-Cath terminates in the cephalad right atrium. Known right hilar mass not significantly changed over the short interval from the prior PET/CT. Fiducial markers are present in the mass. Radiation pneumonitis of the perihilar  and lower right lung also stable. Small right pleural effusion tracks into the lateral pleural space and oblique fissure. Left lung clear. No pneumothorax. Stable cardiomegaly. Normal pulmonary vascularity. This report was electronically interpreted by: Dr. Brayden Bartlett MD ON 06/09/2017 at 13:24    Mr Head With Without Contrast    Result Date: 6/8/2017  HEAD MRI WITHOUT AND WITH IV CONTRAST 6/7/2017 10:42 AM INDICATION: Neoplasm: head, metastatic, rx monitor or follow up. TECHNIQUE: Head MRI without and with intravenous contrast. CONTRAST: 10 ml Gadavist. COMPARISON: 03/14/2017, 03/11/2017. FINDINGS: Slight interval decrease in size of enhancing intra-axial lesion extending along the body of the right lateral ventricle both laterally and  superiorly. It now measures 22 x 9 x 16 mm and previously measured 23 x 12 x 19 mm. Degree of associated  T2 signal prolongation has also decreased. A second intra-axial lesion in the right frontal lobe located more anteriorly and laterally demonstrates less defined enhancement than on the previous study but is similar in size measuring approximately 11 x 10 x 8 mm. The amount of associated nonenhancing T2 signal change has decreased. There is a new punctate focus of enhancement within the left central semioval and the posterior left frontal lobe best demonstrated on image 18 of series 11. Associated similar sized focus of T2 signal change is noted in this location. No restricted diffusion to suggest recent infarct. Ventricles and sulci are within normal limits. There is some hemosiderin associated with the 2 right frontal lobe lesions but otherwise no intracranial hemorrhage is identified. The pituitary and pineal regions are within normal limits. No focal aggressive appearing bone lesion is identified.     CONCLUSION: 1.  Previously demonstrated presumed metastasis along the right lateral ventricle has decreased in size and there is a decrease in amount of associated edema. 2.  A second right frontal lesion located more anteriorly and laterally demonstrates a diminished amount of enhancement and edema but is overall similar in size. 3.  There is a new punctate focus of enhancement within the left centrum semiovale and the posterior left frontal region with a similar sized area of T2 signal change. This could reflect a new intracranial metastasis and attention to this area on follow-up imaging is advised.    Cta Chest Pe Run    Result Date: 6/2/2017  CTA CHEST PE RUN 6/2/2017 5:29 PM INDICATION: Chest pain, acute, PE suspected. History of lung cancer. History of PE. TECHNIQUE: Helical acquisition through the chest was performed during the arterial phase of contrast enhancement using IV contrast. 2D and 3D  reconstructions were performed by the CT technologist. Dose reduction techniques were used. IV CONTRAST: Iohexol (Omni) 100 mL COMPARISON: 4/3/2017 FINDINGS: ANGIOGRAM CHEST: Negative for pulmonary emboli. Negative for thoracic aortic dissection and aneurysms. LUNGS AND PLEURA: A right perihilar mass is again seen. There is opacification of the airway to the right middle lobe. There is narrowing of the airways to the right upper lobe and right lower lobe, which is unchanged. Multiple nodules in the right upper  lobe have increased in size. The largest is a 14 mm right upper lobe nodule (series 5 image 33). There is interlobular septal thickening in the right upper and right lower lobes. Reticular interstitial opacities in the left upper lobe have resolved. A 20 mm left lower lobe nodule previously measured 8 mm (series 5 image 32). There is collapse of the right middle lobe. Fiducial markers are noted. MEDIASTINUM: Normal size heart. The right or hilar mass surrounds the right hilar structures and airways. There is significant narrowing of the right lower lung pulmonary vein. Soft tissue in the subcarinal distribution is again noted. A left chest wall Port-A-Cath tip terminates in the right atrium. LIMITED UPPER ABDOMEN: Suspect hepatic steatosis. MUSCULOSKELETAL: Negative.     CONCLUSION: 1.  No PE identified. 2.  Right perihilar mass is again noted. Nodules in the right lung have increased in size. A left lower lobe superior segment nodule has increased in size. Findings are concerning for increased malignancy. 3.  Septal thickening in the right upper lobe can be seen with post treatment change or venous or lymphatic obstruction. Lymphangitic carcinomatosis is not excluded. 4.  Left lung predominant pneumonitis has resolved.     Ct Abdomen Pelvis Without Oral With Iv Contrast    Result Date: 6/2/2017  CT ABDOMEN PELVIS WO ORAL W IV CONTRAST 6/2/2017 5:31 PM     INDICATION: Sepsis. Coughing with nausea and  vomiting. TECHNIQUE: CT abdomen and pelvis. Multiplanar reformation images (MPR). Dose reduction techniques were used. IV CONTRAST: Iohexol (Omni) 100 mL COMPARISON: 4/3/2017 FINDINGS: LUNG BASES: Dictated separately. A right lung mass is partially visualized. ABDOMEN: Hepatic steatosis is suspected. Normal spleen. Normal gallbladder, adrenal glands, end kidneys. No hydronephrosis hydroureter. Normal stomach and small bowel. PELVIS: Mild circumferential wall thickening of the nondistended urinary bladder. Normal prostate gland and seminal vesicles. Normal colon. The appendix is not discretely visualized MUSCULOSKELETAL: Negative.     CONCLUSION: 1.  Circumferential wall thickening of the urinary bladder may be related to underdistention. Consider cystitis. 2.  Hepatic steatosis. 3.  Findings above the diaphragm are dictated separately.    Nm Pet Ct Skull To Mid Thigh    Result Date: 6/7/2017  PET FDG/CT 6/7/2017 12:49 PM INDICATION: Right lung cancer restaging, subsequent treatment strategy TECHNIQUE: Serum glucose level 85 mg/dL. One hour post left hand intravenous administration of 12.1 mCi F-18 FDG, PET imaging was performed from the skull base to the mid thighs utilizing attenuation correction with concurrent axial CT and PET/CT image fusion. Dose reduction techniques were used. COMPARISON: PET CT from 12/21/2016 and CT from 04/03/2017 FINDINGS: HEAD AND NECK: Small area of low attenuation change in the right frontal lobe. Most of the brain is outside of the imaged volume. CHEST: Right perihilar mass has an SUV max of 10.8, previously 11.8. A prevascular mediastinal lymph node 6.9, previously 4.2. FDG avid pneumonitis previously present has improved. Tiny right pleural effusion. Left IJ Port-A-Cath tip in the right atrium.  Fiducial markers in the perihilar region of the right lung. ABDOMEN/PELVIS: A focus of FDG avid fat necrosis has developed in the left retroperitoneum between the iliacus and psoas muscles.  FDG activity in the remainder of the abdomen and pelvis is normal. Pelvic phlebolith. MUSCULOSKELETAL: Injection artifact right antecubital fossa.     CONCLUSION: Stable disease    Us Abdomen Limited    Result Date: 6/3/2017  US ABDOMEN LIMITED 6/3/2017 9:59 AM INDICATION: recent RUQ pain, sepsis COMPARISON: None. FINDINGS: GALLBLADDER: Normal, without cholelithiasis. BILE DUCTS: No bile duct dilation. The common hepatic duct measures 3 mm. LIVER: Homogeneously hyperechoic. RIGHT KIDNEY: No hydronephrosis. PANCREAS: Not imaged in detail on this limited study. No incidental abnormalities. No ascites in the right upper quadrant.     CONCLUSION: 1.  No biliary ductal dilatation. Normal gallbladder. 2.  Hepatic parenchymal disease, likely hepatic steatosis.      Signed by: Nas Hoyos MD

## 2021-06-11 NOTE — ANESTHESIA PREPROCEDURE EVALUATION
Anesthesia Evaluation      Patient summary reviewed   No history of anesthetic complications     Airway   Mallampati: II  Neck ROM: full   Pulmonary - normal exam    breath sounds clear to auscultation  (+) shortness of breath,     ROS comment: Non-small cell lung cancer, wears nasal cannula oxygen at home when needed (if short of breath)                         Cardiovascular - normal exam  (+) hypertension, ,     ECG reviewed  Rhythm: regular  Rate: normal,         Neuro/Psych - negative ROS     Endo/Other    (+) hypothyroidism,      Comments: Adrenal insufficiency    GI/Hepatic/Renal    (+) GERD well controlled,       Comments: Acute cholecystitis          Dental - normal exam                        Anesthesia Plan  Planned anesthetic: general endotracheal    ASA 3 - emergent   Induction: intravenous   Anesthetic plan and risks discussed with: patient    Post-op plan: routine recovery

## 2021-06-11 NOTE — PROGRESS NOTES
[facility] Hematology and Oncology Progress Note    Patient: Jack Ricketts  MRN: [id number]  Date of Service: 7/10/2017           Reason for visit      1. Non-small cell lung cancer, unspecified laterality         Assessment     1. Jack is very pleasant 32 y.o. gentleman with  non-small cell type of a lung cancer initially stage IIIB and later on developed liver metastases, treated with concurrent chemoradiation therapy with cisplatin and etoposide as a small cell lung cancer then given more radiation and weekly carboplatinum and Taxol.  He had recurrence documented February 2014 and has received 6 cycles of topotecan. In December 2014 started on maintenance Avastin.  PET scan in March 2015 showed questionable recurrence in the liver.  In August 2015 Biopsy confirmed NSCLC with neuroendocrine differentiation. Started on Alimta and Avastin.  He developed brain metastases September 2015 while on that.  Treatment changed to topotecan with Avastin.  In January 2016 PET scan showed progression again and at that time he got started on on pembrolizumab.  PET scan in August 2016 showed slight worsening in the right hilar area.  So in August 2016 he started on Opdivo.  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  He was actually on treatment break recovering from pneumonitis that he got admitted at [facility] with pulmonary embolism.  His CT scan showed progression of his lung cancer.  In December 2016 started on carboplatin and etoposide.  3 cycles.  Septic episodes after that needing intubation and pressors.  In the beginning of April 2017 came in with polyarthritis and on prednisone again.  The current PET scan shows stable amount of FDG uptake in the right hilar area. Now on ABraxane. Tolerated it well. Had acute cholecystitis and needed his gall bladder removed.   2. Brain metastases which have been treated with CyberKnife and appeared to be  responding well. 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  MRI for most part looks good but still some suspicious area that would need a close monitoring.  3. Hypothyroidism due to Pembrolizumab. On replacement.  4. Pulmonary embolism was on Lovenox.  Currently on no anticoagulation.  5. Significant joint pains in the beginning of April 2017.  Better on prednisone.  6. C. difficile colitis diagnosed in April 2017.  7.   Adrenal insufficiency diagnosed in May 2017.    Plan     1. Hold chemotherapy today. He will come and get it next week. I would see him in 3 weeks.  2. Continue on prednisone/renal replacement therapy.  3. Follow-up with MRI to check on his brain lesions in 2 months.  4.   Continue with healthy diet.    Stage      Lung Cancer    Primary site: Lung (Right) small cell as well as non-small cell lung cancer adenocarcinoma    Clinical: Stage IIIA (T2b, N2, M0) signed by Nas Hoyos MD on 9/26/2014  1:17 PM    Summary: Stage IIIA (T2b, N2, M0)      History     Jack Ricketts is a very pleasant 32 y.o. old male with a history of what was thought to be small cell lung cancer, limited stage, in 09/2012 located in the mediastinum measuring 10 cm.  Biopsy confirmed CK7 positive, synaptophysin positive chromogranin expressing cells.  Initial treatment involved chemoradiation therapy with cisplatin and -16.  Post treatment PET scan showed only a minimal response.  Rebiopsy revealed a component of nonsmall cell lung cancer as well, EGFR and ALK-1 mutation negative.  He was referred to Dr. Jose Amezcua [facility] who recommended Taxotere chemotherapy.  The patient opted for radiation therapy (CyberKnife) with radiosensitizing weekly carboplatin and Taxol chemotherapies initiated in June 2013.  He tolerated treatment quite well.  He had complete response to the treatment.       February, 2014, PET scan (compared to prior PET scan in November, 2013) revealed subcarinal node  hypermetabolism compatible with active metastasis. This was also biopsied and proven to be malignant and suggestive of small cell type of cancer.  We started him on palliative topotecan chemotherapy day 1 through 5 every 3 weeks.  Cycle one was initiated on 03/04/2014 and he finished 6 cycles of that.  His PET scan after 3 cycles showed minimal response then after 6 cycles his PET scan showed continued but incomplete response.  He did need some transfusion after the fifth cycle.    After couple months break was resumed his treatment with topotecan and Avastin.  He had 3 cycles .  His PET scan after the 2nd cycle had shown complete response.  He started on that his third cycle but could not completed because of respiratory infections.  After that in December 2014 he started on Avastin as a single agent for maintenance.  He has been on that until March 2015 when PET scan that actually showed a questionable uptake in his liver as well as the subcarinal lymph node.  He got a CT-guided biopsy of the liver lesion which was non diagnostic. At the time of the biopsy they had trouble locating the lesion.  Then in August 2015 the liver lesions got bigger and we biopsied again and came back positive for neuroendocrine carcinoma of lung origin.  He was started on treatment with Alimta and Avastin.  However he developed brain metastases in September.  Treated with CyberKnife therapy.  We change his treatment to topotecan and Avastin.  He has had couple cycles of that.  He became quite anemic and needed transfusion last week.  His brain MRI in the beginning of January 2016 showed improvement.  However his PET scan in January 2016 showed worsening in the liver lesion.    We tested his tumor for PDL-1 mutation and he did have favorable mutation for which pembrolizumab would be a targeted agent. He started on that in the end of January 2016.  He was noted to be hypothyroid after 3 cycles. Started on synthroid. He did notice  depression which is better after synthroid. In August 2016 his PET scan showed progression. He was started on Opdivo. He did fine initially but then later on in October he started to have pneumonitis-type of picture.  It was felt to be secondary to Opdivo.    He was put on some steroid Opdivo was stopped.    He was admitted at [facility] second week of December 2016 with shortness of breath.  He was found to have pulmonary embolism as well as his CT scan showed progression of his lung cancer.  We started him on palliative chemotherapy with carboplatin and etoposide in December and he got 3 cycles of it.  He needed Neulasta.  He had a rough time after the third cycle.  He was admitted with pneumonia and other and problems.  In fact he needed to be intubated and was on pressors.  He has recovered from that.  He has been on observation since late February 2017.    Due to persistent disease on PET scan, started on Abraxane in June. He was admitted with RUQ pain and found to have acute cholecystitis and on July 4th he had his gall bladder removed. Feels better now.    Past Medical History     Bronchitis, lung cancer, renal insufficiency    Family History   Problem Relation Age of Onset     Cancer Mother      Breast cancer Mother      No Medical Problems Father      No Medical Problems Brother      No Medical Problems Brother      Social History   Substance Use Topics     Smoking status: Never Smoker     Smokeless tobacco: Never Used     Alcohol use Yes      Comment: Very rare       Stage      Lung Cancer    Primary site: Lung (Right)    Clinical: Stage IIIA (T2b, N2, M1b) signed by Nas Hoyos MD on 8/20/2015 10:08 AM    Summary: Stage IIIA (T2b, N2, M1b)     Treatment History     1. September 2012 cisplatin and -16 along with concurrent radiation therapy for 2 cycles.  2. Weekly carboplatin and Taxol along with radiation therapy in June 2013.  3. March 2014 topotecan day 1 through 5 every 3 weeks for 6  cycles  4. September 2014 topotecan and Avastin ×3 cycles.  Third cycle was cut Short.  5. December 2014 Avastin 15 mg/kg every 3 weeks maintenance treatment.  6. August 2015 Avastin and Alimta.  7. Developed brain metastases in September 2015 treated with CyberKnife.  8. November 2015 started on topotecan with Avastin.  PET scan in January 2016 showed progression.  9. February 2016 started on pembrolizumab.  10. Opdivo started in August 15, 2016.  11. Carboplatin and etoposide 12/05/16 - 02/20/17 completed 3 cycles     Review of Systems   Constitutional  Constitutional (WDL): Exceptions to WDL  Fatigue: Fatigue not relieved by rest - Limiting instrumental ADL  Weight Loss: to <10% from baseline, intervention not indicated (down 3lbs since 6/14)  Neurosensory  Neurosensory (WDL): Exceptions to WDL  Peripheral Sensory Neuropathy: Asymptomatic, loss of deep tendon reflexes or paresthesia (N/T in fingertips)  Cardiovascular  Cardiovascular (WDL): Exceptions to WDL ()  Pulmonary  Cough: Moderate symptoms, medical intervention indicated, limiting instrumental ADL  Dyspnea: Shortness of breath with minimal exertion, limiting instrumental ADL    Gastrointestinal  Gastrointestinal (WDL): Exceptions to WDL  Anorexia: Loss of appetite without alteration in eating habits  Diarrhea: Increase of <4 stools per day over baseline, mild increase in ostomy output compared to baseline  Genitourinary  Genitourinary (WDL): All genitourinary elements are within defined limits  Integumentary  Integumentary (WDL): Exceptions to WDL (healing Gallblader surgery)  Patient Coping  Patient Coping: Accepting  ECOG Performance   1  Pain Status  Currently in Pain: Yes  Accompanied by  Accompanied by: Alone      Vital Signs     Vitals:    07/10/17 1343   BP: 128/90   Pulse: (!) 127   Temp: 98.7  F (37.1  C)   SpO2: 97%       Physical Exam     Constitutional: Oriented to person, place, and time and appears well-developed.   HEENT:   Normocephalic and atraumatic.  Eyes: Conjunctivae and EOM are normal. Pupils are equal, round, and reactive to light. No discharge.  No scleral icterus. Nose normal. Mouth/Throat: Oropharynx is clear and moist. No oropharyngeal exudate.    NECK: Normal range of motion. Neck supple. No JVD present. No tracheal deviation present.  Scar from surgery and the neck is present.   CARDIOVASCULAR: Elevated heart rate, regular rhythm and intact distal pulses.  Exam reveals no gallop and no friction rub.  Systolic murmur present.  PULMONARY: Effort normal and breath sounds normal. No respiratory distress. No Wheezing but definitely has ronchi and coarse crepitations.  ABDOMEN: Several ecchymosis areas. Incision looks good. Soft. Bowel sounds are normal. No distension and no mass. Minimal tenderness. There is no rebound and no guarding. No HSM.  MUSCULOSKELETAL: Normal range of motion. No edema and no tenderness. Mild kyphosis, no tenderness.  LYMPH NODES: Has no cervical, supraclavicular, axillary and groin adenopathy.   NEUROLOGICAL: Alert and oriented to person, place, and time. No cranial nerve deficit.  Normal muscle tone. Coordination normal.   GENITOURINARY: Deferred exam.  SKIN: Skin is warm and dry. Rash on back of the neck . No erythema. No pallor.   EXTREMITIES: No cyanosis, no clubbing, no edema. No Deformity.  PSYCHIATRIC: He is somewhat better being on steroids.      Lab Results     Results for orders placed or performed in visit on 07/10/17   Comprehensive Metabolic Panel   Result Value Ref Range    Sodium 139 136 - 145 mmol/L    Potassium 3.4 (L) 3.5 - 5.0 mmol/L    Chloride 100 98 - 107 mmol/L    CO2 26 22 - 31 mmol/L    Anion Gap, Calculation 13 5 - 18 mmol/L    Glucose 164 (H) 70 - 125 mg/dL    BUN 11 8 - 22 mg/dL    Creatinine 1.00 0.70 - 1.30 mg/dL    GFR MDRD Af Amer >60 >60 mL/min/1.73m2    GFR MDRD Non Af Amer >60 >60 mL/min/1.73m2    Bilirubin, Total 1.1 (H) 0.0 - 1.0 mg/dL    Calcium 9.4 8.5 - 10.5  mg/dL    Protein, Total 7.2 6.0 - 8.0 g/dL    Albumin 3.4 (L) 3.5 - 5.0 g/dL    Alkaline Phosphatase 192 (H) 45 - 120 U/L    AST 26 0 - 40 U/L     (H) 0 - 45 U/L   HM1 (CBC with Diff)   Result Value Ref Range    WBC 8.7 4.0 - 11.0 thou/uL    RBC 3.78 (L) 4.40 - 6.20 mill/uL    Hemoglobin 11.6 (L) 14.0 - 18.0 g/dL    Hematocrit 35.7 (L) 40.0 - 54.0 %    MCV 94 80 - 100 fL    MCH 30.7 27.0 - 34.0 pg    MCHC 32.5 32.0 - 36.0 g/dL    RDW 16.8 (H) 11.0 - 14.5 %    Platelets 249 140 - 440 thou/uL    MPV 8.8 8.5 - 12.5 fL    Neutrophils % 81 (H) 50 - 70 %    Lymphocytes % 15 (L) 20 - 40 %    Monocytes % 4 2 - 10 %    Eosinophils % 0 0 - 6 %    Basophils % 0 0 - 2 %    Neutrophils Absolute 7.0 2.0 - 7.7 thou/uL    Lymphocytes Absolute 1.3 0.8 - 4.4 thou/uL    Monocytes Absolute 0.4 0.0 - 0.9 thou/uL    Eosinophils Absolute 0.0 0.0 - 0.4 thou/uL    Basophils Absolute 0.0 0.0 - 0.2 thou/uL    reviewed his labs from his recent hospitalization.      Distress Assessment       Distress Assessment Score: 3     Imaging Results       Cta Chest Pe Run    Result Date: 7/4/2017  CTA CHEST PE RUN 7/4/2017 6:00 AM INDICATION: Upper abdominal pain, tachycardia. History of lung cancer, shortness of breath. TECHNIQUE: Helical acquisition through the chest was performed during the arterial phase of contrast enhancement using IV contrast. 2D and 3D reconstructions were performed by the CT technologist. Dose reduction techniques were used. IV CONTRAST: Iohexol (Omni) 100 mL. COMPARISON: 6/28/2017. FINDINGS: ANGIOGRAM CHEST: No pulmonary embolus, aortic dissection or aneurysm, as previously seen, there is focal narrowing of the right pulmonary arteries and branch vessels in the region of the right hilum related to extrinsic compression by mass. No aortic dissection or aneurysm. LUNGS AND PLEURA: Small right pleural effusion. No pneumothorax. Again seen is a right hilar mass with narrowing of the right lower lobar bronchus and segmental  bronchi of the right middle lobe unchanged. MEDIASTINUM: Stable enlarged left paratracheal lymph node. Right hilar and lowest paratracheal mediastinal lymphadenopathy. Left-sided Port-A-Cath with tip seen terminating in right atrium. Mildly enlarged heart. Small pericardial effusion. LIMITED UPPER ABDOMEN: Please, see dedicated CT abdomen performed same day. MUSCULOSKELETAL: Negative.     CONCLUSION: 1.  No pulmonary embolus, aortic dissection, or aneurysm. Persistent narrowing of right hilar, pulmonary arterial and venous structures due to presence of right hilar mass lesion. 2.  Stable right hilar mass with right hilar lymphadenopathy and mediastinal lymphadenopathy, grossly unchanged. 3.  Stable small right pleural effusion.    Mr Mrcp With Without Contrast    Addendum Date: 7/5/2017    Addendum: Recent CT PET scan 06/07/2017 showed no evidence for abnormal activity at the liver dome. 6 month MRI follow-up recommended.    Result Date: 7/5/2017  MR MRCP W WO CONTRAST 7/4/2017 8:36 PM INDICATION: Abnormal liver function tests TECHNIQUE: Routine MR liver with thin-section axial and coronal MRCP sequences. 2D and 3D reconstruction performed by MR technologist including MIP reconstruction and slab cholangiograms. IV CONTRAST: 10 ml Eovist COMPARISON: CT 07/04/2017 and abdominal ultrasound exam from [facility] 07/04/2017 and CT abdomen exam 12/15/2016 FINDINGS: MRCP: The common bile duct is slightly dilated measuring 8 mm. This tapers smoothly at the ampulla. The intrahepatic bile ducts are of normal caliber. The pancreatic duct is normal. There is a small filling defect within the gallbladder. Small amount of pericholecystic fluid. ABDOMEN: At the liver dome is a 1.9 x 0.7 cm lesion. On series 13 of the delayed sequence this demonstrates some subtle peripheral nodular enhancement suggesting a cavernous hemangioma. The liver otherwise is unremarkable. The spleen, pancreas, adrenal glands, and kidneys are  unremarkable. No adenopathy. MUSCULOSKELETAL: Negative.     1. Mild dilatation of the common bile duct. No evidence for distal obstructing stone or mass. 2. Pericholecystic fluid with tiny stone or sludge in the gallbladder compatible with acute cholecystitis as seen on recent ultrasound. 3. Small lesion at the dome of the liver may represent a cavernous hemangioma. This however, is not confirmed on any of the previous imaging studies. Recommend a follow-up MRI examination in 6 months to confirm stability and exclude neoplasm.     Ct Abdomen Pelvis Without Oral With Iv Contrast    Result Date: 7/4/2017  CT ABDOMEN PELVIS WO ORAL W IV CONTRAST 7/4/2017 6:02 AM     INDICATION: Upper abdominal pain and jaundice. TECHNIQUE: CT abdomen and pelvis. Multiplanar reformation images (MPR). Dose reduction techniques were used. IV CONTRAST: Iohexol (Omni) 100 mL. COMPARISON: None. FINDINGS: LUNG BASES: Please, see CTA chest performed same day. ABDOMEN: Distended gallbladder with interval development of pericholecystic fluid and mild adjacent inflammatory changes, correlate to exclude cholecystitis. 2 x 1 cm hypodensity in the dome of the liver. Mild intrahepatic biliary dilatation. Normal adrenals, pancreas, and spleen. No abdominal or retroperitoneal lymphadenopathy. No hydroureteronephrosis. No obstructing renal or ureteral stones. PELVIS: Mild sigmoid diverticulosis. No evidence of colitis, diverticulitis, or obstruction. Appendix not seen. Mild bladder wall thickening. Normal prostate and seminal vesicles. MUSCULOSKELETAL: Negative.     CONCLUSION: 1.  2 x 1 cm hepatic dome hypodensity; correlate to exclude hepatic metastasis. 2.  Distended gallbladder with pericholecystic fluid and stranding, correlate to exclude cholecystitis. 3.  Interval development of mild intrahepatic biliary dilatation. No significant extrahepatic biliary dilatation. 4.  Please, see CT chest finding same day for thoracic findings.    Us Abdomen  Limited    Result Date: 7/4/2017  US ABDOMEN LIMITED 7/4/2017 11:50 AM INDICATION: Elevated liver function tests. Evaluate, bile duct dilatation. COMPARISON: 6/28/2017. Same day CT abdomen and pelvis. FINDINGS: GALLBLADDER: Cholelithiasis. Mild gallbladder wall thickening and edema (measures 4 mm). BILE DUCTS: No bile duct dilation. The common bile duct measures 5 mm. LIVER: Hepatic hypodensity not well seen. Diffuse mild hyperechogenicity. RIGHT KIDNEY: No hydronephrosis. PANCREAS: Not imaged in detail on this limited study. No incidental abnormalities. No ascites in the right upper quadrant.     CONCLUSION: 1.  Cholelithiasis and findings suggesting cholecystitis. No biliary dilatation. 2.  Prior hepatic dome hypodensity not well seen. Please see same day CT. 3.  Mildly echogenic liver may suggest underlying hepatocellular disease or steatosis.     Us Abdomen Limited    Result Date: 6/28/2017  US ABDOMEN LIMITED 6/28/2017 8:27 AM INDICATION: Abdominal pain. COMPARISON: Same day CT abdomen and pelvis. FINDINGS: GALLBLADDER: Minimal cholelithiasis. No gallbladder wall thickening. Negative ultrasonic Younger's sign. BILE DUCTS: No bile duct dilation. The common bile duct measures 3 mm. LIVER: Normal where seen. RIGHT KIDNEY: No hydronephrosis. PANCREAS: Obscured by bowel gas. No ascites in the right upper quadrant.     CONCLUSION: 1.  Minimal cholelithiasis without cholecystitis. No biliary dilatation.     Cta Chest Abdomen Pelvis    Addendum Date: 6/28/2017    Compared to PET/CT, no significant change of mild stranding along the left psoas muscle (series 5, image 151).     Result Date: 6/28/2017  CTA CHEST ABDOMEN PELVIS 6/28/2017 7:10 AM INDICATION: Aortic dz, non-traumatic, known or suspected. Evaluate for aortic dissection. TECHNIQUE: Multiphase helical acquisition through the chest, abdomen, and pelvis was performed including noncontrast, arterial phase, and delayed images before and after the administration of  IV contrast. 2D and 3D reconstructions were performed by the CT technologist. Dose reduction techniques were used. IV CONTRAST: Iohexol (Omni) 100 mL COMPARISON: PET/CT 6/7/2017. CT PE 6/2/2017. FINDINGS: CT ANGIOGRAM CHEST, ABDOMEN, AND PELVIS: No aortic aneurysm. Noncontrast images demonstrate no evidence of aortic wall hematoma. Contrast-enhanced images demonstrate no dissection. Patent aortic arch vessels with common origin of the brachiocephalic and carotid arteries off the arch. Widely patent celiac, SMA, ROHINI and renal arteries. Accessory right renal artery. Widely patent common iliac, internal iliac, external iliac and partially seen femoral arteries. Mild pulmonary trunk enlargement measuring 3.5 cm. The central pulmonary arteries are clear, other pulmonary arteries are not well evaluated from contrast bolus timing. Right perihilar pulmonary vascular narrowing from mass. Minimal thread of superior right pulmonary vein opacification and near obliteration of the inferior right pulmonary vein. CHEST: LUNGS AND PLEURA: Mild narrowing to complete obliteration of right perihilar airways from mass, as before. Associated right lung volume loss. Stable elongated 14 mm right perihilar nodule (series 5, image 35). Stable 6 mm right lung subpleural nodule (series 5, image 36). Likely no significant change of left lower lobe superior segment irregular nodule (series 5, image 30). Stable other pulmonary nodules. Regions of right lung septal thickening are again noted. No significant change of tiny right pleural effusion and minimal thickening. MEDIASTINUM: No significant change of mediastinal adenopathy with high left paratracheal node measuring 17 x 22 mm (series 5, image 17). No significant change of right perihilar masslike thickening and adenopathy. Normal heart size. No pericardial effusion. Portacatheter tip in the right atrium. ABDOMEN: Minimal cholelithiasis. No biliary dilatation. Unremarkable liver, gallbladder,  spleen, adrenals and kidneys. No adenopathy. PELVIS: No free fluid or adenopathy. Normal caliber bowel without obstruction. MUSCULOSKELETAL: Negative.     CONCLUSION: 1.  Nonaneurysmal aorta without dissection. 2.  The central pulmonary arteries are clear. Right perihilar pulmonary vascular narrowing. Marked narrowing of the right superior pulmonary vein and near obliteration of the right inferior pulmonary vein, as before. 3.  Mild pulmonary trunk enlargement. Correlate for pulmonary hypertension. 4.  No significant change of neoplastic disease burden since recent PET/CT.       Signed by: Nas Hoyos MD

## 2021-06-11 NOTE — PROGRESS NOTES
Pt here for labs and to see MD.  Pt had galbladder out last week and still feels weak.  Pt and MD decided to wait until next week to treat pt.  Port flushed and deaccessed.  Pt aware of plan

## 2021-06-11 NOTE — ANESTHESIA CARE TRANSFER NOTE
Last vitals:   Vitals:    07/05/17 0052   BP: (!) 158/104   Pulse: (!) 103   Resp: 16   Temp: 36.4  C (97.5  F)   SpO2: 100%     Patient's level of consciousness is drowsy  Spontaneous respirations: yes  Maintains airway independently: yes  Dentition unchanged: yes  Oropharynx: oropharynx clear of all foreign objects    QCDR Measures:  ASA# 20 - Surgical Safety Checklist: ASA20A - Safety Checks Done  PQRS# 430 - Adult PONV Prevention: 4558F-8P - Pt did NOT receive => 2 anti-emetic agents  ASA# 8 - Peds PONV Prevention: 4558F-1P - Medical reason for NOT administering combination therapy  PQRS# 424 - Doretha-op Temp Management: 4559F - At least one body temp DOCUMENTED => 35.5C or 95.9F within required timeframe  PQRS# 426 - PACU Transfer Protocol: - Transfer of care checklist used  ASA# 14 - Acute Post-op Pain: ASA14A - Patient experienced pain >= 7 out of 10

## 2021-06-11 NOTE — PROGRESS NOTES
Progress Note    Reason for Visit:  Chief Complaint     Adrenal Problem          Progress Note:    HPI:   This patient is seen in consultation at the request of the primary care physician and after hospital discharge.    The patient is known to have lung cancer diagnosed in 2012 followed up by oncology he had received chemotherapy and radiotherapy.    He had brain metastasis.    He has been admitted to the hospital with adrenal crisis.    Component      Latest Ref Rng & Units 6/12/2017 6/19/2017              Sodium      136 - 145 mmol/L 136 143   Potassium      3.5 - 5.0 mmol/L 3.2 (L) 3.4 (L)   Chloride      98 - 107 mmol/L 96 (L) 103   CO2      22 - 31 mmol/L 28 28   Anion Gap, Calculation      5 - 18 mmol/L 12 12   Glucose      70 - 125 mg/dL 128 (H) 96   BUN      8 - 22 mg/dL 10 7 (L)   Creatinine      0.70 - 1.30 mg/dL 1.04 0.90   GFR MDRD Af Amer      >60 mL/min/1.73m2 >60 >60   GFR MDRD Non Af Amer      >60 mL/min/1.73m2 >60 >60   Bilirubin, Total      0.0 - 1.0 mg/dL 0.6 0.4   Calcium      8.5 - 10.5 mg/dL 9.1 8.8   Protein, Total      6.0 - 8.0 g/dL 7.1 6.5   ALBUMIN      3.5 - 5.0 g/dL 2.9 (L) 2.9 (L)   Alkaline Phosphatase      45 - 120 U/L 62 74   AST      0 - 40 U/L 10 11   ALT      0 - 45 U/L 14 12     INDICATION: Sepsis. Coughing with nausea and vomiting.  TECHNIQUE: CT abdomen and pelvis. Multiplanar reformation images (MPR). Dose reduction techniques were used.   IV CONTRAST: Iohexol (Omni) 100 mL  COMPARISON: 4/3/2017     FINDINGS:  LUNG BASES: Dictated separately. A right lung mass is partially visualized.     ABDOMEN: Hepatic steatosis is suspected. Normal spleen. Normal gallbladder, adrenal glands, end kidneys. No hydronephrosis hydroureter. Normal stomach and small bowel.     PELVIS: Mild circumferential wall thickening of the nondistended urinary bladder. Normal prostate gland and seminal vesicles. Normal colon. The appendix is not discretely visualized     MUSCULOSKELETAL:  Negative.     IMPRESSION:   CONCLUSION:  1.  Circumferential wall thickening of the urinary bladder may be related to underdistention. Consider cystitis.  2.  Hepatic steatosis.  3.  Findings above the diaphragm are dictated separately.  Review of Systems:    Nervous System: No headache, dizziness, fainting or memory loss. No tingling sensation of hand or feet.  Ears: No hearing loss or ringing in the ears  Eyes: No blurring of vision, redness, itching or dryness.  Nose: No nosebleed or loss of smell  Mouth: No mouth sores or loss of taste  Throat: No hoarseness or difficulty swallowing  Neck: No enlarged thyroid or lymph nodes.  Heart: No chest pain, palpitation or irregular heartbeat. No swelling of hands or feet  Lungs: No shortness of breath, cough, night sweats, wheezing or hemoptysis.  Gastrointestinal: No nausea or vomiting, constipation or diarrhea.  No acid reflux, abdominal pain or blood in stools.  Kidney/Bladdr: No polyuria, polydipsia, nocturia or hematuria.  Genital/Sexual: No loss of libido  Skin: No rash, hair loss or hirsutism.  No abnormal striae  Muscles/Joints/Bones: No morning stiffness, muscle aches and pain or loss of height.    Current Medications:  Current Outpatient Prescriptions   Medication Sig     acetaminophen (TYLENOL) 500 MG tablet Take 500-1,000 mg by mouth every 6 (six) hours as needed for pain.     albuterol (PROVENTIL HFA;VENTOLIN HFA) 90 mcg/actuation inhaler Inhale 2 puffs 4 (four) times a day. And every 4 hours as needed for wheeze or cough (Patient taking differently: Inhale 2 puffs every 4 (four) hours as needed. And every 4 hours as needed for wheeze or cough)     benzonatate (TESSALON) 100 MG capsule Take 100 mg by mouth 3 (three) times a day as needed for cough.     calcium, as carbonate, (TUMS) 200 mg calcium (500 mg) chewable tablet Chew 2 tablets as needed for heartburn.     ibuprofen (ADVIL,MOTRIN) 200 MG tablet Take 200-400 mg by mouth every 6 (six) hours as needed  for pain.      inhalational spacing device Spcr Use as directed with inhaler     levothyroxine (SYNTHROID, LEVOTHROID) 150 MCG tablet Take 1 tablet (150 mcg total) by mouth Daily at 6:00 am.     MULTIVITAMIN ORAL Take 1 tablet by mouth daily.     omeprazole (PRILOSEC) 20 MG capsule Take 1 capsule (20 mg total) by mouth 2 (two) times a day. (Patient taking differently: Take 20 mg by mouth Daily before breakfast. )     ondansetron (ZOFRAN-ODT) 4 MG disintegrating tablet Take 4 mg by mouth every 8 (eight) hours as needed for nausea.     oxyCODONE (ROXICODONE) 5 MG immediate release tablet Take 1-2 tablets (5-10 mg total) by mouth every 8 (eight) hours as needed.     predniSONE (DELTASONE) 2.5 MG tablet Take 3 tablets (7.5 mg total) by mouth daily. Please provide 120 tablets in case of acute illness, for stress dosing.     prochlorperazine (COMPAZINE) 10 MG tablet Take 1 tablet (10 mg total) by mouth every 6 (six) hours as needed (For breakthrough nausea/vomiting).     sulfamethoxazole-trimethoprim (BACTRIM DS) 800-160 mg per tablet Take 1 tablet by mouth 3 (three) times a week.       Patients Active Problems:  Patient Active Problem List   Diagnosis     Non-small cell cancer of right lung     Hypothyroidism     Pulmonary embolism     Hypertension     Adrenal insufficiency       History:   reports that he has never smoked. He has never used smokeless tobacco. He reports that he drinks alcohol. He reports that he does not use illicit drugs.   reports that he has never smoked. He has never used smokeless tobacco. He reports that he drinks alcohol. He reports that he does not use illicit drugs.  History   Smoking Status     Never Smoker   Smokeless Tobacco     Never Used      reports that he has never smoked. He has never used smokeless tobacco. He reports that he drinks alcohol. He reports that he does not use illicit drugs.  History   Sexual Activity     Sexual activity: No     Past Medical History:   Diagnosis Date      "Lung cancer      Metastatic cancer      Family History   Problem Relation Age of Onset     Cancer Mother      Breast cancer Mother      No Medical Problems Father      No Medical Problems Brother      No Medical Problems Brother      Past Medical History:   Diagnosis Date     Lung cancer      Metastatic cancer      Past Surgical History:   Procedure Laterality Date     LUNG BIOPSY       PICC  10/26/2016          PORTACATH PLACEMENT      venous port     THYROID SURGERY         Vitals   height is 6' 1\" (1.854 m) and weight is 248 lb 9.6 oz (112.8 kg) (abnormal). His blood pressure is 116/68.         Exam  General appearance: The patient looked well, not in acute distress.  Eyes: no evidence of thyroid eye disease.   Retinal exam: No evidence of diabetic retinopathy.  Mouth and Throat: Normal  Neck: No evidence of thyromegaly, enlarged lymph node or tenderness  Chest: Trachea is central. Chest is clear to auscultation and percussion. Breat sounds are normal.  Cardiovascular exam: JVP is not raised. Heart sounds are normal, no murmurs or rub  Peripheral pulses are palpable.   Abdomen: No masses or tenderness.    Back: No vertebral tenderness or kyphosis.  Extremities: No evidence of leg edema.   Skin: Normal to touch.  No abnormal striae  Neurologic exam:  Visual fields are intact by confrontation, grossly intact. No evidence of peripheral neuropathy.  Detailed foot exam normal.        Diagnosis:  No diagnosis found.    Orders:   No orders of the defined types were placed in this encounter.        Assessment and Plan:  Adrenal insufficiency.    The patient had frequent admissions to the hospital where he feels fatigue and dizzy ACTH stimulation test were done at therapy at 30 minutes serum cortisol was 2.6 at 60 minutes it was 3.    The patient is currently on prednisone 2.5 mg 3 times a day.    I discussed all of this finding with the patient at one stage he had low sodium.  I explained to him that most likely he does " have adrenal insufficiency he has been on courses of steroids for the last 1-1-1/2 years.    To do the test or to repeat the test we have to take the patient off prednisone put him on hydrocortisone and then stop the hydrocortisone for 24 hours before we are we can do the test.    Most likely the patient condition would not tolerate these changes and he will be on the prednisone again.    At this stage would continue prednisone and give him 2. 5 in the morning and 5 at night.    I discussed with the patient to double the dose if he is not feeling well or to be in the hospital.    Hypothyroidism is taking Synthroid 150 mcg has been on 100/1 year we will check the levels.    He takes Prilosec 20 mg.  I did advise the patient if he is feeling unwell for any reason he should go to hospital his mother had breast cancer.  He does not smoke or drink is not  no children.  He also takes Bactrim.    Patient was pale and short of breath.    Patient will return to clinic in 4 month.    I did spend 60 minutes with the patient more than 50% was spent on counseling and managing his care.

## 2021-06-11 NOTE — PROGRESS NOTES
ASSESSMENT:  1. Non-small cell cancer of right lung  With metastasis.    2. Adrenal insufficiency  Recent diagnosis on prednisone    3. Cough  Possibly multifactorial origin.  Question whether there is a pneumonia present, the patient could have rib strain from coughing, the cancer itself could be causing pain.  Question whether or not there is potentially a pleural effusion.  - XR Chest PA and Lateral; Future      PLAN:  1.  Chest x-ray to be over read by radiology.  2.. Treatment with Zithromax in the usual 5 day course.  3.  Lasix 20 mg each morning for 7 days.  4.  Patient has follow-up with Dr. Hoyos in 3 days.  5, see back here as needed.    Orders Placed This Encounter   Procedures     XR Chest PA and Lateral     Standing Status:   Future     Number of Occurrences:   1     Standing Expiration Date:   6/10/2018     Order Specific Question:   Can the procedure be changed per Radiologist protocol?     Answer:   Yes     There are no discontinued medications.    No Follow-up on file.    CHIEF COMPLAINT:  Chief Complaint   Patient presents with     Hospital Visit Follow Up     for adrenal crisis - has a cough o/o- hurts a times        SUBJECTIVE:  Jack is a 32 y.o. male presented to clinic for a follow up from a hospital visit on 5/2/17 for weakness and shortness of breath. He had been coughing for the past two weeks with an increase in pain and frequency and shallow breathing. He is unable to take as deep of a breath as he was able to one week ago. The cough is productive with yellow phlegm. He became sick after a trip to [place] at the end of May 2017. He recalls the past couple of days having coughing attacks that are the worst he has had in the past with an increase in shallow breathing. He has pain in his right shoulder and under his right breast in relationship to the cough. He had concern of a gallbladder attack, which was ruled out in the hospital. He has lung cancer, which is currently stable. Tylenol and  Advil does not help pain. He has a follow up appointment in 3 days.     REVIEW OF SYSTEMS:   He was found to have adrenal insufficiency in the hospital.  All other systems are negative.    PFSH:  Immunization History   Administered Date(s) Administered     Influenza, inj, historic 10/01/2014     Influenza,seasonal quad, PF, 36+MOS 12/17/2016     Pneumo Polysac 23-V 11/11/2016     Tdap 04/16/2011     Social History     Social History     Marital status: Single     Spouse name: N/A     Number of children: N/A     Years of education: N/A     Occupational History     Kitchen      [employer]     Social History Main Topics     Smoking status: Never Smoker     Smokeless tobacco: Never Used     Alcohol use No      Comment: Very rare     Drug use: No     Sexual activity: No     Other Topics Concern     Not on file     Social History Narrative    Lives with girlfriend        Exercise- Walking at work     History reviewed. No pertinent past medical history.  Family History   Problem Relation Age of Onset     Cancer Mother      Breast cancer Mother      No Medical Problems Father      No Medical Problems Brother      No Medical Problems Brother        MEDICATIONS:  Current Outpatient Prescriptions   Medication Sig Dispense Refill     acetaminophen (TYLENOL) 500 MG tablet Take 500-1,000 mg by mouth every 6 (six) hours as needed for pain.       albuterol (PROVENTIL HFA;VENTOLIN HFA) 90 mcg/actuation inhaler Inhale 2 puffs 4 (four) times a day. And every 4 hours as needed for wheeze or cough (Patient taking differently: Inhale 2 puffs every 4 (four) hours as needed. And every 4 hours as needed for wheeze or cough) 18 g 1     benzonatate (TESSALON) 100 MG capsule Take 100 mg by mouth 3 (three) times a day as needed for cough.       calcium, as carbonate, (TUMS) 200 mg calcium (500 mg) chewable tablet Chew 2 tablets as needed for heartburn.       ibuprofen (ADVIL,MOTRIN) 200 MG tablet Take 200-400 mg by mouth every 6 (six) hours as  needed for pain.        inhalational spacing device Spcr Use as directed with inhaler 1 each 0     levothyroxine (SYNTHROID, LEVOTHROID) 150 MCG tablet Take 1 tablet (150 mcg total) by mouth Daily at 6:00 am. 30 tablet 0     MULTIVITAMIN ORAL Take 1 tablet by mouth daily.       omeprazole (PRILOSEC) 20 MG capsule Take 1 capsule (20 mg total) by mouth 2 (two) times a day. (Patient taking differently: Take 20 mg by mouth Daily before breakfast. ) 60 capsule 0     ondansetron (ZOFRAN-ODT) 4 MG disintegrating tablet Take 4 mg by mouth every 8 (eight) hours as needed for nausea.       oxyCODONE (ROXICODONE) 5 MG immediate release tablet Take 1-2 tablets (5-10 mg total) by mouth every 8 (eight) hours as needed. 30 tablet 0     oxyCODONE (ROXICODONE) 5 MG immediate release tablet Take one (1) to two (2) tablets by mouth every 8 hours as needed for pain 60 tablet 0     predniSONE (DELTASONE) 2.5 MG tablet Take 3 tablets (7.5 mg total) by mouth daily. Please provide 120 tablets in case of acute illness, for stress dosing. 120 tablet 1     sulfamethoxazole-trimethoprim (BACTRIM DS) 800-160 mg per tablet Take 1 tablet by mouth 3 (three) times a week. 36 tablet 1     azithromycin (ZITHROMAX Z-ESMER) 250 MG tablet Take 2 tablets (500 mg) on  Day 1,  followed by 1 tablet (250 mg) once daily on Days 2 through 5. 6 tablet 0     furosemide (LASIX) 20 MG tablet One pill each morning for 7 days 7 tablet 11     No current facility-administered medications for this visit.      Facility-Administered Medications Ordered in Other Visits   Medication Dose Route Frequency Provider Last Rate Last Dose     heparin 100 unit/mL lockflush (PF) porcine 300-600 Units  300-600 Units Intravenous PRN Derrick Corado CNP   600 Units at 12/06/16 1502     heparin 100 unit/mL lockflush (PF) porcine 600 Units  600 Units Intravenous PRN Nas Hoyos MD   600 Units at 01/15/16 1445     sodium chloride 0.9 % flush 10 mL (NS)  10 mL Intravenous PRN Nas  BUBBA Hoyos MD   20 mL at 01/15/16 1445     sodium chloride 0.9 % flush 10 mL (NS)  10 mL Intravenous PRN Derrick Corado CNP   10 mL at 12/06/16 1505       TOBACCO USE:  History   Smoking Status     Never Smoker   Smokeless Tobacco     Never Used       VITALS:  Vitals:    06/09/17 1153   BP: 120/70   Pulse: 74   Weight: (!) 252 lb (114.3 kg)     Wt Readings from Last 3 Encounters:   06/09/17 (!) 252 lb (114.3 kg)   06/06/17 (!) 260 lb 6.4 oz (118.1 kg)   06/02/17 (!) 255 lb 8 oz (115.9 kg)       PHYSICAL EXAM:  Constitutional:   Reveals an alert, pleasant male.  Vitals: per nursing notes.  HEENT:  Ears:  External canals, TMs clear.    Eyes:  EOMs full, PERRL.  Lungs: Clear to A&P without rales or wheezes.  Respiratory effort normal.  Cardiac:   Regular rate and rhythm, normal S1, S2, no murmur or gallop.  Musculoskeletal: No peripheral swelling.  Neuro:  Alert and oriented. Cranial nerves, motor, sensory exams are intact.  No gross focal deficits.  Psychiatric:  Memory intact, mood appropriate.  XR: preliminary interpretation: questionable infiltrate right middle lobe.     QUALITY MEASURES:      DATA REVIEWED:  Additional History from Old Records Summarized (2): Reviewed note from 6/2/17 regarding discharge summary.   Decision to Obtain Records (1): None  Radiology Tests Summarized or Ordered (1): Ordered XR  Labs Reviewed or Ordered (1): Reviewed labs  Medicine Test Summarized or Ordered (1): None  Independent Review of EKG, X-RAY, or RAPID STREP (2 each): Reviewed XR from today. Reviewed XR from 4/2/17.       The visit lasted a total of 26 minutes face to face with the patient. Over 50% of the time was spent counseling and educating the patient about coordination of care.    I, Dee Rodriguez, am scribing for and in the presence of, Dr. Ramirez.    IDr. Ramirez, personally performed the services described in this documentation, as scribed by Dee Rodriguez in my presence, and it is both accurate and complete.    Total  data points: 8

## 2021-06-11 NOTE — ANESTHESIA POSTPROCEDURE EVALUATION
Patient: Jack Ricketts  LAPAROSCOPIC CHOLECYSTECTOMY  Anesthesia type: general    Patient location: PACU  Last vitals:   Vitals:    07/05/17 0120   BP: (!) 153/97   Pulse: 97   Resp: 20   Temp:    SpO2: 100%     Post vital signs: stable  Level of consciousness: awake and responds to simple questions  Post-anesthesia pain: pain is being addressed -- patient is receiving dilaudid in PACU, will also get IV acetaminophen  Post-anesthesia nausea and vomiting: no  Pulmonary: unassisted, return to baseline  Cardiovascular: stable and blood pressure at baseline  Hydration: adequate  Anesthetic events: no    QCDR Measures:  ASA# 11 - Doretha-op Cardiac Arrest: ASA11B - Patient did NOT experience unanticipated cardiac arrest  ASA# 12 - Doretha-op Mortality Rate: ASA12B - Patient did NOT die  ASA# 13 - PACU Re-Intubation Rate: ASA13B - Patient did NOT require a new airway mgmt  ASA# 10 - Composite Anes Safety: ASA10A - No serious adverse event  ASA# 38 - New Corneal Injury: ASA38A - No new exposure keratitis or corneal abrasion in PACU    Additional Notes:

## 2021-06-11 NOTE — PROGRESS NOTES
ASSESSMENT:   1. Fever     2. Shortness of breath     3. Non-small cell cancer of right lung          PLAN:  Discussed with patient that he requires further evaluation than can be provided in the [facility] at this time, as he needs a thorough work-up and is likely to be admitted.  Patient transferred to the ED via personal automobile. He declined transport by ambulance. Dr. Deshpande at [facility] ED notified of his impending arrival. Patient agreeable with plan     SUBJECTIVE:   Jack Ricketts is a 32 y.o. male presents today with 6 days complaint of fever. Tmax 100.4. He also complains of loss of appetite, chills, sweats.  He reports a chronic cough and shortness of breath, though worsened since the fever started.  He was in [place] last week hiking and felt relatively good then. Sick contacts: none. Has tried Dayquil without relief.     He has non-small cell lung cancer with liver and brain metastases, last received chemotherapy in December 2016 and radiation treatment 2 years ago. He has had multiple hospitalizations for pneumonia, as well as a PE and sepsis requiring intubation and pressors in Dec 2016. Most recently, he was admitted with a fever and Cdiff in the beginning of April.    Denies sore throat, headache, nasal congestion, diarrhea.    Patient Active Problem List   Diagnosis     Non-small cell cancer of right lung     Hypothyroidism     Anemia     Hypomagnesemia     Pulmonary embolism     Hypertension       History   Smoking Status     Never Smoker   Smokeless Tobacco     Never Used       Current Medications:  Current Outpatient Prescriptions on File Prior to Visit   Medication Sig Dispense Refill     albuterol (PROVENTIL HFA;VENTOLIN HFA) 90 mcg/actuation inhaler Inhale 2 puffs 4 (four) times a day. And every 4 hours as needed for wheeze or cough 18 g 1     levothyroxine (SYNTHROID, LEVOTHROID) 150 MCG tablet Take 1 tablet (150 mcg total) by mouth Daily at 6:00 am. 30 tablet 0     MULTIVITAMIN  ORAL Take 1 tablet by mouth daily.       omeprazole (PRILOSEC) 20 MG capsule Take 1 capsule (20 mg total) by mouth 2 (two) times a day. (Patient taking differently: Take 20 mg by mouth Daily before breakfast. ) 60 capsule 0     ondansetron (ZOFRAN-ODT) 4 MG disintegrating tablet Take 4 mg by mouth every 8 (eight) hours as needed for nausea.       sulfamethoxazole-trimethoprim (BACTRIM DS) 800-160 mg per tablet Take 1 tablet by mouth 3 (three) times a week. 36 tablet 1     acetaminophen (TYLENOL) 500 MG tablet Take 500-1,000 mg by mouth every 6 (six) hours as needed for pain.       calcium, as carbonate, (TUMS) 200 mg calcium (500 mg) chewable tablet Chew 2 tablets as needed for heartburn.       inhalational spacing device Spcr Use as directed with inhaler 1 each 0     levETIRAcetam (KEPPRA) 500 MG tablet Take 1 tablet (500 mg total) by mouth 2 (two) times a day. 60 tablet 1     magnesium oxide (MAGOX) 400 mg tablet Take 1 tablet (400 mg total) by mouth 2 (two) times a day. 60 tablet 0     oxyCODONE (ROXICODONE) 5 MG immediate release tablet Take 1-2 tablets (5-10 mg total) by mouth every 8 (eight) hours as needed. 30 tablet 0     predniSONE (DELTASONE) 10 MG tablet Take 5 tab oral QDay x 7 days then 4 tab QDay x 7 days then 3 tab QDay x7 days then 2 tab QDay x 7 days then 1 tab QDay x 7 days then stop. (Patient taking differently: 40 mg. Take 5 tab oral QDay x 7 days then 4 tab QDay x 7 days then 3 tab QDay x7 days then 2 tab QDay x 7 days then 1 tab QDay x 7 days then stop.) 105 tablet 0     senna-docusate (PERICOLACE) 8.6-50 mg tablet Take 1 tablet by mouth 2 (two) times a day as needed.       vancomycin (VANCOCIN) 125 MG capsule Take 125 mg by mouth 4 (four) times a day.        Current Facility-Administered Medications on File Prior to Visit   Medication Dose Route Frequency Provider Last Rate Last Dose     heparin 100 unit/mL lockflush (PF) porcine 300-600 Units  300-600 Units Intravenous PRN Derrick Corado  CNP   600 Units at 12/06/16 1502     heparin 100 unit/mL lockflush (PF) porcine 600 Units  600 Units Intravenous PRN Nas Hoyos MD   600 Units at 01/15/16 1445     sodium chloride 0.9 % flush 10 mL (NS)  10 mL Intravenous PRN Nas Hoyos MD   20 mL at 01/15/16 1445     sodium chloride 0.9 % flush 10 mL (NS)  10 mL Intravenous PRN Derrick Corado CNP   10 mL at 12/06/16 1505       Allergies:   No Known Allergies    OBJECTIVE:   Vitals:    06/02/17 1428 06/02/17 1444   BP: 112/59    Pulse: (!) 144 (!) 230   Resp: 24    Temp: 100.4  F (38  C)    TempSrc: Oral    SpO2: 96%    Weight: (!) 255 lb 8 oz (115.9 kg)        Physical exam reveals a pleasant 32 y.o. male.   Appears ill, alert, answers questions appropriately.  Lungs: He is slightly tachypneic on exam. Able to speak in full sentences.  His lungs have expiratory wheezes at both lung bases.  No rales or rhonchi appreciated.    Heart: tachycardic rate, regular rhythm, no murmur, rub or gallop  Skin: pink and clammy.

## 2021-06-11 NOTE — PROGRESS NOTES
Pt came into infusion clinic for Day 1, Cycle 2 of his treatment. Pt c/o vomiting x3 this am and diarrhea x1. Ok for Tx based on last weeks labs but re-check LFT's per Dr. Hoyos. Pt requesting medication refills. Refill requests sent to Dr. Hoyos. Labs Reviewed. Medications explained to pt who verbalized understanding. Port patent throughout infusion. Pt tolerated infusion with no complications. Pt left infusion clinic via ambulatory.

## 2021-06-12 NOTE — PROGRESS NOTES
"Dr Jackson before going to vacation placed an order in [facility] for brain sim and rx for new brain mets  I have told by Therapist Dee Gómez information from scheduling , \"the Sim was canceled by Dr Hoyos Medical Oncologist  Pt  Will see Dr Hoyos today  Also pt is scheduled for infusion chemotherapy    "

## 2021-06-12 NOTE — PROGRESS NOTES
Pt ambulatory to [facility] for follow up and new area of concern. Pt has a fever and achy all over. Is going to [facility] after for this appointment. Further recommendations and orders per provider.

## 2021-06-12 NOTE — PROGRESS NOTES
"Pt. ambulated into [facility] after having his PORT accessed and labs drawn in the [facility]. Pt was seen by Derrick Corado NP. Pt. Was instructed and reminded to drink more fluids at home. Chemotherapy treatment was held today d/t elevated biliirubin levels and patient \"not feeling well.\" Pt. Did receive 1 liter of Normal saline fluids with out any problems. PORT flushed with Normal Saliine and 6 cc Heparin and deacessed. Pt. is scheduled to return tomorrow for more intravenous fluids.  "

## 2021-06-12 NOTE — PROGRESS NOTES
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     2012, September - diagnosed with what was thought to be small cell lung cancer, limited stage, located in the mediastinum measuring 10 cm.    Biopsy confirmed CK7 positive, synaptophysin positive chromogranin expressing cells.     2012, December - completed initial treatment with chemoradiation therapy (4 cycles cisplatin and -16).     Posttreatment PET scan showed only a minimal response.     Rebiopsy revealed a component of nonsmall cell lung cancer as well, EGFR and ALK-1 mutation negative.     06/2013 - referred to Dr. Jose Amezcua, [facility], who recommended Taxotere chemotherapy.     2013, July - completed CyberKnife with 4 weekly doses radiosensitizing carboplatin and Taxol chemotherapies.     02/2014, PET scan (compared to prior PET scan in November, 2013) revealed subcarinal node hypermetabolism compatible with active metastasis.     2014, July - completed 6 cycles palliative topotecan chemotherapy.     7/16/2014 PET/CT (compared to 04/28/2014) revealed persistent but decreased subcarinal hypermetabolism with no new hypermetabolic lesions identified.     Due to significant fatigue secondary to chemotherapy, he was given a break from treatment.     2014, September - November resumed cycle #7 topotecan with Avastin added.     In November, 2014, he was only able to get day 1 of the topotecan and Avastin due to fatigue.     2014, December - 2015, February received maintenance Avastin.    03/11/15 PET showed recurrent mild focal FDG activity in the subcarinal region of the mediastinum and new 2.3 cm FDG avid lesion in the liver, suspicious  for metastatic lung cancer    03/19/15 liver biopsy negative    08/5/15 PET showed progressive disease in a subcarinal lymph node, enlarging and increasingly FDG avid hepatic metastasis, and renewed tumor metabolism at the primary tumor site in the right hilum.    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     08/19/15 MRI brain - multiple enhancing lesions in the bilateral cerebral hemispheres.     2015, September 4 - completed 2,200 cGy CyberKnife radiosurgery.     2015, September - completed 2 cycles Alimta with Avastin    10/12/15 LFT elevation prompted restaging PET showing disease progression.    2015, December - completed 3+ cycles Topotecan with Avastin chemotherapy.    01/13/16 restaging PET - progressive adenopathy and right liver disease.    PD-L1 positive @ 60%.     2016, July 18 - completed 9 cycles Keytruda     07/11/16 PET - Area of active tumor metabolism in the right hilum has enlarged and increased in FDG activity. A single right hilar lymph node has become moderately FDG avid. No evidence of active tumor metabolism outside of the right hemithorax.    07/26/16 EBUS pathology showed recurrent neuroendocrine carcinoma.     08/15/16 - 3 day history of left sided weakness, occasional speech slurring and headaches yet managed with Tylenol.     08/16/16 brain MRI - showed radiation necrosis. Began dex 4 mg t.i.d. with food.     2016, October 24 - completed 3 cycles Opdivo.    10/25 - 11/08/16 - hospitalized @ 's ICU with acute respiratory failure - suspected Opdivo related. Treated emperically for PCP and placed on steroids. Bronch showed no evidence of malignancy, only mixed inflammatory cells. No pneumocystis carinii.     11/15/16 MR head - interval decrease in the irregular enhancement as well as the T2 prolongation in the right corona radiata and right periventricular region. No new enhancing intracranial lesions.    11/21/16  follow up Dr. Dacosta [facility] - head MRI improved. Off dexamethasone. Follow up in 3 months with MRI head.    12/15 - 12/20/16 hospitalized with shortness of breath. Found to have a PE as well as CT showing progression of his lung cancer.     He was started on Lovenox for anticoagulation which was later transitioned to warfarin.    12/05/16 - 02/20/17 completed 3 cycles carboplatin + Etoposide palliative chemotherapy followed by Neulasta.    03/05 - 03/18/17 - hospitalized with shortness of breath, hemoptysis and fever.     Progressed to respiratory failure and intubation.     It appeared he was bleeding from the right lung where there was cancer invasion of the bronchus. The bleeding stopped and he was extubated and discharged home.     It was decided not to continue anticoagulation.     The plan was for him to recuperate and then consider further treatment for the lung cancer.    04/03 - 04/07/17 - hospitalized with fever, hypotension, multiple joint pains and swellings and abdominal discomfort.     Left knee tapped - white blood cells in the fluid with about 45% neutrophils.     Procalcitonin quite low and lactic acid WNL.     CT CAP - quite stable in the chest.  Thickening of the ascending colon and focal inflammation in the fat between the left psoas and iliacus muscles.     Influenza A & B negative.    + C. Difficile - on oral vancomycin    Blood cultures negative.    06/19/17 - D1C1 single agent Abraxane at 260 mg/m .  Due to his prior chemotherapy and his body habitus he is dosed per ideal body weight (570 mg IV given every 3 weeks).       07/05 - Laparoscopic cholecystectomy and abdominal washout for acute Gangrenous cholecystitis.     07/10/17 - D1C2 single agent Abraxane at 260 mg/m     08/01/17 MRI brain - Interval enlargement of two presumed left frontal lobe metastases measuring 5 and 3 mm in size.  The size of the right frontal lobe lesions is not changed. Surrounding T2 hyperintensity is also stable.  The enhancement of these lesions is mildly more intense than prior, the difference possibly being technical. They do not demonstrate definite elevated relative cerebral blood volume.    08/11/17 - D1C3 single agent Abraxane at 260 mg/m     08/30/17 CT CAP - Slight interval increase in size of right perihilar mass and hilar lymphadenopathy with obstruction of the right mainstem bronchus resulting in collapse of the right middle lobe.  Low dense lesion in the right lobe of the liver just under the diaphragm appears unchanged. It is not adequately characterized.  A similar-appearing lesion is now seen in the right lobe near the diamond hepatis.  Small nodule in the periphery of the right upper lobe is unchanged.    09/06/17:    CBC - HgB 12.0.  WBC 5.4.  Plts 226.  ANC 3.6    CMP - hypokalemia and hypoalbuminemia.  Magnesium still a bit low.     With diarrhea, gave 4 grams IV instead of oral magnesium.    Began Kdur 10 meq twice daily.  Encouraged to eat a daily banana as well.    Instructed on increased dietary protein.     CT showed only slight changes, not at the 20% threshold to change therapy.  However, with his diarrhea and history of C.dif, will hold D1C4 Abraxane and obtain stool cultures.    Cough generally managed with albuterol inhaler or tessalon.     09/11/17:    CBC - HgB up a bit.  WBC, ANC and Plts WNL.    CMP - more hyponatremic.  Bili up a bit.    Will hydrate with 1 liter SS.    Begin Probiotics.    Stools (+) C.dif last week and started on Vanco 125 every 6 hours on 09/08.    Saw Dr. Jackson this morning.  She will notify him of CK therapy dates to MRI noted enlarging left frontal lobe metastases.    His polyarthralgia complaints continue, stable, likely immune mediated following prior treatment with Nivolumab and Pembrolizumab.      Continue oral prednisone @ 7.5 mg daily - Rx renewed.      His symptoms are rather nonspecific, possibly r/t C.dif, possibly r/t cancer.  Will continue to hold  chemotherapy and monitor closely.  He may come in for daily IVFs if needed.  If he should worsen, he should be seen in ED.  May need to update CT ABD if symptoms don't improve.    09/15/17 - follow up to consider two-week delayed C4 Abraxane.    2) Pulmonary embolism:    Was on Lovenox.  Currently on no anticoagulation.    3)  Hypothyroid:    TSH 0.03.    Secondary to Pembrolizumab    On replacement with synthroid dose decreased from 150 to 112 mcg daily last week.    ECOG Performance Status @ 1        TT > 25 minutes face to face with > 50% counseling and care coordination.    Derrick Corado, CNP  ______________________________________________________________________________    Interim History:    The patient presents stating he's been feeling worse the past couple days with the fatigue and nausea and gassiness.  He's noted chills but no fever.  His joint aches persist, managed with 5 mg oxycodone as needed, usually no more than about 3 times daily.  His abdominal pain has pretty much resolved.  However, states he is not eating very well.  He is drinking water but not enough he believes.  He is noting early satiety, however his weight is up a couple pounds since last week.  He denies headache, visual or mentation disturbance, bladder issues.  His intermittent cough has returned.  He still notes a darker loose stool each day but this has significantly improved from the watery and bile colored stools last week.  He is using supplemental oxygen again from time to time.  He is trying to keep up with his walking as this is his main activity.  He is now on disability from his work.      Review of Systems:    10 point review of systems negative there than what is listed in HPI.    Physical Exam:    Recent Vitals 9/11/2017   Height -   Weight 245 lbs   BSA (m2) -   /76   Pulse 112   Temp 99.3   Temp src 1   SpO2 92   Some recent data might be hidden       GENERAL:   Alert and oriented.  No acute distress.      HEENT:   Atraumatic and normocephalic.  CAPRICE, EOMI.  No pallor.  No icterus.  No mucosal lesions.    LYMPH NODES:  No palpable cervical, axillary or inguinal lymphadenopathy.    CHEST:   Quite clear.    Port-a-cath site looks good.    CVS:    S1 and S2 heard. Regular rate and rhythm.  No murmur, gallop or rub heard.    ABDOMEN:   Soft.  Not tender.  Not distended.  No palpable hepatomegaly or splenomegaly.  No other mass palpable.    EXTREMITIES:  Warm.  No joint swelling or edema.    SKIN:    No noted rash or bruising.    Lab Results:    Reviewed with patient.    Recent Results (from the past 24 hour(s))   TSH (add-ons/treatment plan)   Result Value Ref Range    TSH 0.03 (L) 0.30 - 5.00 uIU/mL   Comprehensive Metabolic Panel   Result Value Ref Range    Sodium 134 (L) 136 - 145 mmol/L    Potassium 3.4 (L) 3.5 - 5.0 mmol/L    Chloride 97 (L) 98 - 107 mmol/L    CO2 27 22 - 31 mmol/L    Anion Gap, Calculation 10 5 - 18 mmol/L    Glucose 109 70 - 125 mg/dL    BUN 6 (L) 8 - 22 mg/dL    Creatinine 0.88 0.70 - 1.30 mg/dL    GFR MDRD Af Amer >60 >60 mL/min/1.73m2    GFR MDRD Non Af Amer >60 >60 mL/min/1.73m2    Bilirubin, Total 1.6 (H) 0.0 - 1.0 mg/dL    Calcium 9.0 8.5 - 10.5 mg/dL    Protein, Total 6.5 6.0 - 8.0 g/dL    Albumin 3.2 (L) 3.5 - 5.0 g/dL    Alkaline Phosphatase 81 45 - 120 U/L    AST 17 0 - 40 U/L    ALT 20 0 - 45 U/L   HM1 (CBC with Diff)   Result Value Ref Range    WBC 6.6 4.0 - 11.0 thou/uL    RBC 3.67 (L) 4.40 - 6.20 mill/uL    Hemoglobin 12.8 (L) 14.0 - 18.0 g/dL    Hematocrit 35.9 (L) 40.0 - 54.0 %    MCV 98 80 - 100 fL    MCH 34.9 (H) 27.0 - 34.0 pg    MCHC 35.7 32.0 - 36.0 g/dL    RDW 18.4 (H) 11.0 - 14.5 %    Platelets 207 140 - 440 thou/uL    MPV 9.6 8.5 - 12.5 fL    Neutrophils % 62 50 - 70 %    Lymphocytes % 25 20 - 40 %    Monocytes % 11 (H) 2 - 10 %    Eosinophils % 2 0 - 6 %    Basophils % 0 0 - 2 %    Neutrophils Absolute 4.1 2.0 - 7.7 thou/uL    Lymphocytes Absolute 1.7 0.8 - 4.4 thou/uL     Monocytes Absolute 0.7 0.0 - 0.9 thou/uL    Eosinophils Absolute 0.1 0.0 - 0.4 thou/uL    Basophils Absolute 0.0 0.0 - 0.2 thou/uL       Imaging:    Reviewed with patient.    CT CHEST, ABDOMEN, AND PELVIS  8/30/2017 1:01 PM      INDICATION: Lung cancer, non-small cell, staging.  TECHNIQUE: CT chest, abdomen, and pelvis. Dose reduction techniques were used.   IV CONTRAST: Iohexol (Omni) 100 mL.  COMPARISON: 7/4/2017.     FINDINGS:   CHEST: A right hilar and perihilar mass is again noted. It measures 6.4 x 3.1 cm on series 2, image 38 and previously measured 6 x 2.8 cm on a similar image. Narrowing of the right lower lobe bronchus and obliteration of the right middle lobe bronchus   are again noted. There is collapse of the right middle lobe, similar to the prior exam. A 7 mm subpleural nodule in the right upper lobe on series 3, image 47 is unchanged. The small right pleural effusion is slightly increased compared to the prior   exam. No new pulmonary nodules are identified. The pleural spaces appear normal. Fiducial markers are seen in the right perihilar region.      ABDOMEN: Cholecystectomy. A 1 x 2 cm low dense lesion is again seen in the right lobe of the liver just under the dome of diaphragm on series 2, image 58. It is unchanged compared to the prior exam. There is a new area of low density near the diamond   hepatis in the right lobe of the liver on series 2, image 69 measuring 2.6 x 1.4 cm. It is not well-defined and may represent a small area of periportal edema. However, it is not adequately characterized.     The spleen, adrenal glands, kidneys, and pancreas appear normal. The abdominal aorta is normal in caliber. Small retroperitoneal lymph nodes are within normal limits of size.     PELVIS: There is no evidence of free air, free fluid, or fluid collection. No lymphadenopathy.     MUSCULOSKELETAL: Left internal jugular vein Port-A-Cath. No skeletal lesions are identified to suggest skeletal  metastases.     IMPRESSION:   CONCLUSION:  1.  Slight interval increase in size of right perihilar mass and hilar lymphadenopathy with obstruction of the right mainstem bronchus resulting in collapse of the right middle lobe.     2.  Low dense lesion in the right lobe of the liver just under the diaphragm appears unchanged. It is not adequately characterized. Follow-up MRI was previously recommended. A similar-appearing lesion is now seen in the right lobe near the diamond hepatis.   This lesion should also be evaluated with MRI.     3.  Small nodule in the periphery of the right upper lobe is unchanged.

## 2021-06-12 NOTE — PROGRESS NOTES
Spent 10 minutes with Jack.  We are recommending treatment of the 2 new left frontal lesions.  He has had SRS previously and understands the logic and rationale of proceeding.  Sara Perez MD, FACS, FAANS

## 2021-06-12 NOTE — PROGRESS NOTES
[facility] Hematology and Oncology Progress Note    Patient: Jack Ricketts  MRN: [id number]  Date of Service: 8/7/2017           Reason for visit      1. Non-small cell lung cancer, unspecified laterality         Assessment     1. Jack is very pleasant 32 y.o. gentleman with  non-small cell type of a lung cancer with neuroendocrine differentiation initially stage IIIB and later on developed liver metastases, treated with concurrent chemoradiation therapy with cisplatin and etoposide  then given more radiation and weekly carboplatinum and Taxol.  He had recurrence documented February 2014 and has received 6 cycles of topotecan. In December 2014 started on maintenance Avastin.  PET scan in March 2015 showed questionable recurrence in the liver.  In August 2015 Biopsy confirmed NSCLC with neuroendocrine differentiation. Started on Alimta and Avastin.  He developed brain metastases September 2015 while on that.  Treatment changed to topotecan with Avastin.  In January 2016 PET scan showed progression again and at that time he got started on on pembrolizumab.  PET scan in August 2016 showed slight worsening in the right hilar area.  So in August 2016 he started on Opdivo.  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  He was actually on treatment break recovering from pneumonitis that he got admitted at [facility] with pulmonary embolism.  His CT scan showed progression of his lung cancer.  In December 2016 started on carboplatin and etoposide.  3 cycles.  Septic episodes after that needing intubation and pressors.  In the beginning of April 2017 came in with polyarthritis and on prednisone again.  The current PET scan shows stable amount of FDG uptake in the right hilar area. Now on ABraxane. Tolerated it well.  In July 2017 he had acute cholecystitis and needed his gall bladder removed.  We delayed his treatment for about a week and then resume  treatment.  So far he has had 2 cycles of it.  He is having some wheezing.  He is using nebulizers etc.  Some days his wheezing does get better.  2. Brain metastases which have been treated with CyberKnife l. 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  MRI now showing Left cerebral lesion growing in size and intensity.  3. Hypothyroidism due to Pembrolizumab. On replacement.  4. Pulmonary embolism was on Lovenox.  Currently on no anticoagulation.  5. Significant joint pains in the beginning of April 2017.  Better on prednisone.  6. C. difficile colitis diagnosed in April 2017.  7.   Adrenal insufficiency diagnosed in May 2017.    Plan     1. Proceed with chemotherapy today. I would see him in 3 weeks with a CT chest abdomen and pelvis.  I need to see if he is responding or not..  2. Continue on prednisone  replacement therapy.  3. To see Dr. Jackson with plan for CK therapy later this month or next.  4.   Continue with healthy diet.  5. I also asked him to keep a diary of his respiratory symptoms.  I want to see if his respiratory symptoms are getting worse in between the chemotherapy treatments.    Stage      Lung Cancer    Primary site: Lung (Right) small cell as well as non-small cell lung cancer adenocarcinoma    Clinical: Stage IIIA (T2b, N2, M0) signed by Nas Hoyos MD on 9/26/2014  1:17 PM    Summary: Stage IIIA (T2b, N2, M0)      History     Jack Ricketts is a very pleasant 32 y.o. old male with a history of what was thought to be small cell lung cancer, limited stage, in 09/2012 located in the mediastinum measuring 10 cm.  Biopsy confirmed CK7 positive, synaptophysin positive chromogranin expressing cells.  Initial treatment involved chemoradiation therapy with cisplatin and -16.  Post treatment PET scan showed only a minimal response.  Rebiopsy revealed a component of nonsmall cell lung cancer as well, EGFR and ALK-1 mutation negative.  He was referred to Dr. Jose Amezcua  [facility] who recommended Taxotere chemotherapy.  The patient opted for radiation therapy (CyberKnife) with radiosensitizing weekly carboplatin and Taxol chemotherapies initiated in June 2013.  He tolerated treatment quite well.  He had complete response to the treatment.       February, 2014, PET scan (compared to prior PET scan in November, 2013) revealed subcarinal node hypermetabolism compatible with active metastasis. This was also biopsied and proven to be malignant and suggestive of small cell type of cancer.  We started him on palliative topotecan chemotherapy day 1 through 5 every 3 weeks.  Cycle one was initiated on 03/04/2014 and he finished 6 cycles of that.  His PET scan after 3 cycles showed minimal response then after 6 cycles his PET scan showed continued but incomplete response.  He did need some transfusion after the fifth cycle.    After couple months break was resumed his treatment with topotecan and Avastin.  He had 3 cycles .  His PET scan after the 2nd cycle had shown complete response.  He started on that his third cycle but could not completed because of respiratory infections.  After that in December 2014 he started on Avastin as a single agent for maintenance.  He has been on that until March 2015 when PET scan that actually showed a questionable uptake in his liver as well as the subcarinal lymph node.  He got a CT-guided biopsy of the liver lesion which was non diagnostic. At the time of the biopsy they had trouble locating the lesion.  Then in August 2015 the liver lesions got bigger and we biopsied again and came back positive for neuroendocrine carcinoma of lung origin.  He was started on treatment with Alimta and Avastin.  However he developed brain metastases in September.  Treated with CyberKnife therapy.  We change his treatment to topotecan and Avastin.  He has had couple cycles of that.  He became quite anemic and needed transfusion last week.  His brain MRI in the  beginning of January 2016 showed improvement.  However his PET scan in January 2016 showed worsening in the liver lesion.    We tested his tumor for PDL-1 mutation and he did have favorable mutation for which pembrolizumab would be a targeted agent. He started on that in the end of January 2016.  He was noted to be hypothyroid after 3 cycles. Started on synthroid. He did notice depression which is better after synthroid. In August 2016 his PET scan showed progression. He was started on Opdivo. He did fine initially but then later on in October he started to have pneumonitis-type of picture.  It was felt to be secondary to Opdivo.    He was put on some steroid Opdivo was stopped.    He was admitted at [facility] second week of December 2016 with shortness of breath.  He was found to have pulmonary embolism as well as his CT scan showed progression of his lung cancer.  We started him on palliative chemotherapy with carboplatin and etoposide in December and he got 3 cycles of it.  He needed Neulasta.  He had a rough time after the third cycle.  He was admitted with pneumonia and other and problems.  In fact he needed to be intubated and was on pressors.  He has recovered from that.  He has been on observation since late February 2017.    Due to persistent disease on PET scan, started on Abraxane in June. He was admitted with RUQ pain and found to have acute cholecystitis and on July 4th he had his gall bladder removed. Feels better now. Resumed treatment. Noticing increased wheezing off and on.    Past Medical History     Bronchitis, lung cancer, renal insufficiency    Family History   Problem Relation Age of Onset     Cancer Mother      Breast cancer Mother      No Medical Problems Father      No Medical Problems Brother      No Medical Problems Brother      Social History   Substance Use Topics     Smoking status: Never Smoker     Smokeless tobacco: Never Used     Alcohol use Yes      Comment: social        Stage      Lung Cancer    Primary site: Lung (Right)    Clinical: Stage IIIA (T2b, N2, M1b) signed by Nas Hoyos MD on 2015 10:08 AM    Summary: Stage IIIA (T2b, N2, M1b)     Treatment History     1. 2012 cisplatin and -16 along with concurrent radiation therapy for 2 cycles.  2. Weekly carboplatin and Taxol along with radiation therapy in 2013.  3. 2014 topotecan day 1 through 5 every 3 weeks for 6 cycles  4. 2014 topotecan and Avastin ×3 cycles.  Third cycle was cut Short.  5. 2014 Avastin 15 mg/kg every 3 weeks maintenance treatment.  6. 2015 Avastin and Alimta.  7. Developed brain metastases in 2015 treated with CyberKnife.  8. 2015 started on topotecan with Avastin.  PET scan in 2016 showed progression.  9. 2016 started on pembrolizumab.  10. Opdivo started in August 15, 2016.  11. Carboplatin and etoposide 16 - 17 completed 3 cycles   12.  Abraxane with Avastin 2017    Review of Systems   Constitutional  Constitutional (WDL): Exceptions to WDL  Fatigue: Fatigue relieved by rest (occ moderate)  Neurosensory  Neurosensory (WDL): Exceptions to WDL  Peripheral Sensory Neuropathy: Asymptomatic, loss of deep tendon reflexes or paresthesia (very slight in fingertips worse post TX)  Cardiovascular  Cardiovascular (WDL): Exceptions to WDL  Pulmonary  Cough: Moderate symptoms, medical intervention indicated, limiting instrumental ADL (yellow phlem)  Dyspnea: Shortness of breath with minimal exertion, limiting instrumental ADL (worse with coughing)    Gastrointestinal  Gastrointestinal (WDL): Exceptions to WDL  Vomitin - 2 episodes ( by 5 minutes) in 24 hrs (gag reflex when coughing and will throw up/ once daily)  Genitourinary  Genitourinary (WDL): All genitourinary elements are within defined limits  Integumentary  Integumentary (WDL): Exceptions to WDL  Alopecia: Hair loss of up to 50% of  normal for that individual that is not obvious from a distance but only on close inspection, a different hair style may be required to cover the hair loss but it does not require a wig or hair piece to camouflage  Patient Coping  Patient Coping: Accepting  ECOG Performance   1  Pain Status  Currently in Pain: No/denies  Accompanied by  Accompanied by: Alone      Vital Signs     Vitals:    08/07/17 1247   BP: 124/76   Pulse: (!) 104   Temp: 98.8  F (37.1  C)   SpO2: 96%       Physical Exam     Constitutional: Oriented to person, place, and time and appears well-developed.   HEENT:  Normocephalic and atraumatic.  Eyes: Conjunctivae and EOM are normal. Pupils are equal, round, and reactive to light. No discharge.  No scleral icterus. Nose normal. Mouth/Throat: Oropharynx is clear and moist. No oropharyngeal exudate.    NECK: Normal range of motion. Neck supple. No JVD present. No tracheal deviation present.  Scar from surgery and the neck is present.   CARDIOVASCULAR: Elevated heart rate, regular rhythm and intact distal pulses.  Exam reveals no gallop and no friction rub.  Systolic murmur present.  PULMONARY: Effort normal and breath sounds normal. No respiratory distress. He has wheezing and coarse rhonchi b/l.  ABDOMEN: Several ecchymosis areas. Incision looks good. Soft. Bowel sounds are normal. No distension and no mass. Minimal tenderness. There is no rebound and no guarding. No HSM.  MUSCULOSKELETAL: Normal range of motion. No edema and no tenderness. Mild kyphosis, no tenderness.  LYMPH NODES: Has no cervical, supraclavicular, axillary and groin adenopathy.   NEUROLOGICAL: Alert and oriented to person, place, and time. No cranial nerve deficit.  Normal muscle tone. Coordination normal.   GENITOURINARY: Deferred exam.  SKIN: Skin is warm and dry. Rash on back of the neck . No erythema. No pallor.   EXTREMITIES: No cyanosis, no clubbing, no edema. No Deformity.  PSYCHIATRIC: He is slight anxiety.      Lab Results      Results for orders placed or performed in visit on 08/07/17   Comprehensive Metabolic Panel   Result Value Ref Range    Sodium 140 136 - 145 mmol/L    Potassium 3.6 3.5 - 5.0 mmol/L    Chloride 101 98 - 107 mmol/L    CO2 28 22 - 31 mmol/L    Anion Gap, Calculation 11 5 - 18 mmol/L    Glucose 94 70 - 125 mg/dL    BUN 6 (L) 8 - 22 mg/dL    Creatinine 0.93 0.70 - 1.30 mg/dL    GFR MDRD Af Amer >60 >60 mL/min/1.73m2    GFR MDRD Non Af Amer >60 >60 mL/min/1.73m2    Bilirubin, Total 1.0 0.0 - 1.0 mg/dL    Calcium 9.0 8.5 - 10.5 mg/dL    Protein, Total 6.6 6.0 - 8.0 g/dL    Albumin 3.5 3.5 - 5.0 g/dL    Alkaline Phosphatase 89 45 - 120 U/L    AST 14 0 - 40 U/L    ALT 20 0 - 45 U/L   HM1 (CBC with Diff)   Result Value Ref Range    WBC 4.8 4.0 - 11.0 thou/uL    RBC 3.54 (L) 4.40 - 6.20 mill/uL    Hemoglobin 11.3 (L) 14.0 - 18.0 g/dL    Hematocrit 33.6 (L) 40.0 - 54.0 %    MCV 95 80 - 100 fL    MCH 31.9 27.0 - 34.0 pg    MCHC 33.6 32.0 - 36.0 g/dL    RDW 18.8 (H) 11.0 - 14.5 %    Platelets 180 140 - 440 thou/uL    MPV 9.2 8.5 - 12.5 fL    Neutrophils % 64 50 - 70 %    Lymphocytes % 26 20 - 40 %    Monocytes % 10 2 - 10 %    Eosinophils % 1 0 - 6 %    Basophils % 0 0 - 2 %    Neutrophils Absolute 3.1 2.0 - 7.7 thou/uL    Lymphocytes Absolute 1.2 0.8 - 4.4 thou/uL    Monocytes Absolute 0.5 0.0 - 0.9 thou/uL    Eosinophils Absolute 0.0 0.0 - 0.4 thou/uL    Basophils Absolute 0.0 0.0 - 0.2 thou/uL    reviewed his labs from his recent hospitalization.      Distress Assessment       Distress Assessment Score: 4     Imaging Results       Mr Brain With Without Contrast    Result Date: 8/1/2017  HEAD MRI WITHOUT AND WITH IV CONTRAST 8/1/2017 12:40 PM INDICATION: Follow-up brain metastasis. TECHNIQUE: Head MRI without and with intravenous contrast. Dynamic susceptibility contrast MR perfusion imaging was performed. CONTRAST: 10 mL Gadavist. COMPARISON: Brain MRI 06/07/2017. FINDINGS: Lesion along the lateral margin of the right  lateral ventricle is more intensely enhancing but unchanged in size. Adjacent T2 signal hyperintensity is stable. Similar changes associated with the right frontal lesion. Interval enlargement of an intra-axial enhancing lesion in the posterior parasagittal left frontal lobe now measures 5 mm in size versus 1 mm previously. Increased associated T2 signal abnormality. A 3 mm left middle frontal gyrus lesion in adjacent T2 signal hyperintensity is also increased (coronal postcontrast image 19). No definite elevated relative cerebral blood volume associated with the right frontal lesions. It is possible there is a small focus of elevated signal associated with the posterior parasagittal left frontal lesion (series 500.2 image 16) although this does not localize exactly. Small enhancing lesion in the parasagittal left frontal lobe restricted diffusion. No restricted diffusion to suggest a recent infarct. No evidence for recent hemorrhage. Signal loss associated with the two right frontal lesions unchanged. No significant  intracranial mass effect.     CONCLUSION: 1.  Interval enlargement of two presumed left frontal lobe metastases measuring 5 and 3 mm in size. 2.  The size of the right frontal lobe lesions is not changed. Surrounding T2 hyperintensity is also stable. The enhancement of these lesions is mildly more intense than prior, the difference possibly being technical. They do not demonstrate definite elevated relative cerebral blood volume.      Signed by: Nas Hoyos MD

## 2021-06-12 NOTE — PROGRESS NOTES
[facility] Radiation Oncology Follow Up Note    Patient: Jack Ricketts  MRN: [id number]  Date of Service: 09/11/2017    Assessment / Impression     1. Non-small cell lung cancer, right        Non-small cell lung cancer, unspecified laterality    Staging form: Lung, AJCC 7th Edition    - Clinical: Stage IV (T2b, N2, M1b) - Signed by Nas Hoyos MD on 8/20/2015    - Pathologic: No stage assigned - Unsigned  ECOG Peformance Status  ECOG Performance Status: 1  Distress Assessment Score  Distress Assessment Score: 5  Body site: Brain    Plan:   Proceed with SRS to 2 new frontal mets.   Nauseated, C diff +, on vanco. Probably dehydrated will get fluids today with chemotherapy.     Face to face time  40 minutes with > 75% spent on consultation, education and coordination of care.    Subjective:        HPI: Jack Ricketts is a 32 y.o. male with was diagnosed with a small cell lung cancer and was treated with standard chemotherapy and radiation receiving 4500 centigray in 15 fractions of 150 cGy given on a twice a day scheduled. His chemotherapy consisting of cisplatin. After a poor response to treatment he had a repeat biopsy which was positive for large cell carcinoma with neuroendocrine features. He underwent his 2nd course of concurrent chemoradiation therapy receiving a stereotactic approach to 50 Gy in 25 fractions using the CyberKnife radiosurgical technique with real-time fiducial guided tracking. This treatment was delivered from June 25, 2013 through August 1, 2013. He subsequently had measurable disease on PET scan. He's been undergoing chemotherapy since that time. A PET/CT on 4/28/2014 showed some response to treatment but an incomplete response as some hypermetabolic activity was still present. Since his documented recurrence in February 2014 he has received 6 cycles of topotecan. After 2 months break he was started on topotecan and Avastin. He has finished 2 treatments of those. PET scan shows  complete remission. He was only able to get day 1 of the topotecan and Avastin for his third cycle. In December 2014 started on maintenance Avastin. PET scan in March showed questionable recurrence in the liver. Current PET scan shows progression. Biopsy confirmed NSCLC with neuroendocrine differentiation. Started on Alimta and Avastin.        On August 19, 2015 he had an MRI of the brain which showed multiple enhancing lesions in both cerebral hemispheres consistent with metastatic lung cancer.        He was treated with Cyberknife radiosurgery. The following is a summary of treatment information.       Course: C1    Treatment Site     Energy     Dose/Fx (cGy)     #Fx     Dose Correction (cGy)     Total Dose (cGy)     Start Date     End Date     Elapsed Days       2 Lesions     6X     2,200     1 / 1     0     2,200     9/4/2015 9/4/2015     0       Rt Caudate     6X     2,200     1 / 1     0     2,200     9/8/2015 9/8/2015     0       Right Frontal     6X     2,200     1 / 1     0     2,200     9/9/2015 9/9/2015     0       Left Frontal     6X     2,200     1 / 1     0     2,200     9/8/2015 9/8/2015            New MRI shows enlargement of 2 small brain mets in left frontal lobe    Chief Complaint   Patient presents with     Cyberknife     HE Cancer     Radiation   .    Current Outpatient Prescriptions   Medication Sig Dispense Refill     acetaminophen (TYLENOL) 500 MG tablet Take 500-1,000 mg by mouth every 6 (six) hours as needed for pain.       albuterol (PROAIR HFA;PROVENTIL HFA;VENTOLIN HFA) 90 mcg/actuation inhaler Inhale 2 puffs every 4 (four) hours as needed for wheezing.       benzonatate (TESSALON) 100 MG capsule Take 1 capsule (100 mg total) by mouth 3 (three) times a day as needed for cough. 30 capsule 0     calcium, as carbonate, (TUMS) 200 mg calcium (500 mg) chewable tablet Chew 2 tablets every 4 (four) hours as needed for heartburn.        cholecalciferol, vitamin D3, (VITAMIN D3)  5,000 unit Tab Take by mouth daily.       docusate sodium (COLACE) 100 MG capsule Take 100-200 mg by mouth 2 (two) times a day as needed for constipation.       levothyroxine (SYNTHROID) 112 MCG tablet Take 1 tablet (112 mcg total) by mouth daily. Replaces 150 mcg dose. 30 tablet 2     MULTIVITAMIN ORAL Take 1 tablet by mouth daily.       omeprazole (PRILOSEC) 20 MG capsule Take 1 capsule (20 mg total) by mouth 2 (two) times a day. 60 capsule 0     ondansetron (ZOFRAN-ODT) 4 MG disintegrating tablet Take 4 mg by mouth every 8 (eight) hours as needed for nausea.       oxyCODONE (ROXICODONE) 5 MG immediate release tablet Take 1 tablet (5 mg total) by mouth every 6 (six) hours as needed for pain. 120 tablet 0     polyethylene glycol (GLYCOLAX) 17 gram/dose powder Take 17 g by mouth daily as needed (for constipation).  0     potassium chloride SA (K-DUR,KLOR-CON) 10 MEQ tablet Take 1 tablet (10 mEq total) by mouth 2 (two) times a day. 60 tablet 1     predniSONE (DELTASONE) 2.5 MG tablet Take 2 tablets (5 mg total) by mouth daily with supper. (Patient taking differently: Take 2.5-5 mg by mouth 2 (two) times a day. Takes 2.5 mg QAM and 5 mg QHS)  0     sulfamethoxazole-trimethoprim (BACTRIM DS) 800-160 mg per tablet Take 1 tablet by mouth 3 (three) times a week. 36 tablet 1     vancomycin (VANCOCIN) 125 MG capsule Take 1 capsule (125 mg total) by mouth 4 (four) times a day for 10 days. 40 capsule 0     No current facility-administered medications for this visit.      Facility-Administered Medications Ordered in Other Visits   Medication Dose Route Frequency Provider Last Rate Last Dose     heparin 100 unit/mL lockflush (PF) porcine 300-600 Units  300-600 Units Intravenous PRN Derrick Corado CNP   600 Units at 12/06/16 1502     heparin 100 unit/mL lockflush (PF) porcine 600 Units  600 Units Intravenous PRN Nas Hoyos MD   600 Units at 01/15/16 1445     sodium chloride 0.9 % flush 10 mL (NS)  10 mL Intravenous PRN  Nas Hoyos MD   20 mL at 01/15/16 1445     sodium chloride 0.9 % flush 10 mL (NS)  10 mL Intravenous PRN Derrick Corado CNP   10 mL at 16 1505       The following portions of the patient's history were reviewed and updated as appropriate: allergies, current medications, past family history, past medical history, past social history, past surgical history and problem list.    Review of Systems    Constitutional  Constitutional (WDL): Exceptions to WDL  Fatigue: Fatigue relieved by rest  Neurosensory  Neurosensory (WDL): Exceptions to WDL  Peripheral Sensory Neuropathy: Asymptomatic, loss of deep tendon reflexes or paresthesia  Eye        Eye Disorder (WDL): All eye disorder elements are within defined limits  Ear     Cardiovascular  Cardiovascular (WDL): All cardiovascular elements are within defined limits  Pulmonary  Respiratory (WDL): Exceptions to WDL  Cough: Moderate symptoms, medical intervention indicated, limiting instrumental ADL  Dyspnea: Shortness of breath with minimal exertion, limiting instrumental ADL  Gastrointestinal  Gastrointestinal (WDL): Exceptions to WDL  Anorexia: Loss of appetite without alteration in eating habits  Nausea: Loss of appetite without alteration in eating habits  Vomitin - 2 episodes ( by 5 minutes) in 24 hrs  Dysphagia: Symptomatic, able to eat regular diet  Diarrhea: Increase of <4 stools per day over baseline, mild increase in ostomy output compared to baseline (on meds for c-diff)  Genitourinary  Genitourinary (WDL): All genitourinary elements are within defined limits              Musculoskeletal              Musculoskeletal and Connetive Tissue Disorders (WDL): Exceptions to WDL  Arthralgia: Mild pain  Muscle Weakness : Symptomatic, perceived by patient but not evident on physical exam  Myalgia: Mild pain  Integumentary  Integumentary (WDL): Exceptions to WDL  Alopecia: Hair loss of >50% normal for that individual that is readily apparent to  others, a wig or hair piece is necessary if the patient desires to completely camouflage the hair loss, associated with psychosocial impact  Patient Coping  Patient Coping: Accepting  Pain              Currently in Pain: Yes  Accompanied by  Accompanied by: Alone    Objective:     Physical Exam    Vitals:    09/11/17 1122   BP: 112/76   Pulse: (!) 112   Temp: 99.3  F (37.4  C)   TempSrc: Oral   SpO2: 92%   Weight: (!) 245 lb (111.1 kg)           General appearance: alert, appears stated age and cooperative  Head: Normocephalic, without obvious abnormality, atraumatic  Eyes: negative findings: lids and lashes normal, conjunctivae and sclerae normal and corneas clear  Neck: no adenopathy, no JVD, supple, symmetrical, trachea midline and thyroid not enlarged, symmetric, no tenderness/mass/nodules  Extremities: extremities normal, atraumatic, no cyanosis or edema  Skin: Skin color, texture, turgor normal. No rashes or lesions  Lymph nodes: Cervical, supraclavicular,   Neurologic: Alert and oriented X 3, normal strength and tone. Normal symmetric. Gait normal.  reflexes. Normal coordination and gait    Recent Labs:   Recent Results (from the past 168 hour(s))   Comprehensive Metabolic Panel   Result Value Ref Range    Sodium 137 136 - 145 mmol/L    Potassium 3.2 (L) 3.5 - 5.0 mmol/L    Chloride 100 98 - 107 mmol/L    CO2 24 22 - 31 mmol/L    Anion Gap, Calculation 13 5 - 18 mmol/L    Glucose 99 70 - 125 mg/dL    BUN 4 (L) 8 - 22 mg/dL    Creatinine 0.97 0.70 - 1.30 mg/dL    GFR MDRD Af Amer >60 >60 mL/min/1.73m2    GFR MDRD Non Af Amer >60 >60 mL/min/1.73m2    Bilirubin, Total 0.6 0.0 - 1.0 mg/dL    Calcium 9.1 8.5 - 10.5 mg/dL    Protein, Total 6.7 6.0 - 8.0 g/dL    Albumin 3.4 (L) 3.5 - 5.0 g/dL    Alkaline Phosphatase 72 45 - 120 U/L    AST 18 0 - 40 U/L    ALT 22 0 - 45 U/L   HM1 (CBC with Diff)   Result Value Ref Range    WBC 5.4 4.0 - 11.0 thou/uL    RBC 3.48 (L) 4.40 - 6.20 mill/uL    Hemoglobin 12.0 (L) 14.0 -  18.0 g/dL    Hematocrit 34.1 (L) 40.0 - 54.0 %    MCV 98 80 - 100 fL    MCH 34.5 (H) 27.0 - 34.0 pg    MCHC 35.2 32.0 - 36.0 g/dL    RDW 21.0 (H) 11.0 - 14.5 %    Platelets 226 140 - 440 thou/uL    MPV 9.4 8.5 - 12.5 fL    Neutrophils % 66 50 - 70 %    Lymphocytes % 21 20 - 40 %    Monocytes % 12 (H) 2 - 10 %    Eosinophils % 1 0 - 6 %    Basophils % 0 0 - 2 %    Neutrophils Absolute 3.6 2.0 - 7.7 thou/uL    Lymphocytes Absolute 1.1 0.8 - 4.4 thou/uL    Monocytes Absolute 0.7 0.0 - 0.9 thou/uL    Eosinophils Absolute 0.1 0.0 - 0.4 thou/uL    Basophils Absolute 0.0 0.0 - 0.2 thou/uL   Manual Differential   Result Value Ref Range    Platelet Estimate Normal Normal   Magnesium   Result Value Ref Range    Magnesium 1.4 (L) 1.8 - 2.6 mg/dL   TSH (add-ons/treatment plan)   Result Value Ref Range    TSH 0.03 (L) 0.30 - 5.00 uIU/mL   Culture, Stool   Result Value Ref Range    Culture       No Salmonella, Shigella, Yersinia, or Campylobacter.   EnteroHaemorrhagic E. coli Work Up   Result Value Ref Range    Shiga Toxin 1 Negative Negative    Shiga Toxin 2 Negative Negative   C. Diff Toxin By PCR   Result Value Ref Range    C.Difficile Toxigenic by PCR Positive (!) Negative   Ova and Parasite, Stool   Result Value Ref Range    Ova + Parasite Exam No ova or parasites seen No Ova or Parasites seen   Comprehensive Metabolic Panel   Result Value Ref Range    Sodium 134 (L) 136 - 145 mmol/L    Potassium 3.4 (L) 3.5 - 5.0 mmol/L    Chloride 97 (L) 98 - 107 mmol/L    CO2 27 22 - 31 mmol/L    Anion Gap, Calculation 10 5 - 18 mmol/L    Glucose 109 70 - 125 mg/dL    BUN 6 (L) 8 - 22 mg/dL    Creatinine 0.88 0.70 - 1.30 mg/dL    GFR MDRD Af Amer >60 >60 mL/min/1.73m2    GFR MDRD Non Af Amer >60 >60 mL/min/1.73m2    Bilirubin, Total 1.6 (H) 0.0 - 1.0 mg/dL    Calcium 9.0 8.5 - 10.5 mg/dL    Protein, Total 6.5 6.0 - 8.0 g/dL    Albumin 3.2 (L) 3.5 - 5.0 g/dL    Alkaline Phosphatase 81 45 - 120 U/L    AST 17 0 - 40 U/L    ALT 20 0 - 45 U/L    HM1 (CBC with Diff)   Result Value Ref Range    WBC 6.6 4.0 - 11.0 thou/uL    RBC 3.67 (L) 4.40 - 6.20 mill/uL    Hemoglobin 12.8 (L) 14.0 - 18.0 g/dL    Hematocrit 35.9 (L) 40.0 - 54.0 %    MCV 98 80 - 100 fL    MCH 34.9 (H) 27.0 - 34.0 pg    MCHC 35.7 32.0 - 36.0 g/dL    RDW 18.4 (H) 11.0 - 14.5 %    Platelets 207 140 - 440 thou/uL    MPV 9.6 8.5 - 12.5 fL    Neutrophils % 62 50 - 70 %    Lymphocytes % 25 20 - 40 %    Monocytes % 11 (H) 2 - 10 %    Eosinophils % 2 0 - 6 %    Basophils % 0 0 - 2 %    Neutrophils Absolute 4.1 2.0 - 7.7 thou/uL    Lymphocytes Absolute 1.7 0.8 - 4.4 thou/uL    Monocytes Absolute 0.7 0.0 - 0.9 thou/uL    Eosinophils Absolute 0.1 0.0 - 0.4 thou/uL    Basophils Absolute 0.0 0.0 - 0.2 thou/uL       Imaging: Imaging results 30 days: Ct Chest Abdomen Pelvis With Oral With Iv Cont    Result Date: 8/30/2017  CT CHEST, ABDOMEN, AND PELVIS 8/30/2017 1:01 PM      INDICATION: Lung cancer, non-small cell, staging. TECHNIQUE: CT chest, abdomen, and pelvis. Dose reduction techniques were used. IV CONTRAST: Iohexol (Omni) 100 mL. COMPARISON: 7/4/2017. FINDINGS: CHEST: A right hilar and perihilar mass is again noted. It measures 6.4 x 3.1 cm on series 2, image 38 and previously measured 6 x 2.8 cm on a similar image. Narrowing of the right lower lobe bronchus and obliteration of the right middle lobe bronchus are again noted. There is collapse of the right middle lobe, similar to the prior exam. A 7 mm subpleural nodule in the right upper lobe on series 3, image 47 is unchanged. The small right pleural effusion is slightly increased compared to the prior exam. No new pulmonary nodules are identified. The pleural spaces appear normal. Fiducial markers are seen in the right perihilar region.  ABDOMEN: Cholecystectomy. A 1 x 2 cm low dense lesion is again seen in the right lobe of the liver just under the dome of diaphragm on series 2, image 58. It is unchanged compared to the prior exam. There is a  new area of low density near the diamond hepatis in the right lobe of the liver on series 2, image 69 measuring 2.6 x 1.4 cm. It is not well-defined and may represent a small area of periportal edema. However, it is not adequately characterized. The spleen, adrenal glands, kidneys, and pancreas appear normal. The abdominal aorta is normal in caliber. Small retroperitoneal lymph nodes are within normal limits of size. PELVIS: There is no evidence of free air, free fluid, or fluid collection. No lymphadenopathy. MUSCULOSKELETAL: Left internal jugular vein Port-A-Cath. No skeletal lesions are identified to suggest skeletal metastases.     CONCLUSION: 1.  Slight interval increase in size of right perihilar mass and hilar lymphadenopathy with obstruction of the right mainstem bronchus resulting in collapse of the right middle lobe. 2.  Low dense lesion in the right lobe of the liver just under the diaphragm appears unchanged. It is not adequately characterized. Follow-up MRI was previously recommended. A similar-appearing lesion is now seen in the right lobe near the diamond hepatis.  This lesion should also be evaluated with MRI. 3.  Small nodule in the periphery of the right upper lobe is unchanged.      Signed by: Carley Jackson MD

## 2021-06-12 NOTE — PROGRESS NOTES
Pt here for abraxane after seeing MD.  No problems with infusion.  It took quite awhile for pharmacy to get medication here.  Pt denies new complaint.  Port flushed upon completion and pt d/c ambulatory to lobby alone

## 2021-06-12 NOTE — PROGRESS NOTES
I met with Jack in infusion today.  He stated he could use some help applying for Social Security Disability.  He has looked at the application online but has questions.  I provided Jack with the SSD application checklist, the 2017 SSD benefits booklet, the list of MARCIN conditions, and the numbers for the Disability Link[employer] Line ([phone]) and [employer] ([phone] option 5).  I stated I called both services today and was told the Disability Linkage Line can walk him through the application process by phone for free.  They suggest he complete what he is able to prior to calling.  When I called [employer] I was not able to confirm if the service is free but was told when Jack calls and speaks with one of the reps, they can inform him if there is a fee.  I told Jack I recently spoke with someone who was very pleased with [employer] and she said they met in person to complete the application.  She had told me the service was free.  I highlighted the MARCIN condition of Non-small cell lung cancer and explained if he includes this in his application, it will be processed more quickly.  He was appreciative of the assistance.  He has no further questions or needs at this time.  I provided my card and invited calls.  I told Jack I will continue to f/u with him regularly.

## 2021-06-12 NOTE — PROGRESS NOTES
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     2012, September - diagnosed with what was thought to be small cell lung cancer, limited stage, located in the mediastinum measuring 10 cm.    Biopsy confirmed CK7 positive, synaptophysin positive chromogranin expressing cells.     2012, December - completed initial treatment with chemoradiation therapy (4 cycles cisplatin and -16).     Posttreatment PET scan showed only a minimal response.     Rebiopsy revealed a component of nonsmall cell lung cancer as well, EGFR and ALK-1 mutation negative.     06/2013 - referred to Dr. Jose Amezcua, [facility], who recommended Taxotere chemotherapy.     2013, July - completed CyberKnife with 4 weekly doses radiosensitizing carboplatin and Taxol chemotherapies.     02/2014, PET scan (compared to prior PET scan in November, 2013) revealed subcarinal node hypermetabolism compatible with active metastasis.     2014, July - completed 6 cycles palliative topotecan chemotherapy.     7/16/2014 PET/CT (compared to 04/28/2014) revealed persistent but decreased subcarinal hypermetabolism with no new hypermetabolic lesions identified.     Due to significant fatigue secondary to chemotherapy, he was given a break from treatment.     2014, September - November resumed cycle #7 topotecan with Avastin added.     In November, 2014, he was only able to get day 1 of the topotecan and Avastin due to fatigue.     2014, December - 2015, February received maintenance Avastin.    03/11/15 PET showed recurrent mild focal FDG activity in the subcarinal region of the mediastinum and new 2.3 cm FDG avid lesion in the liver, suspicious for  metastatic lung cancer    03/19/15 liver biopsy negative    08/5/15 PET showed progressive disease in a subcarinal lymph node, enlarging and increasingly FDG avid hepatic metastasis, and renewed tumor metabolism at the primary tumor site in the right hilum.    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     08/19/15 MRI brain - multiple enhancing lesions in the bilateral cerebral hemispheres.     2015, September 4 - completed 2,200 cGy CyberKnife radiosurgery.     2015, September - completed 2 cycles Alimta with Avastin    10/12/15 LFT elevation prompted restaging PET showing disease progression.    2015, December - completed 3+ cycles Topotecan with Avastin chemotherapy.    01/13/16 restaging PET - progressive adenopathy and right liver disease.    PD-L1 positive @ 60%.     2016, July 18 - completed 9 cycles Keytruda     07/11/16 PET - Area of active tumor metabolism in the right hilum has enlarged and increased in FDG activity. A single right hilar lymph node has become moderately FDG avid. No evidence of active tumor metabolism outside of the right hemithorax.    07/26/16 EBUS pathology showed recurrent neuroendocrine carcinoma.     08/15/16 - 3 day history of left sided weakness, occasional speech slurring and headaches yet managed with Tylenol.     08/16/16 brain MRI - showed radiation necrosis. Began dex 4 mg t.i.d. with food.     2016, October 24 - completed 3 cycles Opdivo.    10/25 - 11/08/16 - hospitalized @ 's ICU with acute respiratory failure - suspected Opdivo related. Treated emperically for PCP and placed on steroids. Bronch showed no evidence of malignancy, only mixed inflammatory cells. No pneumocystis carinii.     11/15/16 MR head - interval decrease in the irregular enhancement as well as the T2 prolongation in the right corona radiata and right periventricular region. No new enhancing intracranial lesions.    11/21/16  follow up Dr. [facility] - head MRI improved. Off dexamethasone. Follow up in 3 months with MRI head.    12/15 - 12/20/16 hospitalized with shortness of breath. Found to have a PE as well as CT showing progression of his lung cancer.     He was started on Lovenox for anticoagulation which was later transitioned to warfarin.    12/05/16 - 02/20/17 completed 3 cycles carboplatin + Etoposide palliative chemotherapy followed by Neulasta.    03/05 - 03/18/17 - hospitalized with shortness of breath, hemoptysis and fever.     Progressed to respiratory failure and intubation.     It appeared he was bleeding from the right lung where there was cancer invasion of the bronchus. The bleeding stopped and he was extubated and discharged home.     It was decided not to continue anticoagulation.     The plan was for him to recuperate and then consider further treatment for the lung cancer.    04/03 - 04/07/17 - hospitalized with fever, hypotension, multiple joint pains and swellings and abdominal discomfort.     Left knee tapped - white blood cells in the fluid with about 45% neutrophils.     Procalcitonin quite low and lactic acid WNL.     CT CAP - quite stable in the chest.  Thickening of the ascending colon and focal inflammation in the fat between the left psoas and iliacus muscles.     Influenza A & B negative.    + C. Difficile - on oral vancomycin    Blood cultures negative.    06/19/17 - single agent Abraxane at 260 mg/m .  Due to his prior chemotherapy and his body habitus he is dosed per ideal body weight (570 mg IV given every 3 weeks).       08/01/17 MRI brain - Interval enlargement of two presumed left frontal lobe metastases measuring 5 and 3 mm in size.  The size of the right frontal lobe lesions is not changed. Surrounding T2 hyperintensity is also stable. The enhancement of these lesions is mildly more intense than prior, the difference possibly being technical. They do not demonstrate definite elevated  relative cerebral blood volume.    08/30/17 CT CAP - Slight interval increase in size of right perihilar mass and hilar lymphadenopathy with obstruction of the right mainstem bronchus resulting in collapse of the right middle lobe.  Low dense lesion in the right lobe of the liver just under the diaphragm appears unchanged. It is not adequately characterized.  A similar-appearing lesion is now seen in the right lobe near the diamond hepatis.  Small nodule in the periphery of the right upper lobe is unchanged.    09/06/17:    CBC - HgB 12.0.  WBC 5.4.  Plts 226.  ANC 3.6    CMP - hypokalemia and hypoalbuminemia.  Magnesium still a bit low.     With diarrhea, will give 4 grams IV instead of oral magnesium.    Will begin Kdur 10 meq twice daily.  Encouraged to eat a daily banana as well.    Instructed on increased dietary protein.    His polyarthralgia complaints continue, stable, likely immune mediated following prior treatment with Nivolumab and Pembrolizumab.      Continue oral prednisone @ 7.5 mg daily.       CT shows only slight changes, not at the 20% threshold to change therapy.  However, with his diarrhea and history of C.dif, will hold D1C4 Abraxane and obtain stool cultures.    Cough generally managed with albuterol inhaler or tessalon.     Refer to Dr. Jackson to consider CK therapy to MRI noted enlarging left frontal lobe metastases.    09/11/17 - follow up to consider one week delayed C4 Abraxane.    2) Pulmonary embolism:    Was on Lovenox.  Currently on no anticoagulation.    3)  Hypothyroid:    TSH 0.03.    Secondary to Pembrolizumab    On replacement with synthroid dose decreased from 150 to 112 mcg daily.    ECOG Performance Status @ 1        TT > 25 minutes face to face with > 50% counseling and care coordination.    Derrick Corado, CNP  ______________________________________________________________________________    Interim History:    The patient arrived looking and feeling quite fatigued.  He  notes joint aches, managed with 5 mg oxycodone about 3 times daily.  His abdominal pain has pretty much resolved.  He states he is not eating like he used to.  He is drinking a lot of water and states he's mostly eating granola bars.  He is noting early satiety and his weight has dropped about 5 pounds since his last treatment.  He denies fever, chills, headache, visual or mentation disturbance, bladder issues.  His intermittent cough has returned.  About 4-5 days ago his bowels became dark and tarry at first and are now watery and bile colored.  He is using supplemental oxygen again from time to time.  He is trying to keep up with his walk as this is his main activity.  He went to the fair with his girlfriend and states he walked a lot but his legs got quite tired.  He is now on disability from his work.      Review of Systems:    10 point review of systems negative there than what is listed in HPI.    Physical Exam:    Recent Vitals 8/7/2017   Height -   Weight 249 lbs 5 oz   BSA (m2) -   /76   Pulse 104   Temp 98.8   Temp src 1   SpO2 96   Some recent data might be hidden       GENERAL:   Alert and oriented.  No acute distress.     HEENT:   Atraumatic and normocephalic.  CAPRICE, EOMI.  No pallor.  No icterus.  No mucosal lesions.    LYMPH NODES:  No palpable cervical, axillary or inguinal lymphadenopathy.    CHEST:   Quite clear.    Port-a-cath site looks good.    CVS:    S1 and S2 heard. Regular rate and rhythm.  No murmur, gallop or rub heard.    ABDOMEN:   Soft.  Not tender.  Not distended.  No palpable hepatomegaly or splenomegaly.  No other mass palpable.    EXTREMITIES:  Warm.  No joint swelling or edema.    SKIN:    No noted rash or bruising.    Lab Results:    Reviewed with patient.    Recent Results (from the past 24 hour(s))   Comprehensive Metabolic Panel   Result Value Ref Range    Sodium 137 136 - 145 mmol/L    Potassium 3.2 (L) 3.5 - 5.0 mmol/L    Chloride 100 98 - 107 mmol/L    CO2 24 22 - 31  mmol/L    Anion Gap, Calculation 13 5 - 18 mmol/L    Glucose 99 70 - 125 mg/dL    BUN 4 (L) 8 - 22 mg/dL    Creatinine 0.97 0.70 - 1.30 mg/dL    GFR MDRD Af Amer >60 >60 mL/min/1.73m2    GFR MDRD Non Af Amer >60 >60 mL/min/1.73m2    Bilirubin, Total 0.6 0.0 - 1.0 mg/dL    Calcium 9.1 8.5 - 10.5 mg/dL    Protein, Total 6.7 6.0 - 8.0 g/dL    Albumin 3.4 (L) 3.5 - 5.0 g/dL    Alkaline Phosphatase 72 45 - 120 U/L    AST 18 0 - 40 U/L    ALT 22 0 - 45 U/L   HM1 (CBC with Diff)   Result Value Ref Range    WBC 5.4 4.0 - 11.0 thou/uL    RBC 3.48 (L) 4.40 - 6.20 mill/uL    Hemoglobin 12.0 (L) 14.0 - 18.0 g/dL    Hematocrit 34.1 (L) 40.0 - 54.0 %    MCV 98 80 - 100 fL    MCH 34.5 (H) 27.0 - 34.0 pg    MCHC 35.2 32.0 - 36.0 g/dL    RDW 21.0 (H) 11.0 - 14.5 %    Platelets 226 140 - 440 thou/uL    MPV 9.4 8.5 - 12.5 fL    Neutrophils % 66 50 - 70 %    Lymphocytes % 21 20 - 40 %    Monocytes % 12 (H) 2 - 10 %    Eosinophils % 1 0 - 6 %    Basophils % 0 0 - 2 %    Neutrophils Absolute 3.6 2.0 - 7.7 thou/uL    Lymphocytes Absolute 1.1 0.8 - 4.4 thou/uL    Monocytes Absolute 0.7 0.0 - 0.9 thou/uL    Eosinophils Absolute 0.1 0.0 - 0.4 thou/uL    Basophils Absolute 0.0 0.0 - 0.2 thou/uL   Manual Differential   Result Value Ref Range    Platelet Estimate Normal Normal   Magnesium   Result Value Ref Range    Magnesium 1.4 (L) 1.8 - 2.6 mg/dL   TSH (add-ons/treatment plan)   Result Value Ref Range    TSH 0.03 (L) 0.30 - 5.00 uIU/mL       Imaging:    Reviewed with patient.    CT CHEST, ABDOMEN, AND PELVIS  8/30/2017 1:01 PM      INDICATION: Lung cancer, non-small cell, staging.  TECHNIQUE: CT chest, abdomen, and pelvis. Dose reduction techniques were used.   IV CONTRAST: Iohexol (Omni) 100 mL.  COMPARISON: 7/4/2017.     FINDINGS:   CHEST: A right hilar and perihilar mass is again noted. It measures 6.4 x 3.1 cm on series 2, image 38 and previously measured 6 x 2.8 cm on a similar image. Narrowing of the right lower lobe bronchus and  obliteration of the right middle lobe bronchus   are again noted. There is collapse of the right middle lobe, similar to the prior exam. A 7 mm subpleural nodule in the right upper lobe on series 3, image 47 is unchanged. The small right pleural effusion is slightly increased compared to the prior   exam. No new pulmonary nodules are identified. The pleural spaces appear normal. Fiducial markers are seen in the right perihilar region.      ABDOMEN: Cholecystectomy. A 1 x 2 cm low dense lesion is again seen in the right lobe of the liver just under the dome of diaphragm on series 2, image 58. It is unchanged compared to the prior exam. There is a new area of low density near the diamond   hepatis in the right lobe of the liver on series 2, image 69 measuring 2.6 x 1.4 cm. It is not well-defined and may represent a small area of periportal edema. However, it is not adequately characterized.     The spleen, adrenal glands, kidneys, and pancreas appear normal. The abdominal aorta is normal in caliber. Small retroperitoneal lymph nodes are within normal limits of size.     PELVIS: There is no evidence of free air, free fluid, or fluid collection. No lymphadenopathy.     MUSCULOSKELETAL: Left internal jugular vein Port-A-Cath. No skeletal lesions are identified to suggest skeletal metastases.     IMPRESSION:   CONCLUSION:  1.  Slight interval increase in size of right perihilar mass and hilar lymphadenopathy with obstruction of the right mainstem bronchus resulting in collapse of the right middle lobe.     2.  Low dense lesion in the right lobe of the liver just under the diaphragm appears unchanged. It is not adequately characterized. Follow-up MRI was previously recommended. A similar-appearing lesion is now seen in the right lobe near the diamond hepatis.   This lesion should also be evaluated with MRI.     3.  Small nodule in the periphery of the right upper lobe is unchanged.

## 2021-06-12 NOTE — PROGRESS NOTES
Pt. Presents to infusion center for IV fluids. Low grade temp. At 99.6 orally continues. Dr. Hoyos notified of pt.s increased cough and nausea. New orders for zofran 8mg IVP given and extra 500cc of normal saline IV given today. New order placed for cough syrup with codeine. Pt. Will p/u later today. 02 sats remain between 93 and 95%.

## 2021-06-12 NOTE — PROGRESS NOTES
Pt. States that he has had a worsening of his cough the past few days. Pt. Evaluated by MONIKA Corado CNP yesterday. Pt.s temp. Orally was 99.8 when he arrived and was 98.6 when he left. Enc. Pt. To check temp. Orally tonight  And call if it increases above 100.2 and/ or  To call if his cough worsens. At present pt. Coughing unproductively today with at times some yellow sputum. No blood in sputum noted.

## 2021-06-12 NOTE — PROGRESS NOTES
C.dif (+) from stool culture.  Patient notified and Vancomycin  mg every 6 hours for 10 days escribed to his pharmacy.  If symptoms would worsen he is to contact us.

## 2021-06-13 NOTE — PROGRESS NOTES
Assessment/Plan:   32 year old male with metastatic lung cancer and adrenal insufficiency, history of PE presents with chest pain, tightness and increased shortness of breath today.  He has had URI and hoarseness for several days.  No fever. Chronic cough, worse today.  Vomiting an ongoing problem but worse today.  Feels mildly dehydrated.  Recent hospital for pneumonia.  Has PE on problem list. No leg swelling or pain.   Here he has tachycardia and tachypnea, sats 95%, retching and emesis.  We discussed options and the high risk if his concerns and the more efficient way he can be evaluated and made more comfortable in the ER.  He requested to transport himself across the parking lot to the ER and seemed stable to do so. Declined an albuterol neb or ECG here.    Report called to ER provider.      Subjective:      Jack Ricketts is a 32 y.o. male who presents with CP and tightness and shortness of breath. Extensive past medical history reviewed including metastatic lung cancer, adrenal insufficiency, history of PE.  Recent hospital admit reviewed briefly.      Objective:     BP (!) 138/110  Pulse (!) 123  Temp 98.3  F (36.8  C) (Oral)   Resp 26  Wt (!) 234 lb (106.1 kg)  SpO2 95%  BMI 30.87 kg/m2    Physical  General Appearance: Alert, cooperative, slightly anxious, tachypneic.  Rapid pulse, regular. No calf pain or leg swelling, no retractions or audible wheezing.

## 2021-06-13 NOTE — PROGRESS NOTES
Progress Note    Reason for Visit:      Progress Note:    HPI:    This pleasant 32-year-old male patient is here for follow-up he has adrenal insufficiency.    Component      Latest Ref Rng & Units 10/4/2017 10/18/2017   Sodium      136 - 145 mmol/L 136 135 (L)   Potassium      3.5 - 5.0 mmol/L 3.5 3.5   Chloride      98 - 107 mmol/L 97 (L) 97 (L)   CO2      22 - 31 mmol/L 31 28   Anion Gap, Calculation      5 - 18 mmol/L 8 10   Glucose      70 - 125 mg/dL 115    BUN      8 - 22 mg/dL 8    Creatinine      0.70 - 1.30 mg/dL 0.98 0.82   GFR MDRD Af Amer      >60 mL/min/1.73m2 >60 >60   GFR MDRD Non Af Amer      >60 mL/min/1.73m2 >60 >60   Bilirubin, Total      0.0 - 1.0 mg/dL 1.1 (H)    Calcium      8.5 - 10.5 mg/dL 9.1    Protein, Total      6.0 - 8.0 g/dL 6.7    ALBUMIN      3.5 - 5.0 g/dL 2.8 (L)    Alkaline Phosphatase      45 - 120 U/L 81    AST      0 - 40 U/L 11    ALT      0 - 45 U/L 10    Free T4      0.7 - 1.8 ng/dL  1.0   T3, Total      45 - 175 ng/dL  69   TSH      0.30 - 5.00 uIU/mL     Cortisol      ug/dL  1.4   Adrenocorticotropic Hormone      pg/mL  6.0 (L)             Review of Systems:    Nervous System: No headache, dizziness, fainting or memory loss. No tingling sensation of hand or feet.  Ears: No hearing loss or ringing in the ears  Eyes: No blurring of vision, redness, itching or dryness.  Nose: No nosebleed or loss of smell  Mouth: No mouth sores or loss of taste  Throat: No hoarseness or difficulty swallowing  Neck: No enlarged thyroid or lymph nodes.  Heart: No chest pain, palpitation or irregular heartbeat. No swelling of hands or feet  Lungs: No shortness of breath, cough, night sweats, wheezing or hemoptysis.  Gastrointestinal: No nausea or vomiting, constipation or diarrhea.  No acid reflux, abdominal pain or blood in stools.  Kidney/Bladdr: No polyuria, polydipsia, nocturia or hematuria.  Genital/Sexual: No loss of libido  Skin: No rash, hair loss or hirsutism.  No abnormal  striae  Muscles/Joints/Bones: No morning stiffness, muscle aches and pain or loss of height.    Current Medications:  Current Outpatient Prescriptions   Medication Sig     acetaminophen (TYLENOL) 500 MG tablet Take 500-1,000 mg by mouth every 6 (six) hours as needed for pain.     albuterol (ACCUNEB) 0.63 mg/3 mL nebulizer solution Take 3 mL (0.63 mg total) by nebulization every 6 (six) hours as needed for wheezing or shortness of breath.     albuterol (PROAIR HFA;PROVENTIL HFA;VENTOLIN HFA) 90 mcg/actuation inhaler Inhale 2 puffs every 4 (four) hours as needed for wheezing.     benzonatate (TESSALON) 100 MG capsule Take 1 capsule (100 mg total) by mouth 3 (three) times a day as neededfor cough.     calcium, as carbonate, (TUMS) 200 mg calcium (500 mg) chewable tablet Chew 2 tablets every 4 (four) hours as needed for heartburn.      cholecalciferol, vitamin D3, (VITAMIN D3) 5,000 unit Tab Take 5,000 Units by mouth daily.      Lactobacillus rhamnosus GG (CULTURELLE) 10-15 Billion cell capsule Take 1 capsule by mouth daily.     levothyroxine (SYNTHROID) 112 MCG tablet Take 1 tablet (112 mcg total) by mouth daily. Replaces 150 mcg dose.     MULTIVITAMIN ORAL Take 1 tablet by mouth daily.     omeprazole (PRILOSEC) 20 MG capsule Take 20 mg by mouth daily before breakfast.     ondansetron (ZOFRAN-ODT) 4 MG disintegrating tablet Take 1 tablet (4 mg total) by mouth every 8 (eight) hours as needed for nausea.     oxyCODONE (ROXICODONE) 10 mg immediate release tablet Take 1 tablet (10 mg total) by mouth every 6 (six) hours.     oxyCODONE (ROXICODONE) 5 MG immediate release tablet Take 1 tablet (5 mg total) by mouth every 4 (four) hours as needed for pain.     polyethylene glycol (GLYCOLAX) 17 gram/dose powder Take 17 g by mouth daily as needed (for constipation).     potassium chloride SA (K-DUR,KLOR-CON) 10 MEQ tablet Take 1 tablet (10 mEq total) by mouth 2 (two) times a day.     predniSONE (DELTASONE) 2.5 MG tablet Take 2  tablets (5 mg total) by mouth daily before breakfast.     predniSONE (DELTASONE) 2.5 MG tablet Take 1 tablet (2.5 mg total) by mouth every evening.     sulfamethoxazole-trimethoprim (BACTRIM DS) 800-160 mg per tablet Take 1 tablet by mouth 3 (three) times a week.       Patients Active Problems:  Patient Active Problem List   Diagnosis     Non-small cell lung cancer, unspecified laterality     Metastasis to brain     Hypothyroidism     Malignant neoplasm of lung, unspecified laterality, unspecified part of lung     Shortness of breath     Pulmonary embolism     Palliative care encounter     C. difficile diarrhea     Hypertension     Adrenal insufficiency     Non-small cell lung cancer (NSCLC)     Abdominal pain     Vitamin D deficiency     Acute gangrenous cholecystitis     Chronic respiratory failure with hypoxia     Dehydration     Sepsis, due to unspecified organism     Community acquired pneumonia     Pleuritic chest pain     Cancer associated pain     Fatigue       History:   reports that he has never smoked. He has never used smokeless tobacco. He reports that he drinks alcohol. He reports that he does not use illicit drugs.   reports that he has never smoked. He has never used smokeless tobacco. He reports that he drinks alcohol. He reports that he does not use illicit drugs.  History   Smoking Status     Never Smoker   Smokeless Tobacco     Never Used      reports that he has never smoked. He has never used smokeless tobacco. He reports that he drinks alcohol. He reports that he does not use illicit drugs.  History   Sexual Activity     Sexual activity: No     Past Medical History:   Diagnosis Date     C. difficile colitis 09/2017     Lung cancer      Metastatic cancer      Family History   Problem Relation Age of Onset     Cancer Mother      Breast cancer Mother      No Medical Problems Father      No Medical Problems Brother      No Medical Problems Brother      Past Medical History:   Diagnosis Date     C.  difficile colitis 09/2017     Lung cancer      Metastatic cancer      Past Surgical History:   Procedure Laterality Date     CHOLECYSTECTOMY  07/2017    Laparoscopic     LAPAROSCOPIC CHOLECYSTECTOMY N/A 7/4/2017    Procedure: LAPAROSCOPIC CHOLECYSTECTOMY;  Surgeon: Eduardo Mcconnell MD;  Location: [facility];  Service:      LUNG BIOPSY       PICC  10/26/2016          PORTACATH PLACEMENT      venous port     THYROID SURGERY         Vitals   vitals were not taken for this visit.        Exam  General appearance: The patient looked well, not in acute distress.  Eyes: no evidence of thyroid eye disease.   Retinal exam: No evidence of diabetic retinopathy.  Mouth and Throat: Normal  Neck: No evidence of thyromegaly, enlarged lymph node or tenderness  Chest: Trachea is central. Chest is clear to auscultation and percussion. Breat sounds are normal.  Cardiovascular exam: JVP is not raised. Heart sounds are normal, no murmurs or rub  Peripheral pulses are palpable.   Abdomen: No masses or tenderness.    Back: No vertebral tenderness or kyphosis.  Extremities: No evidence of leg edema.   Skin: Normal to touch.  No abnormal striae  Neurologic exam:  Visual fields are intact by confrontation, grossly intact. No evidence of peripheral neuropathy.  Detailed foot exam normal.        Diagnosis:  No diagnosis found.    Orders:   No orders of the defined types were placed in this encounter.        Assessment and Plan: Adrenal insufficiency the patient was diagnosed with non-small cell lung cancer he is currently undergoing chemotherapy.  The patient has brain metastasis.    He was found to have adrenal insufficiency.    Is currently on prednisone 5 mg once a day in the morning and two-point 5 at night.    He tends to run low sodium 1 34-1 35.    Potassium also tends to run low 3.4-3.5 he is on K-Dur 10 milliequivalents twice a day.    Patient feel fatigued and tired I would put him on fludrocortisone 0.05 mg once a day.    I  did also advise him to continue potassium supplements.    The patient has lost his appetite and he has lost almost 10 pounds.    Hypothyroidism he was on Synthroid 150 mcg TSH was 0.03 the dose was cut down to, 112 mcg TSH now is 1.484 is 1.0.    We will continue with that.    He takes Prilosec as needed.  Creatinine is normal 0.82.  Patient inquired about growth hormone to give him energy and I did advise him against that I also advised against testosterone replacement or DHEA.    Patient return to clinic in 6 month I did spend 40 minutes with the patient more than 50% was spent on counseling and managing his care.

## 2021-06-13 NOTE — PROGRESS NOTES
Date of service:   9/2517    Radiation oncology procedure    Preoperative diagnosis:  Lung cancer metastatic to brain    Postoperative diagnosis: same    Procedure:  Stereotactic CyberKnife therapy, Simple cranial lesion.      Surgeon:  Sara Perez M.D., [facility], [facility]    Indications:  Jack Ricketts was seen in consultation in the [facility] center.  He suffers from a   1. Brain metastases     .  Complete evaluation was done.  I feltHe was an excellent candidate for CyberKnife radio surgery.  I explained the risks and benefits of this procedure.  The patient wishes to proceed ahead.    Findings: I personally contoured and reviewed the fusion between MRI and CT.  I felt the MRI and CT fusion was excellent.  After contouring, I reviewed the physics plan for delivery of radiation.  The patient was to be treated with a total dose of 2400 in 1 treatment session.    Description: Jack Ricketts came to the [facility] center.  He was seen pre-treatment and verification of consent occurred.  The plan was loaded on the console.  Orthogonal x-rays were digitally reconstructed from the images taken during CT sim.  Patient position was verified as accurate after table position shifts occurred.  Treatment commenced and continuous monitoring of table position occurred with corrections as needed.  The patient tolerated the CyberKnife therapy well and left the CyberKnife center in good condition.

## 2021-06-13 NOTE — PROGRESS NOTES
[facility] Hematology and Oncology Progress Note    Patient: Jack Ricketts  MRN: [id number]  Date of Service: 10/18/2017           Reason for visit      1. Non-small cell lung cancer, unspecified laterality    2. Metastasis to brain         Assessment     1. Jack is very pleasant 32 y.o. gentleman with  non-small cell type of a lung cancer with neuroendocrine differentiation initially stage IIIB and later on developed liver metastases, treated with concurrent chemoradiation therapy with cisplatin and etoposide  then given more radiation and weekly carboplatinum and Taxol.  He had recurrence documented February 2014 and has received 6 cycles of topotecan. In December 2014 started on maintenance Avastin.  PET scan in March 2015 showed questionable recurrence in the liver.  In August 2015 Biopsy confirmed NSCLC with neuroendocrine differentiation. Started on Alimta and Avastin.  He developed brain metastases September 2015 while on that.  Treatment changed to topotecan with Avastin.  In January 2016 PET scan showed progression again and at that time he got started on on pembrolizumab.  PET scan in August 2016 showed slight worsening in the right hilar area.  So in August 2016 he started on Opdivo.  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  He was actually on treatment break recovering from pneumonitis that he got admitted at [facility] with pulmonary embolism.  His CT scan showed progression of his lung cancer.  In December 2016 started on carboplatin and etoposide.  3 cycles.  Septic episodes after that needing intubation and pressors.  In the beginning of April 2017 came in with polyarthritis and on prednisone again.  In June 2017 started on ABraxane. Tolerated it well.  In July 2017 he had acute cholecystitis and needed his gall bladder removed.  We delayed his treatment for about a week and then resumed treatment.  So far he has had 3 cycles  of it.  He is having some wheezing and coughing.  He is using nebulizers etc.  He does get some coughing spells which can be pretty distressing for him.  No hemoptysis or blood-tinged sputum normally.    2. Brain metastases which have been treated with CyberKnife l. 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  MRI now showing Left cerebral lesion growing in size and intensity.  Recently in September 2017 had CyberKnife therapy to that..  3. Hypothyroidism due to Pembrolizumab. On replacement.  4. Pulmonary embolism was on Lovenox.  Currently on no anticoagulation.  5. Significant joint pains in the beginning of April 2017.  Better on prednisone.  6. C. difficile colitis diagnosed in April 2017.  7.   Adrenal insufficiency diagnosed in May 2017.    Plan     1. I had a lengthy discussion with Jack regarding his condition.  We will await the report of his tumor tissue for molecular typing on the striata clinical trial.  Theoretically we can use TECCENTRIQ for his treatment although I am somewhat concerned that he may have relapse of his pneumonitis that he had with Opdivo.  I am going to see him in 2-3 weeks time and at that time will make a decision to start therapy.  I did however reassure him that his cancer although progressing is progressing somewhat slowly.  That is given some time to formulate treatment.  However the options are quite limited.  I did stress to him that chemotherapy of any sort does not appear to be an option and immunotherapy are fraught with risk of complications.  My best hope is that something shows up on strata testing that we can treat him with.  If not then TECCENTRIQ is the option.  2. Continue on prednisone  replacement therapy.  I asked him to see endocrinology at least once.  3. Follow-up with Dr. Jackson left cerebellar lesion posttreatment.  4.   Continue with healthy diet.    Stage      Lung Cancer    Primary site: Lung (Right) small cell as well as non-small cell  lung cancer adenocarcinoma    Clinical: Stage IIIA (T2b, N2, M0) signed by Nas Hoyos MD on 9/26/2014  1:17 PM    Summary: Stage IIIA (T2b, N2, M0)      History     Jack Ricketts is a very pleasant 32 y.o. old male with a history of what was thought to be small cell lung cancer, limited stage, in 09/2012 located in the mediastinum measuring 10 cm.  Biopsy confirmed CK7 positive, synaptophysin positive chromogranin expressing cells.  Initial treatment involved chemoradiation therapy with cisplatin and -16.  Post treatment PET scan showed only a minimal response.  Rebiopsy revealed a component of nonsmall cell lung cancer as well, EGFR and ALK-1 mutation negative.  He was referred to Dr. Jose Amezcua [facility] who recommended Taxotere chemotherapy.  The patient opted for radiation therapy (CyberKnife) with radiosensitizing weekly carboplatin and Taxol chemotherapies initiated in June 2013.  He tolerated treatment quite well.  He had complete response to the treatment.       February, 2014, PET scan (compared to prior PET scan in November, 2013) revealed subcarinal node hypermetabolism compatible with active metastasis. This was also biopsied and proven to be malignant and suggestive of small cell type of cancer.  We started him on palliative topotecan chemotherapy day 1 through 5 every 3 weeks.  Cycle one was initiated on 03/04/2014 and he finished 6 cycles of that.  His PET scan after 3 cycles showed minimal response then after 6 cycles his PET scan showed continued but incomplete response.  He did need some transfusion after the fifth cycle.    After couple months break was resumed his treatment with topotecan and Avastin.  He had 3 cycles .  His PET scan after the 2nd cycle had shown complete response.  He started on that his third cycle but could not completed because of respiratory infections.  After that in December 2014 he started on Avastin as a single agent for maintenance.  He has  been on that until March 2015 when PET scan that actually showed a questionable uptake in his liver as well as the subcarinal lymph node.  He got a CT-guided biopsy of the liver lesion which was non diagnostic. At the time of the biopsy they had trouble locating the lesion.  Then in August 2015 the liver lesions got bigger and we biopsied again and came back positive for neuroendocrine carcinoma of lung origin.  He was started on treatment with Alimta and Avastin.  However he developed brain metastases in September.  Treated with CyberKnife therapy.  We change his treatment to topotecan and Avastin.  He has had couple cycles of that.  He became quite anemic and needed transfusion last week.  His brain MRI in the beginning of January 2016 showed improvement.  However his PET scan in January 2016 showed worsening in the liver lesion.    We tested his tumor for PDL-1 mutation and he did have favorable mutation for which pembrolizumab would be a targeted agent. He started on that in the end of January 2016.  He was noted to be hypothyroid after 3 cycles. Started on synthroid. He did notice depression which is better after synthroid. In August 2016 his PET scan showed progression. He was started on Opdivo. He did fine initially but then later on in October he started to have pneumonitis-type of picture.  It was felt to be secondary to Opdivo.    He was put on some steroid Opdivo was stopped.    He was admitted at [facility] second week of December 2016 with shortness of breath.  He was found to have pulmonary embolism as well as his CT scan showed progression of his lung cancer.  We started him on palliative chemotherapy with carboplatin and etoposide in December and he got 3 cycles of it.  He needed Neulasta.  He had a rough time after the third cycle.  He was admitted with pneumonia and other and problems.  In fact he needed to be intubated and was on pressors.  He has recovered from that.  He has been on  observation since late February 2017.    Due to persistent disease on PET scan, started on Abraxane in June. He was admitted with RUQ pain and found to have acute cholecystitis and on July 4th he had his gall bladder removed. Feels better now. Resumed treatment. Noticing increased wheezing off and on.  He has finished 3 cycles of it.  His CT scan in the in September showed that he might not have had any good response to the treatment.  Since that time he has had another episode or 2 where he has admitted in the hospital with pain control issues.  He is also not eating very well.    Most recently Dr. Jackson treated 1 of his brain metastases which seems to be getting worse on the MRI.  Dr. Jackson treated him with a single fraction of radiation by CyberKnife.  He handled the CyberKnife treatment well.  His Abraxane has been on hold since that time.  I did send his tissue for strata testing.    Past Medical History     Bronchitis, lung cancer, renal insufficiency    Family History   Problem Relation Age of Onset     Cancer Mother      Breast cancer Mother      No Medical Problems Father      No Medical Problems Brother      No Medical Problems Brother      Social History   Substance Use Topics     Smoking status: Never Smoker     Smokeless tobacco: Never Used     Alcohol use Yes      Comment: social       Stage      Lung Cancer    Primary site: Lung (Right)    Clinical: Stage IIIA (T2b, N2, M1b) signed by Nas Hoyos MD on 8/20/2015 10:08 AM    Summary: Stage IIIA (T2b, N2, M1b)     Treatment History     1. September 2012 cisplatin and -16 along with concurrent radiation therapy for 2 cycles.  2. Weekly carboplatin and Taxol along with radiation therapy in June 2013.  3. March 2014 topotecan day 1 through 5 every 3 weeks for 6 cycles  4. September 2014 topotecan and Avastin ×3 cycles.  Third cycle was cut Short.  5. December 2014 Avastin 15 mg/kg every 3 weeks maintenance treatment.  6. August 2015 Avastin and  Alimta.  7. Developed brain metastases in 2015 treated with CyberKnife.  8. 2015 started on topotecan with Avastin.  PET scan in 2016 showed progression.  9. 2016 started on pembrolizumab.  10. Opdivo started in August 15, 2016.  11. Carboplatin and etoposide 16 - 17 completed 3 cycles   12.  Abraxane with Avastin 2017-2017. CT shows slight progression.    Review of Systems   Constitutional  Constitutional (WDL): Exceptions to WDL  Fatigue: Fatigue relieved by rest  Neurosensory  Neurosensory (WDL): All neurosensory elements are within defined limits  Cardiovascular  Cardiovascular (WDL): Exceptions to WDL  Pulmonary  Cough: Moderate symptoms, medical intervention indicated, limiting instrumental ADL (yellowish phlegm)  Dyspnea: Shortness of breath with minimal exertion, limiting instrumental ADL    Gastrointestinal  Gastrointestinal (WDL): Exceptions to WDL  Anorexia: Loss of appetite without alteration in eating habits  Diarrhea: Increase of <4 stools per day over baseline, mild increase in ostomy output compared to baseline (occasional loose stools)  Dysphagia: Symptomatic, able to eat regular diet (pills)  Nausea: Loss of appetite without alteration in eating habits  Vomitin - 2 episodes ( by 5 minutes) in 24 hrs  Genitourinary  Genitourinary (WDL): All genitourinary elements are within defined limits  Integumentary  Integumentary (WDL): Exceptions to WDL  Alopecia: Hair loss of >50% normal for that individual that is readily apparent to others, a wig or hair piece is necessary if the patient desires to completely camouflage the hair loss, associated with psychosocial impact  Patient Coping  Patient Coping: Accepting  ECOG Performance   1  Pain Status  Currently in Pain: Yes  Accompanied by  Accompanied by: Alone      Vital Signs     Vitals:    10/18/17 0932   BP: 142/71   Pulse: (!) 120   Temp: 99.3  F (37.4  C)   SpO2: 97%        Physical Exam     Constitutional: Oriented to person, place, and time and appears well-developed.   HEENT:  Normocephalic and atraumatic.  Eyes: Conjunctivae and EOM are normal. Pupils are equal, round, and reactive to light. No discharge.  No scleral icterus. Nose normal. Mouth/Throat: Oropharynx is clear and moist. No oropharyngeal exudate.    NECK: Normal range of motion. Neck supple. No JVD present. No tracheal deviation present.  Scar from surgery and the neck is present.   CARDIOVASCULAR: Elevated heart rate, regular rhythm and intact distal pulses.  Exam reveals no gallop and no friction rub.  Systolic murmur present.  PULMONARY: Effort normal and breath sounds normal. No respiratory distress. He has wheezing and coarse rhonchi b/l.  ABDOMEN: Several ecchymosis areas. Incision looks good. Soft. Bowel sounds are normal. No distension and no mass. Minimal tenderness. There is no rebound and no guarding. No HSM.  MUSCULOSKELETAL: Normal range of motion. No edema and no tenderness. Mild kyphosis, no tenderness.  LYMPH NODES: Has no cervical, supraclavicular, axillary and groin adenopathy.   NEUROLOGICAL: Alert and oriented to person, place, and time. No cranial nerve deficit.  Normal muscle tone. Coordination normal.   GENITOURINARY: Deferred exam.  SKIN: Skin is warm and dry. Rash on back of the neck . No erythema. No pallor.   EXTREMITIES: No cyanosis, no clubbing, no edema. No Deformity.  PSYCHIATRIC: He is slight anxiety.      Lab Results     Results for orders placed or performed in visit on 10/04/17   Comprehensive Metabolic Panel   Result Value Ref Range    Sodium 136 136 - 145 mmol/L    Potassium 3.5 3.5 - 5.0 mmol/L    Chloride 97 (L) 98 - 107 mmol/L    CO2 31 22 - 31 mmol/L    Anion Gap, Calculation 8 5 - 18 mmol/L    Glucose 115 70 - 125 mg/dL    BUN 8 8 - 22 mg/dL    Creatinine 0.98 0.70 - 1.30 mg/dL    GFR MDRD Af Amer >60 >60 mL/min/1.73m2    GFR MDRD Non Af Amer >60 >60 mL/min/1.73m2     Bilirubin, Total 1.1 (H) 0.0 - 1.0 mg/dL    Calcium 9.1 8.5 - 10.5 mg/dL    Protein, Total 6.7 6.0 - 8.0 g/dL    Albumin 2.8 (L) 3.5 - 5.0 g/dL    Alkaline Phosphatase 81 45 - 120 U/L    AST 11 0 - 40 U/L    ALT 10 0 - 45 U/L   HM1 (CBC with Diff)   Result Value Ref Range    WBC 8.0 4.0 - 11.0 thou/uL    RBC 3.53 (L) 4.40 - 6.20 mill/uL    Hemoglobin 12.0 (L) 14.0 - 18.0 g/dL    Hematocrit 34.7 (L) 40.0 - 54.0 %    MCV 98 80 - 100 fL    MCH 34.0 27.0 - 34.0 pg    MCHC 34.6 32.0 - 36.0 g/dL    RDW 14.9 (H) 11.0 - 14.5 %    Platelets 180 140 - 440 thou/uL    MPV 9.4 8.5 - 12.5 fL    Neutrophils % 77 (H) 50 - 70 %    Lymphocytes % 15 (L) 20 - 40 %    Monocytes % 7 2 - 10 %    Eosinophils % 1 0 - 6 %    Basophils % 0 0 - 2 %    Neutrophils Absolute 6.1 2.0 - 7.7 thou/uL    Lymphocytes Absolute 1.2 0.8 - 4.4 thou/uL    Monocytes Absolute 0.6 0.0 - 0.9 thou/uL    Eosinophils Absolute 0.1 0.0 - 0.4 thou/uL    Basophils Absolute 0.0 0.0 - 0.2 thou/uL    reviewed his labs from his recent hospitalization.      Distress Assessment       Distress Assessment Score: 4     Imaging Results       Ct Head Without Contrast    Result Date: 9/18/2017  [facility] CT HEAD WO CONTRAST CT SOFT TISSUE NECK WO CONTRAST 9/18/2017 1:44 PM INDICATION: Secondary malignant neoplasm of brain and spinal cord TECHNIQUE: CT of the head and cervical soft tissues without intravenous contrast performed in treatment position according to the CyberKnife therapy planning protocol. Multiplanar reformats. Dose reduction techniques were used. CONTRAST: None COMPARISON:  Head MRI 08/01/2017. CyberKnife planning CT 08/26/2015 FINDINGS: CT HEAD: Patchy foci of low attenuation at the margin of the right lateral ventricle and extending into right corona radiata white matter corresponding to an enhancing lesion seen on the previous MRI is significant smaller than on the 2015 CT exam. Additional subtle cortical and subcortical low-attenuation along the  right middle frontal gyrus anteriorly also corresponds to a presumed previously treated lesion. Smaller enhancing lesions within the posterior parasagittal left frontal lobe and left middle frontal gyrus may correspond to subtle subcortical low-attenuation changes on axial thin section image 326 and image 317 respectively. These were better visualized on previous MRI end are new compared to more remote CT examination. No significant associated mass effect. No evidence for acute intracranial hemorrhage. The ventricles and sulci are unchanged and relatively age-appropriate. No destructive osseous lesion. Minor mucosal thickening involving ethmoid septa and the inferior right maxillary  sinus. Mastoid air cells are clear. Visualized orbits are grossly unremarkable. CT NECK: Mucosal surfaces of the upper aerodigestive tract are symmetrical and without discrete mass on these noncontrast images. Parapharyngeal and  spaces are grossly unremarkable. The oral cavity is largely obscured by dental amalgam artifacts. Floor mouth structures are grossly symmetrical. The bilateral parotid and submandibular glands are symmetrical and grossly unremarkable. Small anterior chain cervical lymph nodes visualized within the neck. Enlarged superior mediastinal lymph node between the left common carotid and subclavian arteries measuring 2.8 x 2.1 x 1.7 cm in oblique transverse, AP, and craniocaudal dimensions respectively. Partially visualized left internal jugular port catheter. Partially visualized masslike opacity  within the suprahilar right upper lobe lung. The cervical spine is grossly unremarkable for age.     CONCLUSION: 1.  Exam performed for the purposes of CyberKnife radiosurgical planning. 2.  Subtle subcortical low-attenuation changes within the parasagittal left frontal lobe posteriorly and within the left middle frontal gyrus corresponding to presumed metastatic deposits which were better appreciated on the recent  MRI exam. 3.  Additional low-attenuation changes within the periventricular cerebral white matter on the right and within the right middle frontal gyrus at sites of presumed previously treated lesions. 4.  Superior mediastinal lymph node metastasis and partially visualized presumed right perihilar mass. Correlate with findings on prior dedicated chest imaging.     Ct Soft Tissue Neck Without Contrast    Result Date: 9/18/2017  [facility] CT HEAD WO CONTRAST CT SOFT TISSUE NECK WO CONTRAST 9/18/2017 1:44 PM INDICATION: Secondary malignant neoplasm of brain and spinal cord TECHNIQUE: CT of the head and cervical soft tissues without intravenous contrast performed in treatment position according to the CyberKnife therapy planning protocol. Multiplanar reformats. Dose reduction techniques were used. CONTRAST: None COMPARISON:  Head MRI 08/01/2017. CyberKnife planning CT 08/26/2015 FINDINGS: CT HEAD: Patchy foci of low attenuation at the margin of the right lateral ventricle and extending into right corona radiata white matter corresponding to an enhancing lesion seen on the previous MRI is significant smaller than on the 2015 CT exam. Additional subtle cortical and subcortical low-attenuation along the right middle frontal gyrus anteriorly also corresponds to a presumed previously treated lesion. Smaller enhancing lesions within the posterior parasagittal left frontal lobe and left middle frontal gyrus may correspond to subtle subcortical low-attenuation changes on axial thin section image 326 and image 317 respectively. These were better visualized on previous MRI end are new compared to more remote CT examination. No significant associated mass effect. No evidence for acute intracranial hemorrhage. The ventricles and sulci are unchanged and relatively age-appropriate. No destructive osseous lesion. Minor mucosal thickening involving ethmoid septa and the inferior right maxillary  sinus. Mastoid air cells are  clear. Visualized orbits are grossly unremarkable. CT NECK: Mucosal surfaces of the upper aerodigestive tract are symmetrical and without discrete mass on these noncontrast images. Parapharyngeal and  spaces are grossly unremarkable. The oral cavity is largely obscured by dental amalgam artifacts. Floor mouth structures are grossly symmetrical. The bilateral parotid and submandibular glands are symmetrical and grossly unremarkable. Small anterior chain cervical lymph nodes visualized within the neck. Enlarged superior mediastinal lymph node between the left common carotid and subclavian arteries measuring 2.8 x 2.1 x 1.7 cm in oblique transverse, AP, and craniocaudal dimensions respectively. Partially visualized left internal jugular port catheter. Partially visualized masslike opacity  within the suprahilar right upper lobe lung. The cervical spine is grossly unremarkable for age.     CONCLUSION: 1.  Exam performed for the purposes of CyberKnife radiosurgical planning. 2.  Subtle subcortical low-attenuation changes within the parasagittal left frontal lobe posteriorly and within the left middle frontal gyrus corresponding to presumed metastatic deposits which were better appreciated on the recent MRI exam. 3.  Additional low-attenuation changes within the periventricular cerebral white matter on the right and within the right middle frontal gyrus at sites of presumed previously treated lesions. 4.  Superior mediastinal lymph node metastasis and partially visualized presumed right perihilar mass. Correlate with findings on prior dedicated chest imaging.     Mr Brain With Without Contrast    Result Date: 9/18/2017  [facility] HEAD MRI WITHOUT AND WITH IV CONTRAST 9/18/2017 3:11 PM INDICATION: Neoplasm: head, CNS, rx monitor or f/u. TECHNIQUE: Head MRI without and with intravenous contrast. CONTRAST: Gadavist 10 mL COMPARISON:  Multiple prior MRIs most recent which is from 8/1/2017. FINDINGS: No  restricted diffusion to suggest acute infarct. The focal area of nodular and irregular enhancement along the lateral aspect of the body right lateral ventricle is similar in size compared to the most recent MRI may be slightly less robustly enhancing with slightly more internal heterogeneity. Adjacent T2 hyperintensity may be subtly less prominent. The lesion along the outer surface of the right frontal lobe is very similar in appearance compared to the prior study though slightly less robustly enhancing. Adjacent T2 hyperintensity is mildly diminished. A small lesion in the left frontal cortex is similar in size compared to the prior examination measuring approximately 2 to 3 mm. Minimal associated T2 hyperintensity may have subtly diminished. A lesion within the left posterior frontal white matter has slightly enlarged measuring 6 x 7 mm compared to approximately 6.0 x 4.5 mm previously. This demonstrates diffusion restriction as seen on the prior study. No significant edema. No definite new lesion. There is subtle peripheral CBV/perfusion elevation along the periphery of the left frontal white matter lesion. No other definite abnormal elevated CBV/perfusion. No new mass effect. The right frontal and periventricular lesions demonstrate old blood products unchanged compared to the prior study. The calvarium and skull base are unremarkable.  Mild mucosal thickening paranasal sinuses. No fluid level. The mastoid air cells are clear.        CONCLUSION: 1.  The right frontal and periventricular lesions are similar to the prior examination with suggestion of slightly less robust contrast enhancement and minimal reduction in the adjacent/perilesional T2 hyperintensity. 2.  The left posterior frontal white matter lesion demonstrating diffusion restriction has mildly increased in size compared 8/1/2017. Questionable minimal elevated CBV/perfusion. 3.  The additional left anterior frontal cortical lesion is similar in size  to the prior study. 4.  No definite new lesion.     Cta Chest Pe Run    Result Date: 9/21/2017  CTA CHEST PE RUN 9/21/2017 12:18 AM INDICATION: Chest pain. TECHNIQUE: Helical acquisition through the chest was performed during the arterial phase of contrast enhancement using IV contrast. 2D and 3D reconstructions were performed by the CT technologist. Dose reduction techniques were used. IV CONTRAST: Iohexol (Omni) 100 mL. COMPARISON: None. FINDINGS: ANGIOGRAM CHEST: No saddle or central pulmonary emboli. There is narrowing of the right pulmonary artery due to encasement by right hilar mass. No CT evidence of peripheral pulmonary emboli. No aneurysm or dissection involving the thoracic aorta. LUNGS AND PLEURA: No pneumothorax. Small right pleural effusion. Right lung postobstructive infiltrates again noted. Multifocal nodular areas of right lung again demonstrated. Right middle lobe collapse. MEDIASTINUM: Superior mediastinal adenopathy measures 2.7 x 2.3 cm. Right hilar mass with mediastinal extension and subcarinal extension again noted. Radiopaque clips. Minimal amount of pericardial fluid unchanged. Distal periesophageal lymph nodes again  noted. LIMITED UPPER ABDOMEN: Stomach partially filled with gastric contents. Previous cholecystectomy. No adrenal nodule. MUSCULOSKELETAL: Port-A-Cath left anterior chest.     CONCLUSION: 1.  No evidence of pulmonary emboli. 2.  No aneurysm or dissection involving the thoracic aorta. 3.  Right hilar mass with mediastinal extension unchanged. 4.  Mediastinal adenopathy unchanged. 5.  Small right pleural effusion unchanged. 6.  Right middle lobe collapse. 7.  Right lung postobstructive change and nodules unchanged. 8.  Minimal pericardial effusion.    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 Multiplanar reformats. Dose reduction techniques were used. CONTRAST: None COMPARISON:  Head MRI 08/01/2017. [facility] planning CT 08/26/2015 FINDINGS: CT HEAD: Patchy foci of low attenuation at the margin of the right lateral ventricle and extending into right corona radiata white matter corresponding to an enhancing lesion seen on the previous MRI is significant smaller than on the 2015 CT exam. Additional subtle cortical and subcortical low-attenuation along the right middle frontal gyrus anteriorly also corresponds to a presumed previously treated lesion. Smaller enhancing lesions within the posterior parasagittal left frontal lobe and left middle frontal gyrus may correspond to subtle subcortical low-attenuation changes on axial thin section image 326 and image 317 respectively. These were better visualized on previous MRI end are new compared to more remote CT examination. No significant associated mass effect. No evidence for acute intracranial hemorrhage. The ventricles and sulci are unchanged and relatively age-appropriate. No destructive osseous lesion. Minor mucosal thickening involving ethmoid septa and the inferior right maxillary  sinus. Mastoid air cells are clear. Visualized orbits are grossly unremarkable. CT NECK: Mucosal surfaces of the upper aerodigestive tract are symmetrical and without discrete mass on these noncontrast images. Parapharyngeal and  spaces are grossly unremarkable. The oral cavity is largely obscured by dental amalgam artifacts. Floor mouth structures are grossly symmetrical. The bilateral parotid and submandibular glands are symmetrical and grossly unremarkable. Small anterior chain cervical lymph nodes visualized within the neck. Enlarged superior mediastinal lymph node between the left common carotid and subclavian  arteries measuring 2.8 x 2.1 x 1.7 cm in oblique transverse, AP, and craniocaudal dimensions respectively. Partially visualized left internal jugular port catheter. Partially visualized masslike opacity  within the suprahilar right upper lobe lung. The cervical spine is grossly unremarkable for age.     CONCLUSION: 1.  Exam performed for the purposes of [employer] radiosurgical planning. 2.  Subtle subcortical low-attenuation changes within the parasagittal left frontal lobe posteriorly and within the left middle frontal gyrus corresponding to presumed metastatic deposits which were better appreciated on the recent MRI exam. 3.  Additional low-attenuation changes within the periventricular cerebral white matter on the right and within the right middle frontal gyrus at sites of presumed previously treated lesions. 4.  Superior mediastinal lymph node metastasis and partially visualized presumed right perihilar mass. Correlate with findings on prior dedicated chest imaging.       Signed by: Nas Hoyos MD

## 2021-06-13 NOTE — PROGRESS NOTES
Confirmed pt is on prednisone 5mg in am and 2.5mg pm. He does have a fairly rough cough at this time so he's sucking on a cough drop currently prior to tx. Dr. Jackson to see patient pre-tx for OTV. Minimal anterior chest pain, no headaches.

## 2021-06-13 NOTE — PROGRESS NOTES
[facility] Hematology and Oncology Progress Note    Patient: Jack Ricketts  MRN: [id number]  Date of Service: 10/4/2017           Reason for visit      1. Non-small cell lung cancer, unspecified laterality    2. Malignant neoplasm of lung, unspecified laterality, unspecified part of lung    3. Pulmonary embolism         Assessment     1. Jack is very pleasant 32 y.o. gentleman with  non-small cell type of a lung cancer with neuroendocrine differentiation initially stage IIIB and later on developed liver metastases, treated with concurrent chemoradiation therapy with cisplatin and etoposide  then given more radiation and weekly carboplatinum and Taxol.  He had recurrence documented February 2014 and has received 6 cycles of topotecan. In December 2014 started on maintenance Avastin.  PET scan in March 2015 showed questionable recurrence in the liver.  In August 2015 Biopsy confirmed NSCLC with neuroendocrine differentiation. Started on Alimta and Avastin.  He developed brain metastases September 2015 while on that.  Treatment changed to topotecan with Avastin.  In January 2016 PET scan showed progression again and at that time he got started on on pembrolizumab.  PET scan in August 2016 showed slight worsening in the right hilar area.  So in August 2016 he started on Opdivo.  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  He was actually on treatment break recovering from pneumonitis that he got admitted at [facility] with pulmonary embolism.  His CT scan showed progression of his lung cancer.  In December 2016 started on carboplatin and etoposide.  3 cycles.  Septic episodes after that needing intubation and pressors.  In the beginning of April 2017 came in with polyarthritis and on prednisone again.  In June 2017 started on ABraxane. Tolerated it well.  In July 2017 he had acute cholecystitis and needed his gall bladder removed.  We delayed his  treatment for about a week and then resumed treatment.  So far he has had 3 cycles of it.  He is having some wheezing.  He is using nebulizers etc.  Some days his wheezing does get better.  2. Brain metastases which have been treated with CyberKnife l. 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  MRI now showing Left cerebral lesion growing in size and intensity.  Recently in September 2017 had CyberKnife therapy to that..  3. Hypothyroidism due to Pembrolizumab. On replacement.  4. Pulmonary embolism was on Lovenox.  Currently on no anticoagulation.  5. Significant joint pains in the beginning of April 2017.  Better on prednisone.  6. C. difficile colitis diagnosed in April 2017.  7.   Adrenal insufficiency diagnosed in May 2017.    Plan     1. I had a lengthy discussion with Jack regarding his condition.  I am not sure that he is getting much help from Abraxane and Avastin chemotherapy combination.  He feels the same way as well.  At this time I have decided to stop his treatment and check some more molecular marker on his tumor tissue.  We will also send his tumor tissue for molecular typing on the striata clinical trial.  Theoretically we can use TECCENTRIQ for his treatment although I am somewhat concerned that he may have relapse of his pneumonitis that he had with Opdivo.  2. Continue on prednisone  replacement therapy.  3. Follow-up with Dr. Jackson left cerebellar lesion posttreatment.  4.   Continue with healthy diet.  5. I also asked him to keep a diary of his respiratory symptoms.  I want to see if his respiratory symptoms are getting worse in between the chemotherapy treatments.    Stage      Lung Cancer    Primary site: Lung (Right) small cell as well as non-small cell lung cancer adenocarcinoma    Clinical: Stage IIIA (T2b, N2, M0) signed by Nas Hoyos MD on 9/26/2014  1:17 PM    Summary: Stage IIIA (T2b, N2, M0)      History     Jack Ricketts is a very pleasant 32 y.o. old male  with a history of what was thought to be small cell lung cancer, limited stage, in 09/2012 located in the mediastinum measuring 10 cm.  Biopsy confirmed CK7 positive, synaptophysin positive chromogranin expressing cells.  Initial treatment involved chemoradiation therapy with cisplatin and -16.  Post treatment PET scan showed only a minimal response.  Rebiopsy revealed a component of nonsmall cell lung cancer as well, EGFR and ALK-1 mutation negative.  He was referred to Dr. Jose Amezcua [facility] who recommended Taxotere chemotherapy.  The patient opted for radiation therapy (CyberKnife) with radiosensitizing weekly carboplatin and Taxol chemotherapies initiated in June 2013.  He tolerated treatment quite well.  He had complete response to the treatment.       February, 2014, PET scan (compared to prior PET scan in November, 2013) revealed subcarinal node hypermetabolism compatible with active metastasis. This was also biopsied and proven to be malignant and suggestive of small cell type of cancer.  We started him on palliative topotecan chemotherapy day 1 through 5 every 3 weeks.  Cycle one was initiated on 03/04/2014 and he finished 6 cycles of that.  His PET scan after 3 cycles showed minimal response then after 6 cycles his PET scan showed continued but incomplete response.  He did need some transfusion after the fifth cycle.    After couple months break was resumed his treatment with topotecan and Avastin.  He had 3 cycles .  His PET scan after the 2nd cycle had shown complete response.  He started on that his third cycle but could not completed because of respiratory infections.  After that in December 2014 he started on Avastin as a single agent for maintenance.  He has been on that until March 2015 when PET scan that actually showed a questionable uptake in his liver as well as the subcarinal lymph node.  He got a CT-guided biopsy of the liver lesion which was non diagnostic. At the time of  the biopsy they had trouble locating the lesion.  Then in August 2015 the liver lesions got bigger and we biopsied again and came back positive for neuroendocrine carcinoma of lung origin.  He was started on treatment with Alimta and Avastin.  However he developed brain metastases in September.  Treated with CyberKnife therapy.  We change his treatment to topotecan and Avastin.  He has had couple cycles of that.  He became quite anemic and needed transfusion last week.  His brain MRI in the beginning of January 2016 showed improvement.  However his PET scan in January 2016 showed worsening in the liver lesion.    We tested his tumor for PDL-1 mutation and he did have favorable mutation for which pembrolizumab would be a targeted agent. He started on that in the end of January 2016.  He was noted to be hypothyroid after 3 cycles. Started on synthroid. He did notice depression which is better after synthroid. In August 2016 his PET scan showed progression. He was started on Opdivo. He did fine initially but then later on in October he started to have pneumonitis-type of picture.  It was felt to be secondary to Opdivo.    He was put on some steroid Opdivo was stopped.    He was admitted at [facility] second week of December 2016 with shortness of breath.  He was found to have pulmonary embolism as well as his CT scan showed progression of his lung cancer.  We started him on palliative chemotherapy with carboplatin and etoposide in December and he got 3 cycles of it.  He needed Neulasta.  He had a rough time after the third cycle.  He was admitted with pneumonia and other and problems.  In fact he needed to be intubated and was on pressors.  He has recovered from that.  He has been on observation since late February 2017.    Due to persistent disease on PET scan, started on Abraxane in June. He was admitted with RUQ pain and found to have acute cholecystitis and on July 4th he had his gall bladder removed. Feels  better now. Resumed treatment. Noticing increased wheezing off and on.  He has finished 3 cycles of it.  His CT scan in the in September showed that he might not have had any good response to the treatment.  Since that time he has had another episode or 2 where he has admitted in the hospital with pain control issues.  He is also not eating very well.    Most recently Dr. Jackson treated 1 of his brain metastases which seems to be getting worse on the MRI.  He handled the CyberKnife treatment well.    Past Medical History     Bronchitis, lung cancer, renal insufficiency    Family History   Problem Relation Age of Onset     Cancer Mother      Breast cancer Mother      No Medical Problems Father      No Medical Problems Brother      No Medical Problems Brother      Social History   Substance Use Topics     Smoking status: Never Smoker     Smokeless tobacco: Never Used     Alcohol use Yes      Comment: social       Stage      Lung Cancer    Primary site: Lung (Right)    Clinical: Stage IIIA (T2b, N2, M1b) signed by Nas Hoyos MD on 8/20/2015 10:08 AM    Summary: Stage IIIA (T2b, N2, M1b)     Treatment History     1. September 2012 cisplatin and -16 along with concurrent radiation therapy for 2 cycles.  2. Weekly carboplatin and Taxol along with radiation therapy in June 2013.  3. March 2014 topotecan day 1 through 5 every 3 weeks for 6 cycles  4. September 2014 topotecan and Avastin ×3 cycles.  Third cycle was cut Short.  5. December 2014 Avastin 15 mg/kg every 3 weeks maintenance treatment.  6. August 2015 Avastin and Alimta.  7. Developed brain metastases in September 2015 treated with CyberKnife.  8. November 2015 started on topotecan with Avastin.  PET scan in January 2016 showed progression.  9. February 2016 started on pembrolizumab.  10. Opdivo started in August 15, 2016.  11. Carboplatin and etoposide 12/05/16 - 02/20/17 completed 3 cycles   12.  Abraxane with Avastin June 2017-September 2017. CT shows  slight progression.    Review of Systems   Constitutional  Constitutional (WDL): Exceptions to WDL  Fatigue: Fatigue not relieved by rest - Limiting instrumental ADL (sleeplessness)  Neurosensory  Neurosensory (WDL): All neurosensory elements are within defined limits  Cardiovascular  Cardiovascular (WDL): Exceptions to WDL  Pulmonary  Cough: Moderate symptoms, medical intervention indicated, limiting instrumental ADL  Dyspnea: Shortness of breath with minimal exertion, limiting instrumental ADL    Gastrointestinal  Gastrointestinal (WDL): Exceptions to WDL  Anorexia: Loss of appetite without alteration in eating habits  Dysphagia: Symptomatic, able to eat regular diet (pills)  Nausea: Loss of appetite without alteration in eating habits  Vomitin - 2 episodes ( by 5 minutes) in 24 hrs  Genitourinary  Genitourinary (WDL): All genitourinary elements are within defined limits  Integumentary  Integumentary (WDL): Exceptions to WDL  Alopecia: Hair loss of >50% normal for that individual that is readily apparent to others, a wig or hair piece is necessary if the patient desires to completely camouflage the hair loss, associated with psychosocial impact  Patient Coping  Patient Coping: Accepting  ECOG Performance   1  Pain Status  Currently in Pain: Yes  Accompanied by  Accompanied by: Alone      Vital Signs     Vitals:    10/04/17 1131   BP: 112/78   Pulse: (!) 107   Temp: 99.1  F (37.3  C)   SpO2: 96%       Physical Exam     Constitutional: Oriented to person, place, and time and appears well-developed.   HEENT:  Normocephalic and atraumatic.  Eyes: Conjunctivae and EOM are normal. Pupils are equal, round, and reactive to light. No discharge.  No scleral icterus.  Nose normal. Mouth/Throat: Oropharynx is clear and moist. No oropharyngeal exudate.    NECK: Normal range of motion. Neck supple. No JVD present. No tracheal deviation present.  Scar from surgery and the neck is present.   CARDIOVASCULAR: Elevated  heart rate, regular rhythm and intact distal pulses.  Exam reveals no gallop and no friction rub.  Systolic murmur present.  PULMONARY: Effort normal and breath sounds normal. No respiratory distress. He has wheezing and coarse rhonchi b/l.  ABDOMEN: Several ecchymosis areas. Incision looks good. Soft. Bowel sounds are normal. No distension and no mass. Minimal tenderness. There is no rebound and no guarding. No HSM.  MUSCULOSKELETAL: Normal range of motion. No edema and no tenderness. Mild kyphosis, no tenderness.  LYMPH NODES: Has no cervical, supraclavicular, axillary and groin adenopathy.   NEUROLOGICAL: Alert and oriented to person, place, and time. No cranial nerve deficit.  Normal muscle tone. Coordination normal.   GENITOURINARY: Deferred exam.  SKIN: Skin is warm and dry. Rash on back of the neck . No erythema. No pallor.   EXTREMITIES: No cyanosis, no clubbing, no edema. No Deformity.  PSYCHIATRIC: He is slight anxiety.      Lab Results     Results for orders placed or performed in visit on 10/04/17   Comprehensive Metabolic Panel   Result Value Ref Range    Sodium 136 136 - 145 mmol/L    Potassium 3.5 3.5 - 5.0 mmol/L    Chloride 97 (L) 98 - 107 mmol/L    CO2 31 22 - 31 mmol/L    Anion Gap, Calculation 8 5 - 18 mmol/L    Glucose 115 70 - 125 mg/dL    BUN 8 8 - 22 mg/dL    Creatinine 0.98 0.70 - 1.30 mg/dL    GFR MDRD Af Amer >60 >60 mL/min/1.73m2    GFR MDRD Non Af Amer >60 >60 mL/min/1.73m2    Bilirubin, Total 1.1 (H) 0.0 - 1.0 mg/dL    Calcium 9.1 8.5 - 10.5 mg/dL    Protein, Total 6.7 6.0 - 8.0 g/dL    Albumin 2.8 (L) 3.5 - 5.0 g/dL    Alkaline Phosphatase 81 45 - 120 U/L    AST 11 0 - 40 U/L    ALT 10 0 - 45 U/L   HM1 (CBC with Diff)   Result Value Ref Range    WBC 8.0 4.0 - 11.0 thou/uL    RBC 3.53 (L) 4.40 - 6.20 mill/uL    Hemoglobin 12.0 (L) 14.0 - 18.0 g/dL    Hematocrit 34.7 (L) 40.0 - 54.0 %    MCV 98 80 - 100 fL    MCH 34.0 27.0 - 34.0 pg    MCHC 34.6 32.0 - 36.0 g/dL    RDW 14.9 (H) 11.0 -  14.5 %    Platelets 180 140 - 440 thou/uL    MPV 9.4 8.5 - 12.5 fL    Neutrophils % 77 (H) 50 - 70 %    Lymphocytes % 15 (L) 20 - 40 %    Monocytes % 7 2 - 10 %    Eosinophils % 1 0 - 6 %    Basophils % 0 0 - 2 %    Neutrophils Absolute 6.1 2.0 - 7.7 thou/uL    Lymphocytes Absolute 1.2 0.8 - 4.4 thou/uL    Monocytes Absolute 0.6 0.0 - 0.9 thou/uL    Eosinophils Absolute 0.1 0.0 - 0.4 thou/uL    Basophils Absolute 0.0 0.0 - 0.2 thou/uL    reviewed his labs from his recent hospitalization.      Distress Assessment       Distress Assessment Score: 4     Imaging Results       Xr Chest Ap Portable    Result Date: 9/14/2017  XR CHEST AP PORTABLE 9/14/2017 3:54 PM INDICATION: fever COMPARISON: CT 8/30/2017 FINDINGS: Port-A-Cath tip in the SVC. Increased opacity right lung base similar to prior CT. On CT this is primarily due to pleural thickening and central perihilar mass with atelectasis. Findings are stable. Fiducial markers in place. No acute new findings.    Ct Head Without Contrast    Result Date: 9/18/2017  [facility] CT HEAD WO CONTRAST CT SOFT TISSUE NECK WO CONTRAST 9/18/2017 1:44 PM INDICATION: Secondary malignant neoplasm of brain and spinal cord TECHNIQUE: CT of the head and cervical soft tissues without intravenous contrast performed in treatment position according to the CyberKnife therapy planning protocol. Multiplanar reformats. Dose reduction techniques were used. CONTRAST: None COMPARISON:  Head MRI 08/01/2017. CyberKnife planning CT 08/26/2015 FINDINGS: CT HEAD: Patchy foci of low attenuation at the margin of the right lateral ventricle and extending into right corona radiata white matter corresponding to an enhancing lesion seen on the previous MRI is significant smaller than on the 2015 CT exam. Additional subtle cortical and subcortical low-attenuation along the right middle frontal gyrus anteriorly also corresponds to a presumed previously treated lesion. Smaller enhancing lesions within  the posterior parasagittal left frontal lobe and left middle frontal gyrus may correspond to subtle subcortical low-attenuation changes on axial thin section image 326 and image 317 respectively. These were better visualized on previous MRI end are new compared to more remote CT examination. No significant associated mass effect. No evidence for acute intracranial hemorrhage. The ventricles and sulci are unchanged and relatively age-appropriate. No destructive osseous lesion. Minor mucosal thickening involving ethmoid septa and the inferior right maxillary  sinus. Mastoid air cells are clear. Visualized orbits are grossly unremarkable. CT NECK: Mucosal surfaces of the upper aerodigestive tract are symmetrical and without discrete mass on these noncontrast images. Parapharyngeal and  spaces are grossly unremarkable. The oral cavity is largely obscured by dental amalgam artifacts. Floor mouth structures are grossly symmetrical. The bilateral parotid and submandibular glands are symmetrical and grossly unremarkable. Small anterior chain cervical lymph nodes visualized within the neck. Enlarged superior mediastinal lymph node between the left common carotid and subclavian arteries measuring 2.8 x 2.1 x 1.7 cm in oblique transverse, AP, and craniocaudal dimensions respectively. Partially visualized left internal jugular port catheter. Partially visualized masslike opacity  within the suprahilar right upper lobe lung. The cervical spine is grossly unremarkable for age.     CONCLUSION: 1.  Exam performed for the purposes of CyberKnife radiosurgical planning. 2.  Subtle subcortical low-attenuation changes within the parasagittal left frontal lobe posteriorly and within the left middle frontal gyrus corresponding to presumed metastatic deposits which were better appreciated on the recent MRI exam. 3.  Additional low-attenuation changes within the periventricular cerebral white matter on the right and within the  right middle frontal gyrus at sites of presumed previously treated lesions. 4.  Superior mediastinal lymph node metastasis and partially visualized presumed right perihilar mass. Correlate with findings on prior dedicated chest imaging.     Ct Soft Tissue Neck Without Contrast    Result Date: 9/18/2017  [facility] CT HEAD WO CONTRAST CT SOFT TISSUE NECK WO CONTRAST 9/18/2017 1:44 PM INDICATION: Secondary malignant neoplasm of brain and spinal cord TECHNIQUE: CT of the head and cervical soft tissues without intravenous contrast performed in treatment position according to the CyberKnife therapy planning protocol. Multiplanar reformats. Dose reduction techniques were used. CONTRAST: None COMPARISON:  Head MRI 08/01/2017. CyberKnife planning CT 08/26/2015 FINDINGS: CT HEAD: Patchy foci of low attenuation at the margin of the right lateral ventricle and extending into right corona radiata white matter corresponding to an enhancing lesion seen on the previous MRI is significant smaller than on the 2015 CT exam. Additional subtle cortical and subcortical low-attenuation along the right middle frontal gyrus anteriorly also corresponds to a presumed previously treated lesion. Smaller enhancing lesions within the posterior parasagittal left frontal lobe and left middle frontal gyrus may correspond to subtle subcortical low-attenuation changes on axial thin section image 326 and image 317 respectively. These were better visualized on previous MRI end are new compared to more remote CT examination. No significant associated mass effect. No evidence for acute intracranial hemorrhage. The ventricles and sulci are unchanged and relatively age-appropriate. No destructive osseous lesion. Minor mucosal thickening involving ethmoid septa and the inferior right maxillary  sinus. Mastoid air cells are clear. Visualized orbits are grossly unremarkable. CT NECK: Mucosal surfaces of the upper aerodigestive tract are symmetrical  and without discrete mass on these noncontrast images. Parapharyngeal and  spaces are grossly unremarkable. The oral cavity is largely obscured by dental amalgam artifacts. Floor mouth structures are grossly symmetrical. The bilateral parotid and submandibular glands are symmetrical and grossly unremarkable. Small anterior chain cervical lymph nodes visualized within the neck. Enlarged superior mediastinal lymph node between the left common carotid and subclavian arteries measuring 2.8 x 2.1 x 1.7 cm in oblique transverse, AP, and craniocaudal dimensions respectively. Partially visualized left internal jugular port catheter. Partially visualized masslike opacity  within the suprahilar right upper lobe lung. The cervical spine is grossly unremarkable for age.     CONCLUSION: 1.  Exam performed for the purposes of CyberKnife radiosurgical planning. 2.  Subtle subcortical low-attenuation changes within the parasagittal left frontal lobe posteriorly and within the left middle frontal gyrus corresponding to presumed metastatic deposits which were better appreciated on the recent MRI exam. 3.  Additional low-attenuation changes within the periventricular cerebral white matter on the right and within the right middle frontal gyrus at sites of presumed previously treated lesions. 4.  Superior mediastinal lymph node metastasis and partially visualized presumed right perihilar mass. Correlate with findings on prior dedicated chest imaging.     Mr Brain With Without Contrast    Result Date: 9/18/2017  [facility] HEAD MRI WITHOUT AND WITH IV CONTRAST 9/18/2017 3:11 PM INDICATION: Neoplasm: head, CNS, rx monitor or f/u. TECHNIQUE: Head MRI without and with intravenous contrast. CONTRAST: Gadavist 10 mL COMPARISON:  Multiple prior MRIs most recent which is from 8/1/2017. FINDINGS: No restricted diffusion to suggest acute infarct. The focal area of nodular and irregular enhancement along the lateral aspect of  the body right lateral ventricle is similar in size compared to the most recent MRI may be slightly less robustly enhancing with slightly more internal heterogeneity. Adjacent T2 hyperintensity may be subtly less prominent. The lesion along the outer surface of the right frontal lobe is very similar in appearance compared to the prior study though slightly less robustly enhancing. Adjacent T2 hyperintensity is mildly diminished. A small lesion in the left frontal cortex is similar in size compared to the prior examination measuring approximately 2 to 3 mm. Minimal associated T2 hyperintensity may have subtly diminished. A lesion within the left posterior frontal white matter has slightly enlarged measuring 6 x 7 mm compared to approximately 6.0 x 4.5 mm previously. This demonstrates diffusion restriction as seen on the prior study. No significant edema. No definite new lesion. There is subtle peripheral CBV/perfusion elevation along the periphery of the left frontal white matter lesion. No other definite abnormal elevated CBV/perfusion. No new mass effect. The right frontal and periventricular lesions demonstrate old blood products unchanged compared to the prior study. The calvarium and skull base are unremarkable.  Mild mucosal thickening paranasal sinuses. No fluid level. The mastoid air cells are clear.        CONCLUSION: 1.  The right frontal and periventricular lesions are similar to the prior examination with suggestion of slightly less robust contrast enhancement and minimal reduction in the adjacent/perilesional T2 hyperintensity. 2.  The left posterior frontal white matter lesion demonstrating diffusion restriction has mildly increased in size compared 8/1/2017. Questionable minimal elevated CBV/perfusion. 3.  The additional left anterior frontal cortical lesion is similar in size to the prior study. 4.  No definite new lesion.     Cta Chest Pe Run    Result Date: 9/21/2017  CTA CHEST PE RUN 9/21/2017  12:18 AM INDICATION: Chest pain. TECHNIQUE: Helical acquisition through the chest was performed during the arterial phase of contrast enhancement using IV contrast. 2D and 3D reconstructions were performed by the CT technologist. Dose reduction techniques were used. IV CONTRAST: Iohexol (Omni) 100 mL. COMPARISON: None. FINDINGS: ANGIOGRAM CHEST: No saddle or central pulmonary emboli. There is narrowing of the right pulmonary artery due to encasement by right hilar mass. No CT evidence of peripheral pulmonary emboli. No aneurysm or dissection involving the thoracic aorta. LUNGS AND PLEURA: No pneumothorax. Small right pleural effusion. Right lung postobstructive infiltrates again noted. Multifocal nodular areas of right lung again demonstrated. Right middle lobe collapse. MEDIASTINUM: Superior mediastinal adenopathy measures 2.7 x 2.3 cm. Right hilar mass with mediastinal extension and subcarinal extension again noted. Radiopaque clips. Minimal amount of pericardial fluid unchanged. Distal periesophageal lymph nodes again  noted. LIMITED UPPER ABDOMEN: Stomach partially filled with gastric contents. Previous cholecystectomy. No adrenal nodule. MUSCULOSKELETAL: Port-A-Cath left anterior chest.     CONCLUSION: 1.  No evidence of pulmonary emboli. 2.  No aneurysm or dissection involving the thoracic aorta. 3.  Right hilar mass with mediastinal extension unchanged. 4.  Mediastinal adenopathy unchanged. 5.  Small right pleural effusion unchanged. 6.  Right middle lobe collapse. 7.  Right lung postobstructive change and nodules unchanged. 8.  Minimal pericardial effusion.    Cta Chest [facility]    Result Date: 9/14/2017  CTA CHEST PE RUN, CT ABDOMEN PELVIS WO ORAL W IV CONTRAST 9/14/2017 4:46 PM INDICATION: Chest pain, acute, PE suspected. Lung cancer. Fever. Abdominal pain. TECHNIQUE: Helical acquisition through the chest was performed during the arterial phase of contrast enhancement using IV contrast. 2D and 3D  reconstructions were performed by the CT technologist. Dose reduction techniques were used. IV CONTRAST: Iohexol (Omni) 100 mL COMPARISON: 8/30/2017 FINDINGS: ANGIOGRAM CHEST: Negative for pulmonary emboli. There is mild extrinsic narrowing of the right-sided pulmonary vessels, which is unchanged. The main pulmonary artery is enlarged at 34 mm in diameter. Negative for thoracic aortic dissection and aneurysms. LUNGS AND PLEURA: A right hilar mass is again seen. This is 6. 9 x 3.2 cm on the current study (previously measured at 6.4 x 3.27 m; series 4 image 115). This causes extrinsic narrowing of the right-sided ulnar arteries, airways, and pulmonary veins; the  right lower pulmonary vein does not appear patent. Peripheral nodular opacities, reticular interstitial opacities, and background groundglass have increased from the comparison study. Right pleural fluid has slightly increased. Right middle lobe collapse is again noted. A tubular nodular opacity in the superior segment of the left lower lobe is again seen and likely represents mucous plugging. There is a new groundglass opacity in the left lower lobe which is nonspecific (series 5 image 82). Interlobular septal thickening has increased in both lungs. MEDIASTINUM: Mediastinal lymph nodes are again noted. Lymph nodes adjacent to the lower esophagus measuring 11 mm previously measured 8 mm. A left chest wall Port-A-Cath tip terminates in the right atrium. ABDOMEN: The spleen is mildly enlarged. A few hypoattenuating hepatic lesions are again seen without significant change. Status post cholecystectomy. No biliary ductal dilatation. Normal pancreas. Normal adrenal glands. Normal kidneys and ureters. Normal  stomach. Normal caliber of the small bowel. No intra-abdominal abscess identified. PELVIS: The urinary bladder is mildly thick-walled, which may be related to underdistention. Normal prostate gland and seminal vesicles. There are air-fluid levels in the colon  I which is unchanged. No acute appendicitis. 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     CONCLUSION: 1.  No PE. 2.  The right hilar lung mass has slightly increased in size. There is increased opacity in the right lung, which could represent an increased postobstructive process. Lymphatic and venous obstruction is suspected. Lymphangitic carcinomatosis is not excluded. 3.  Slightly increased interstitial edema bilaterally is likely related to volume status. 4.  No intra-abdominal abscess. 5.  Mild wall thickening of the urinary bladder may be related to underdistention. Consider cystitis. 6.  Hypoattenuating hepatic lesions are incompletely characterized. MRI follow-up is recommended.    Ct Abdomen Pelvis Without Oral With Iv Contrast    Result Date: 9/14/2017  CTA CHEST PE RUN, CT ABDOMEN PELVIS WO ORAL W IV CONTRAST 9/14/2017 4:46 PM INDICATION: Chest pain, acute, PE suspected. Lung cancer. Fever. Abdominal pain. TECHNIQUE: Helical acquisition through the chest was performed during the arterial phase of contrast enhancement using IV contrast. 2D and 3D reconstructions were performed by the CT technologist. Dose reduction techniques were used. IV CONTRAST: Iohexol (Omni) 100 mL COMPARISON: 8/30/2017 FINDINGS: ANGIOGRAM CHEST: Negative for pulmonary emboli. There is mild extrinsic narrowing of the right-sided pulmonary vessels, which is unchanged. The main pulmonary artery is enlarged at 34 mm in diameter. Negative for thoracic aortic dissection and aneurysms. LUNGS AND PLEURA: A right hilar mass is again seen. This is 6. 9 x 3.2 cm on the current study (previously measured at 6.4 x 3.27 m; series 4 image 115). This causes extrinsic narrowing of the right-sided ulnar arteries, airways, and pulmonary veins; the  right lower pulmonary vein does not appear patent. Peripheral nodular opacities, reticular interstitial opacities, and background groundglass have increased from the comparison study. Right  pleural fluid has slightly increased. Right middle lobe collapse is again noted. A tubular nodular opacity in the superior segment of the left lower lobe is again seen and likely represents mucous plugging. There is a new groundglass opacity in the left lower lobe which is nonspecific (series 5 image 82). Interlobular septal thickening has increased in both lungs. MEDIASTINUM: Mediastinal lymph nodes are again noted. Lymph nodes adjacent to the lower esophagus measuring 11 mm previously measured 8 mm. A left chest wall Port-A-Cath tip terminates in the right atrium. ABDOMEN: The spleen is mildly enlarged. A few hypoattenuating hepatic lesions are again seen without significant change. Status post cholecystectomy. No biliary ductal dilatation. Normal pancreas. Normal adrenal glands. Normal kidneys and ureters. Normal  stomach. Normal caliber of the small bowel. No intra-abdominal abscess identified. PELVIS: The urinary bladder is mildly thick-walled, which may be related to underdistention. Normal prostate gland and seminal vesicles. There are air-fluid levels in the colon I which is unchanged. No acute appendicitis. 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     CONCLUSION: 1.  No PE. 2.  The right hilar lung mass has slightly increased in size. There is increased opacity in the right lung, which could represent an increased postobstructive process. Lymphatic and venous obstruction is suspected. Lymphangitic carcinomatosis is not excluded. 3.  Slightly increased interstitial edema bilaterally is likely related to volume status. 4.  No intra-abdominal abscess. 5.  Mild wall thickening of the urinary bladder may be related to underdistention. Consider cystitis. 6.  Hypoattenuating hepatic lesions are incompletely characterized. MRI follow-up is recommended.    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 Multiplanar reformats. Dose reduction techniques were used. CONTRAST: None COMPARISON:  Head MRI 08/01/2017. [facility] planning CT 08/26/2015 FINDINGS: CT HEAD: Patchy foci of low attenuation at the margin of the right lateral ventricle and extending into right corona radiata white matter corresponding to an enhancing lesion seen on the previous MRI is significant smaller than on the 2015 CT exam. Additional subtle cortical and subcortical low-attenuation along the right middle frontal gyrus anteriorly also corresponds to a presumed previously treated lesion. Smaller enhancing lesions within the posterior parasagittal left frontal lobe and left middle frontal gyrus may correspond to subtle subcortical low-attenuation changes on axial thin section image 326 and image 317 respectively. These were better visualized on previous MRI end are new compared to more remote CT examination. No significant associated mass effect. No evidence for acute intracranial hemorrhage. The ventricles and sulci are unchanged and relatively age-appropriate. No destructive osseous lesion. Minor mucosal thickening involving ethmoid septa and the inferior right maxillary  sinus. Mastoid air cells are clear. Visualized orbits are grossly unremarkable. CT NECK: Mucosal surfaces of the upper aerodigestive tract are symmetrical and without discrete mass on these noncontrast images. Parapharyngeal and  spaces are grossly unremarkable. The oral cavity is largely obscured by dental amalgam artifacts. Floor mouth structures are grossly symmetrical. The bilateral parotid and submandibular glands are symmetrical and grossly unremarkable. Small anterior chain cervical lymph nodes visualized within the  neck. Enlarged superior mediastinal lymph node between the left common carotid and subclavian arteries measuring 2.8 x 2.1 x 1.7 cm in oblique transverse, AP, and craniocaudal dimensions respectively. Partially visualized left internal jugular port catheter. Partially visualized masslike opacity  within the suprahilar right upper lobe lung. The cervical spine is grossly unremarkable for age.     CONCLUSION: 1.  Exam performed for the purposes of [employer] radiosurgical planning. 2.  Subtle subcortical low-attenuation changes within the parasagittal left frontal lobe posteriorly and within the left middle frontal gyrus corresponding to presumed metastatic deposits which were better appreciated on the recent MRI exam. 3.  Additional low-attenuation changes within the periventricular cerebral white matter on the right and within the right middle frontal gyrus at sites of presumed previously treated lesions. 4.  Superior mediastinal lymph node metastasis and partially visualized presumed right perihilar mass. Correlate with findings on prior dedicated chest imaging.       Signed by: Nas Hoyos MD

## 2021-06-13 NOTE — PROGRESS NOTES
"Pt admitted for hydration c/o worsening of sob today. C/o midsternal cp with deep inspiration and cough that is persistent. Feels as though he is unable to take a deep breathe. Occ tachypneic  with respiratory rate being in the high 30's. \"States its getting to be so much work just to breathe\" Lungs with vamsi crackles and diminished breathe sounds in r apex. Pt coughs very harshly when he does take a deep breathe which then triggers nausea and vomiting of thick yellow drainage. Continues to run low grade temp. Spoke with Dr Calderon and pt recommends transfer to the ER. Report called to Petra RUBY Charge nurse in ER and pt transferred via w/c after webber switched to power port access prior to discharge.  "

## 2021-06-13 NOTE — PROGRESS NOTES
SBRT/SRS OTV Note      Assessment / Impression       1. Metastasis to brain     2. Non-small cell lung cancer, unspecified laterality       Non-small cell lung cancer, unspecified laterality    Staging form: Lung, AJCC 7th Edition    - Clinical: Stage IV (T2b, N2, M1b) - Signed by Nas Hoyos MD on 8/20/2015    - Pathologic: No stage assigned - Unsigned     Tolerating radiation therapy well.  All questions and concerns addressed.    Plan:     Continue radiation treatment as prescribed.  Radiation: Site: L frontal met  Stereotactic Radiosurgery: Yes  Stereotactic Radiosurgery date: 09/25/17  Today's Dose: 2400  Total Dose for Brain: 2400  Today's Fraction/Total Fraction Brain: 1/1      Subjective:      HPI: Jack Ricketts is a 32 y.o. male with    1. Metastasis to brain     2. Non-small cell lung cancer, unspecified laterality         The following portions of the patient's history were reviewed and updated as appropriate: allergies, current medications, past family history, past medical history, past social history, past surgical history and problem list.      Objective:     Exam:   Vitals:    09/25/17 1240   BP: 109/79   Pulse: (!) 122   Temp: 98  F (36.7  C)   TempSrc: Oral   SpO2: 94%   Weight: (!) 242 lb 4.8 oz (109.9 kg)       Wt Readings from Last 8 Encounters:   09/25/17 (!) 242 lb 4.8 oz (109.9 kg)   09/21/17 (!) 248 lb 1.6 oz (112.5 kg)   09/17/17 (!) 240 lb 14.4 oz (109.3 kg)   09/11/17 (!) 246 lb (111.6 kg)   09/11/17 (!) 245 lb (111.1 kg)   09/05/17 (!) 243 lb 9.6 oz (110.5 kg)   08/07/17 (!) 249 lb 4.8 oz (113.1 kg)   07/10/17 (!) 245 lb 9.6 oz (111.4 kg)       General: Alert and oriented, in no acute distress  Jack has no Erythema.    Treatment Summary to Date    [facility] chart and setup information reviewed    Carley Jackson MD

## 2021-06-14 NOTE — PROGRESS NOTES
[facility] Hematology and Oncology Inpatient Progress Note    Patient: Jack Ricketts  MRN: [id number]  Date of Service: 12/6/2017        Reason for Visit:    Midcycle follow up.     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     2012, September - diagnosed with what was thought to be small cell lung cancer, limited stage, located in the mediastinum measuring 10 cm.    Biopsy confirmed CK7 positive, synaptophysin positive chromogranin expressing cells.     2012, December - completed initial treatment with chemoradiation therapy (4 cycles cisplatin and -16).     Posttreatment PET scan showed only a minimal response.     Rebiopsy revealed a component of nonsmall cell lung cancer as well, EGFR and ALK-1 mutation negative.     06/2013 - referred to Dr. Jose Amezcua, [facility], who recommended Taxotere chemotherapy.     2013, July - completed CyberKnife with 4 weekly doses radiosensitizing carboplatin and Taxol chemotherapies.     02/2014, PET scan (compared to prior PET scan in November, 2013) revealed subcarinal node hypermetabolism compatible with active metastasis.     2014, July - completed 6 cycles palliative topotecan chemotherapy.     7/16/2014 PET/CT (compared to 04/28/2014) revealed persistent but decreased subcarinal hypermetabolism with no new hypermetabolic lesions identified.     Due to significant fatigue secondary to chemotherapy, he was given a break from treatment.     2014, September - November resumed cycle #7 topotecan with Avastin added.     In November, 2014, he was only able to get day 1 of the topotecan and Avastin due to fatigue.     2014, December - 2015, February received maintenance Avastin.    03/11/15 PET showed recurrent mild focal FDG activity in the subcarinal region of the mediastinum and new 2.3 cm FDG avid lesion in the liver,  suspicious for metastatic lung cancer    03/19/15 liver biopsy negative    08/5/15 PET showed progressive disease in a subcarinal lymph node, enlarging and increasingly FDG avid hepatic metastasis, and renewed tumor metabolism at the primary tumor site in the right hilum.    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     08/19/15 MRI brain - multiple enhancing lesions in the bilateral cerebral hemispheres.     2015, September 4 - completed 2,200 cGy CyberKnife radiosurgery.     2015, September - completed 2 cycles Alimta with Avastin    10/12/15 LFT elevation prompted restaging PET showing disease progression.    2015, December - completed 3+ cycles Topotecan with Avastin chemotherapy.    01/13/16 restaging PET - progressive adenopathy and right liver disease.    PD-L1 positive @ 60%.     2016, July 18 - completed 9 cycles Keytruda     07/11/16 PET - Area of active tumor metabolism in the right hilum has enlarged and increased in FDG activity. A single right hilar lymph node has become moderately FDG avid. No evidence of active tumor metabolism outside of the right hemithorax.    07/26/16 EBUS pathology showed recurrent neuroendocrine carcinoma.     08/15/16 - 3 day history of left sided weakness, occasional speech slurring and headaches yet managed with Tylenol.     08/16/16 brain MRI - showed radiation necrosis. Began dex 4 mg t.i.d. with food.     2016, October 24 - completed 3 cycles Opdivo.    10/25 - 11/08/16 - hospitalized @ 's ICU with acute respiratory failure - suspected Opdivo related. Treated emperically for PCP and placed on steroids. Bronch showed no evidence of malignancy, only mixed inflammatory cells. No pneumocystis carinii.     11/15/16 MR head - interval decrease in the irregular enhancement as well as the T2 prolongation in the right corona radiata and right periventricular region. No new enhancing intracranial  lesions.    11/21/16 follow up Dr. Dacosta [facility] - head MRI improved. Off dexamethasone. Follow up in 3 months with MRI head.    12/15 - 12/20/16 hospitalized with shortness of breath. Found to have a PE as well as CT showing progression of his lung cancer.     He was started on Lovenox for anticoagulation which was later transitioned to warfarin.    12/05/16 - 02/20/17 completed 3 cycles carboplatin + Etoposide palliative chemotherapy followed by Neulasta.    03/05 - 03/18/17 - hospitalized with shortness of breath, hemoptysis and fever.     Progressed to respiratory failure and intubation.     It appeared he was bleeding from the right lung where there was cancer invasion of the bronchus. The bleeding stopped and he was extubated and discharged home.     It was decided not to continue anticoagulation.     The plan was for him to recuperate and then consider further treatment for the lung cancer.    04/03 - 04/07/17 - hospitalized with fever, hypotension, multiple joint pains and swellings and abdominal discomfort.     Left knee tapped - white blood cells in the fluid with about 45% neutrophils.     Procalcitonin quite low and lactic acid WNL.     CT CAP - quite stable in the chest.  Thickening of the ascending colon and focal inflammation in the fat between the left psoas and iliacus muscles.     Influenza A & B negative.    + C. Difficile - on oral vancomycin    Blood cultures negative.    06/19/17 - D1C1 single agent Abraxane at 260 mg/m .  Due to his prior chemotherapy and his body habitus he is dosed per ideal body weight (570 mg IV given every 3 weeks).       07/05 - Laparoscopic cholecystectomy and abdominal washout for acute gangrenous cholecystitis.     07/17/17 - D1C2 single agent Abraxane at 260 mg/m     08/01/17 MRI brain - Interval enlargement of two presumed left frontal lobe metastases measuring 5 and 3 mm in size.  The size of the right frontal lobe lesions is not changed. Surrounding T2  hyperintensity is also stable. The enhancement of these lesions is mildly more intense than prior, the difference possibly being technical. They do not demonstrate definite elevated relative cerebral blood volume.    08/07/17 - D1C3 single agent Abraxane at 260 mg/m     08/30/17 CT CAP - Slight interval increase in size of right perihilar mass and hilar lymphadenopathy with obstruction of the right mainstem bronchus resulting in collapse of the right middle lobe.  Low dense lesion in the right lobe of the liver just under the diaphragm appears unchanged. It is not adequately characterized.  A similar-appearing lesion is now seen in the right lobe near the diamond hepatis.  Small nodule in the periphery of the right upper lobe is unchanged.    09/08/17 - stools (+) C.dif and started on Vanco 125 every 6 hours.    2nd opinion @ [facility].      He might be eligible for a clinical study later on.      Recommendation was to start TECCENTRIQ until he is cleared for the study.       09/18/17 MRI brain - showing (L) cerebral lesion growing in size and intensity.     Completed 24 Gy 1 fx CyberKnife therapy to new (L) frontal lesion.     11/22 - 11/25 hospitalized - pain management.  Temp to 101, blood cultures were obtained and he was started on IV antibiotics.  Fever resolved and his blood cultures failed to grow anything after 3 days of incubation.    11/22/17 CT chest - Entire right lung now densely consolidated which makes measurement of the perihilar mass difficult.  As before, the mass encases and attenuates the airways and pulmonary arteries of the right lung.  Significant increase in size and number of hepatic metastases.  A portion of the mediastinal and left supraclavicular philip metastases have enlarged.  Stable cluster of nodules of the left perihilar region also suspicious for metastases. Increase in volume of small to moderate pericardial effusion.    11/27/17 - C1 Atezolizumab.    12/06:    CBC - WBC 6.9.  ANC  5.1.  HGB  PLT    CMP - significant hypomagnesemia, otherwise acceptable.    Will give 4 grams IV magnesium.    TSH - 10.47    Increased synthroid from 112 mcg to 125 mcg - new Rx escribed.    Always tired.  More SOB - 02 2L NC around the clock.  Decreased appetite - weight down 9 pounds since treatment.  Swallowing issues - need to see ENT.  More pain chest and back - taking 20 mg OxyContin every 12 hours + 5 mg oxycodone 2 tabs about 10 times daily.       Continue on prednisone replacement therapy.      Follow-up with Dr. Jackson later today post left cerebellar lesion treatment with MRI - head.    12/08/17 - follow up with labs and possible additional magnesium/elyte replete.    2) Pulmonary embolism:    Was on Lovenox.  Currently on no anticoagulation.    3)  Hypothyroid:    TSH 10.47.    Secondary to Pembrolizumab    On replacement - synthroid dose increased from 112 to 125 mcg daily - Rx escribed.    4) Pain:    Chest and back - taking 20 mg OxyContin every 12 hours + 5 mg oxycodone 2 tabs about 10 times daily.  Not adequate per patient.    Increase OxyContin to 40 mg - 20 mg - 40 mg every 8 hours.      Continue 5-10 mg prn for breakthrough pain.    New RX escribed.    Reviewed opiod costipation concerns - no current issues.    ECOG Performance Status @ 1        TT > 25 minutes face to face with > 50% counseling and care coordination.    Derrick Corado, CNP  ______________________________________________________________________________    Interim History:    The patient presents midcycle #1 Atezolizumab.  His abdominal pain has pretty much resolved.  However, states he is not eating very well.  He is drinking water but not enough he believes.  His fluid intake consists of about 30 ounces daily of water, rasbery lemonade and 1 Boost daily.  He is noting early satiety and his weight has dropped 9 pounds since start of treatment.  He continues to note swallowing issues and needs to see ENT.  He states he is  always tired and more SOB.  02 continues on 2L NC around the clock.   He is using nebulizers.  He yet gets some coughing spells which can be pretty distressing for him.  He denies hemoptysis or blood-tinged sputum.  He is noting more pain in his chest and back.  He denies headache, visual or mentation disturbance, bladder issues.       Review of Systems:    10 point review of systems negative there than what is listed in HPI.    Physical Exam:    Recent Vitals 12/6/2017   Height -   Weight 218 lbs   BSA (m2) -   BP 93/54   Pulse 127   Temp -   Temp src -   SpO2 91   Some recent data might be hidden       GENERAL:   Alert and oriented.  No acute distress. Appears very fatigued.    HEENT:   Atraumatic and normocephalic.  CAPRICE, EOMI.  No pallor.  No icterus.  No mucosal lesions.    LYMPH NODES:  No palpable cervical, axillary or inguinal lymphadenopathy.    CHEST:   Quite clear.    Port-a-cath site looks good.    CVS:    S1 and S2 heard. Regular rate and rhythm.  No murmur, gallop or rub heard.    ABDOMEN:   Soft.  Not tender.  Not distended.  No palpable hepatomegaly or splenomegaly.  No other mass palpable.    EXTREMITIES:  Warm.  No joint swelling or edema.    SKIN:    No noted rash or bruising.    Lab Results:    Reviewed with patient.    Recent Results (from the past 24 hour(s))   Basic Metabolic Panel   Result Value Ref Range    Sodium 139 136 - 145 mmol/L    Potassium 3.5 3.5 - 5.0 mmol/L    Chloride 94 (L) 98 - 107 mmol/L    CO2 34 (H) 22 - 31 mmol/L    Anion Gap, Calculation 11 5 - 18 mmol/L    Glucose 106 70 - 125 mg/dL    Calcium 9.6 8.5 - 10.5 mg/dL    BUN 7 (L) 8 - 22 mg/dL    Creatinine 0.77 0.70 - 1.30 mg/dL    GFR MDRD Af Amer >60 >60 mL/min/1.73m2    GFR MDRD Non Af Amer >60 >60 mL/min/1.73m2   TSH   Result Value Ref Range    TSH 10.47 (H) 0.30 - 5.00 uIU/mL   HM1 (CBC with Diff)   Result Value Ref Range    WBC 6.9 4.0 - 11.0 thou/uL    RBC 3.63 (L) 4.40 - 6.20 mill/uL    Hemoglobin 9.8 (L) 14.0 -  18.0 g/dL    Hematocrit 31.6 (L) 40.0 - 54.0 %    MCV 87 80 - 100 fL    MCH 27.0 27.0 - 34.0 pg    MCHC 31.0 (L) 32.0 - 36.0 g/dL    RDW 14.8 (H) 11.0 - 14.5 %    Platelets 296 140 - 440 thou/uL    MPV 9.3 8.5 - 12.5 fL    Neutrophils % 74 (H) 50 - 70 %    Lymphocytes % 15 (L) 20 - 40 %    Monocytes % 10 2 - 10 %    Eosinophils % 1 0 - 6 %    Basophils % 0 0 - 2 %    Neutrophils Absolute 5.1 2.0 - 7.7 thou/uL    Lymphocytes Absolute 1.0 0.8 - 4.4 thou/uL    Monocytes Absolute 0.7 0.0 - 0.9 thou/uL    Eosinophils Absolute 0.1 0.0 - 0.4 thou/uL    Basophils Absolute 0.0 0.0 - 0.2 thou/uL   Magnesium   Result Value Ref Range    Magnesium 1.1 (L) 1.8 - 2.6 mg/dL         Imaging:    No new imaging.

## 2021-06-14 NOTE — PROGRESS NOTES
Pt here for txt. PT states has been tolerating txt with only fatigue. Port previously accessed and labs drawn/approved for txt. PT also seen by Dr Hoyos today. Atezolizumab infused over 30 minutes and pt tolerated infusion without any problems. Tubing flushed with ns then port flushed with heparin/deaccessed with 2x2 to site. Follow up reviewed and pt dc'd to lobby waiting for transportation.

## 2021-06-14 NOTE — PROGRESS NOTES
[facility] [place] [facility] Hematology and Oncology Inpatient Progress Note    Patient: Jack Ricketts  MRN: [id number]  Date of Service: 12/8/2017        Reason for Visit:    Short follow up.     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     2012, September - diagnosed with what was thought to be small cell lung cancer, limited stage, located in the mediastinum measuring 10 cm.    Biopsy confirmed CK7 positive, synaptophysin positive chromogranin expressing cells.     2012, December - completed initial treatment with chemoradiation therapy (4 cycles cisplatin and -16).     Posttreatment PET scan showed only a minimal response.     Rebiopsy revealed a component of nonsmall cell lung cancer as well, EGFR and ALK-1 mutation negative.     06/2013 - referred to Dr. Jose Amezcua, [facility], who recommended Taxotere chemotherapy.     2013, July - completed CyberKnife with 4 weekly doses radiosensitizing carboplatin and Taxol chemotherapies.     02/2014, PET scan (compared to prior PET scan in November, 2013) revealed subcarinal node hypermetabolism compatible with active metastasis.     2014, July - completed 6 cycles palliative topotecan chemotherapy.     7/16/2014 PET/CT (compared to 04/28/2014) revealed persistent but decreased subcarinal hypermetabolism with no new hypermetabolic lesions identified.     Due to significant fatigue secondary to chemotherapy, he was given a break from treatment.     2014, September - November resumed cycle #7 topotecan with Avastin added.     In November, 2014, he was only able to get day 1 of the topotecan and Avastin due to fatigue.     2014, December - 2015, February received maintenance Avastin.    03/11/15 PET showed recurrent mild focal FDG activity in the subcarinal region of the mediastinum and new 2.3 cm FDG avid lesion in the liver, suspicious  for metastatic lung cancer    03/19/15 liver biopsy negative    08/5/15 PET showed progressive disease in a subcarinal lymph node, enlarging and increasingly FDG avid hepatic metastasis, and renewed tumor metabolism at the primary tumor site in the right hilum.    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     08/19/15 MRI brain - multiple enhancing lesions in the bilateral cerebral hemispheres.     2015, September 4 - completed 2,200 cGy CyberKnife radiosurgery.     2015, September - completed 2 cycles Alimta with Avastin    10/12/15 LFT elevation prompted restaging PET showing disease progression.    2015, December - completed 3+ cycles Topotecan with Avastin chemotherapy.    01/13/16 restaging PET - progressive adenopathy and right liver disease.    PD-L1 positive @ 60%.     2016, July 18 - completed 9 cycles Keytruda     07/11/16 PET - Area of active tumor metabolism in the right hilum has enlarged and increased in FDG activity. A single right hilar lymph node has become moderately FDG avid. No evidence of active tumor metabolism outside of the right hemithorax.    07/26/16 EBUS pathology showed recurrent neuroendocrine carcinoma.     08/15/16 - 3 day history of left sided weakness, occasional speech slurring and headaches yet managed with Tylenol.     08/16/16 brain MRI - showed radiation necrosis. Began dex 4 mg t.i.d. with food.     2016, October 24 - completed 3 cycles Opdivo.    10/25 - 11/08/16 - hospitalized @ 's ICU with acute respiratory failure - suspected Opdivo related. Treated emperically for PCP and placed on steroids. Bronch showed no evidence of malignancy, only mixed inflammatory cells. No pneumocystis carinii.     11/15/16 MR head - interval decrease in the irregular enhancement as well as the T2 prolongation in the right corona radiata and right periventricular region. No new enhancing intracranial lesions.    11/21/16  follow up Dr. Dacosta [facility] - head MRI improved. Off dexamethasone. Follow up in 3 months with MRI head.    12/15 - 12/20/16 hospitalized with shortness of breath. Found to have a PE as well as CT showing progression of his lung cancer.     He was started on Lovenox for anticoagulation which was later transitioned to warfarin.    12/05/16 - 02/20/17 completed 3 cycles carboplatin + Etoposide palliative chemotherapy followed by Neulasta.    03/05 - 03/18/17 - hospitalized with shortness of breath, hemoptysis and fever.     Progressed to respiratory failure and intubation.     It appeared he was bleeding from the right lung where there was cancer invasion of the bronchus. The bleeding stopped and he was extubated and discharged home.     It was decided not to continue anticoagulation.     The plan was for him to recuperate and then consider further treatment for the lung cancer.    04/03 - 04/07/17 - hospitalized with fever, hypotension, multiple joint pains and swellings and abdominal discomfort.     Left knee tapped - white blood cells in the fluid with about 45% neutrophils.     Procalcitonin quite low and lactic acid WNL.     CT CAP - quite stable in the chest.  Thickening of the ascending colon and focal inflammation in the fat between the left psoas and iliacus muscles.     Influenza A & B negative.    + C. Difficile - on oral vancomycin    Blood cultures negative.    06/19/17 - D1C1 single agent Abraxane at 260 mg/m .  Due to his prior chemotherapy and his body habitus he is dosed per ideal body weight (570 mg IV given every 3 weeks).       07/05 - Laparoscopic cholecystectomy and abdominal washout for acute gangrenous cholecystitis.     07/17/17 - D1C2 single agent Abraxane at 260 mg/m     08/01/17 MRI brain - Interval enlargement of two presumed left frontal lobe metastases measuring 5 and 3 mm in size.  The size of the right frontal lobe lesions is not changed. Surrounding T2 hyperintensity is also stable.  The enhancement of these lesions is mildly more intense than prior, the difference possibly being technical. They do not demonstrate definite elevated relative cerebral blood volume.    08/07/17 - D1C3 single agent Abraxane at 260 mg/m     08/30/17 CT CAP - Slight interval increase in size of right perihilar mass and hilar lymphadenopathy with obstruction of the right mainstem bronchus resulting in collapse of the right middle lobe.  Low dense lesion in the right lobe of the liver just under the diaphragm appears unchanged. It is not adequately characterized.  A similar-appearing lesion is now seen in the right lobe near the diamond hepatis.  Small nodule in the periphery of the right upper lobe is unchanged.    09/08/17 - stools (+) C.dif and started on Vanco 125 every 6 hours.    2nd opinion @ [facility].      He might be eligible for a clinical study later on.      Recommendation was to start TECCENTRIQ until he is cleared for the study.       09/18/17 MRI brain - showing (L) cerebral lesion growing in size and intensity.     Completed 24 Gy 1 fx CyberKnife therapy to new (L) frontal lesion.     11/22 - 11/25 hospitalized - pain management.  Temp to 101, blood cultures were obtained and he was started on IV antibiotics.  Fever resolved and his blood cultures failed to grow anything after 3 days of incubation.    11/22/17 CT chest - Entire right lung now densely consolidated which makes measurement of the perihilar mass difficult.  As before, the mass encases and attenuates the airways and pulmonary arteries of the right lung.  Significant increase in size and number of hepatic metastases.  A portion of the mediastinal and left supraclavicular philip metastases have enlarged.  Stable cluster of nodules of the left perihilar region also suspicious for metastases. Increase in volume of small to moderate pericardial effusion.    11/27/17 - C1 TECCENTRIQ (Atezolizumab) therapy.    12/06:    Significant hypomagnesemia -  gave 4 grams IV magnesium in 1 liter IVFs.      TSH - 10.47 - increased synthroid from 112 mcg to 125 mcg.     CBC - OK    Weight down 9 pounds since treatment.   Always tired.    More pain chest and back - narcotics manipulated.      Dr. Jackson appointment with 11/27 MRI head - new small left temporal met.      Since small, neurologically stable and need to stop current chemo to treat, recommended focus be on chemotherapy.      Follow up with scan in mid February or after 4 cycles of chemotherapy.  Sooner if clinically indicated.     12/08:    BMP - basically WNL.    Magnesium 1.4 - give another 4 grams IV today in 1 liter IVFs.    Feeling better.  Weight up one pound since Wednesday.  Slightly less fatigue.  SOB managed with 02 2L NC around the clock.  No nausea but decreased appetite - again encouraged hyperKcal supplements, 3-4 daily..    Pain well managed with narcotic dosing below.        Continue prednisone, 7.5 mg daily, replacement therapy.      12/18/17 - follow up D1C2 TECCENTRIQ therapy with Dr Hoyos @ [facility] with labs.  Patient to call if issues/concerns arise in the interim.    12/28/17 - ENT consult for swallowing issues.     2) Pulmonary embolism:    Was on Lovenox.  Currently on no anticoagulation.    3)  Hypothyroid:    On Wednesday his TSH was 10.47.  Synthroid dose was increased from 112 to 125 mcg daily    Secondary to Pembrolizumab.    4) Pain:    Much better management on:    OxyContin 40 mg - 20 mg - 40 mg every 8 hours.      5-10 mg prn for breakthrough pain.    Reviewed opiod costipation concerns - no current issues.    ECOG Performance Status @ 1        TT > 20 minutes face to face with > 50% counseling and care coordination.    Derrick Corado, CNP  ______________________________________________________________________________    Interim History:    The patient presents in short follow up due to feling quite poorly earlier in the week on his midcycle #1 Atezolizumab follow up.  His  "chest and back pain is under much better management having increased OxyContin from 20 mg twice daily to 40-20-40 mg every 8 hour dosing on Wednessday.  He has now only used 10 mg breakthrough oxycodone 6 times in the past 48 hours, previously using it about 10 times daily.  He denies constipation.  He states he is eating a bit better, but still notes early satiety. He is now taking 2-Boost supplements daily.  He is pushing oral fluids better.  His weight is up a pound after having dropped 9 pounds since start of treatment.  He continues to note swallowing issues and needs to see ENT.  We have now secured an appointment for him for later in the month.  He continues to state he always feels tired.  02 continues on 2L NC around the clock.   He is using his nebulizers.  His occasional coughing spells persist which can be pretty distressing for him.  He denies hemoptysis or blood-tinged sputum.  He denies headache, visual or mentation disturbance, bladder issues.       Review of Systems:    10 point review of systems negative there than what is listed in HPI.    Physical Exam:    Recent Vitals 12/8/2017   Height 6' 1\"   Weight 219 lbs 10 oz   BSA (m2) 2.26 m2   BP 91/63   Pulse 122   Temp 98.4   Temp src 1   SpO2 90   Some recent data might be hidden       GENERAL:   Alert and oriented.  No acute distress.  Less fatigued.    HEENT:   Atraumatic and normocephalic.  CAPRICE, EOMI.  No pallor.  No icterus.  No mucosal lesions.    LYMPH NODES:  No palpable cervical, axillary or inguinal lymphadenopathy.    CHEST:   Quite clear.    Port-a-cath site looks good.    CVS:    S1 and S2 heard. Regular rate and rhythm.  No murmur, gallop or rub heard.    ABDOMEN:   Soft.  Not tender.  Not distended.  No palpable hepatomegaly or splenomegaly.  No other mass palpable.    EXTREMITIES:  Warm.  No joint swelling or edema.    SKIN:    No noted rash or bruising.    Lab Results:    Reviewed with patient.    Recent Results (from the past 168 " hour(s))   Basic Metabolic Panel   Result Value Ref Range    Sodium 139 136 - 145 mmol/L    Potassium 3.5 3.5 - 5.0 mmol/L    Chloride 94 (L) 98 - 107 mmol/L    CO2 34 (H) 22 - 31 mmol/L    Anion Gap, Calculation 11 5 - 18 mmol/L    Glucose 106 70 - 125 mg/dL    Calcium 9.6 8.5 - 10.5 mg/dL    BUN 7 (L) 8 - 22 mg/dL    Creatinine 0.77 0.70 - 1.30 mg/dL    GFR MDRD Af Amer >60 >60 mL/min/1.73m2    GFR MDRD Non Af Amer >60 >60 mL/min/1.73m2   TSH   Result Value Ref Range    TSH 10.47 (H) 0.30 - 5.00 uIU/mL   HM1 (CBC with Diff)   Result Value Ref Range    WBC 6.9 4.0 - 11.0 thou/uL    RBC 3.63 (L) 4.40 - 6.20 mill/uL    Hemoglobin 9.8 (L) 14.0 - 18.0 g/dL    Hematocrit 31.6 (L) 40.0 - 54.0 %    MCV 87 80 - 100 fL    MCH 27.0 27.0 - 34.0 pg    MCHC 31.0 (L) 32.0 - 36.0 g/dL    RDW 14.8 (H) 11.0 - 14.5 %    Platelets 296 140 - 440 thou/uL    MPV 9.3 8.5 - 12.5 fL    Neutrophils % 74 (H) 50 - 70 %    Lymphocytes % 15 (L) 20 - 40 %    Monocytes % 10 2 - 10 %    Eosinophils % 1 0 - 6 %    Basophils % 0 0 - 2 %    Neutrophils Absolute 5.1 2.0 - 7.7 thou/uL    Lymphocytes Absolute 1.0 0.8 - 4.4 thou/uL    Monocytes Absolute 0.7 0.0 - 0.9 thou/uL    Eosinophils Absolute 0.1 0.0 - 0.4 thou/uL    Basophils Absolute 0.0 0.0 - 0.2 thou/uL   Magnesium   Result Value Ref Range    Magnesium 1.1 (L) 1.8 - 2.6 mg/dL   Magnesium   Result Value Ref Range    Magnesium 1.4 (L) 1.8 - 2.6 mg/dL   Basic Metabolic Panel   Result Value Ref Range    Sodium 136 136 - 145 mmol/L    Potassium 3.5 3.5 - 5.0 mmol/L    Chloride 93 (L) 98 - 107 mmol/L    CO2 28 22 - 31 mmol/L    Anion Gap, Calculation 15 5 - 18 mmol/L    Glucose 95 70 - 125 mg/dL    Calcium 9.4 8.5 - 10.5 mg/dL    BUN 8 8 - 22 mg/dL    Creatinine 0.73 0.70 - 1.30 mg/dL    GFR MDRD Af Amer >60 >60 mL/min/1.73m2    GFR MDRD Non Af Amer >60 >60 mL/min/1.73m2       Imaging:    No new imaging.

## 2021-06-14 NOTE — PROGRESS NOTES
Pt had CT scan today and here in clinic to see Dr Hoyos.  Pt requests to lay in bed as he is having pain in chest and back.  Pt requesting O2 for comfort.  Per Dr Hoyos 1 mg dilaudid given, pt notes pain improved.  Vitals taken and temp 101.1 noted and Dr Hoyos aware  And 73/54 BP.  Pt will be transferred to in patient room.

## 2021-06-14 NOTE — PROGRESS NOTES
Pt amb into clinic on O2 for IVF for orthostatic BP issues. Pt has 1530 appt in Rad Onc. Pt tolerated IVF well, to return tomorrow for mag sulf infusion. Port flushed, de-accessed per pt request. Pt wheeled to Rad Onc by volunteers

## 2021-06-14 NOTE — PROGRESS NOTES
ASSESSMENT:  1.  Lung cancer  Followed by oncology, patient is having chest wall pain secondary to underlying cancer.  - oxyCODONE (ROXICODONE) 5 MG immediate release tablet; Two every four hours as needed for cancer pain.  Average 6 daily.  Dispense: 84 tablet; Refill: 0    2. Adrenal insufficiency  Fairly recent diagnosis on prednisone followed by endocrinology.  - predniSONE (DELTASONE) 2.5 MG tablet; Take two tablets (5 mg) and one tablet (2.5 mg) each evening.  Three daily.  Dispense: 90 tablet; Refill: 1    3. Esophageal stricture  Noted on EGD mid esophageal stricture, secondary to compression from the cancer    4. Depression  Presumed secondary to underlying medical problems.      PLAN:  1.  I renewed the patient's levothyroxine, oxycodone, prednisone Tessalon Perles and also a prescription for Robitussin with codeine.  2.  Is not clear to me if I and/or the oncologist will be continuing to prescribe oxycodone, though I do think that it is legitimate.  3.  Begin sertraline 50 mg daily.  4.  In terms of the esophageal stricture, for now watchful waiting of the patient does develop more symptoms would refer to GI.  I explained to the patient I am not sure what if any intervention could be done to help alleviate this.  5.  Been seen mainly for hospital follow-up with concerns about depression.  Did review the hospital discharge as well as workup.  6.  Follow-up in early January.    No orders of the defined types were placed in this encounter.    Medications Discontinued During This Encounter   Medication Reason     levothyroxine (SYNTHROID) 112 MCG tablet Reorder     oxyCODONE (ROXICODONE) 5 MG immediate release tablet Reorder     predniSONE (DELTASONE) 2.5 MG tablet Reorder     benzonatate (TESSALON) 100 MG capsule Reorder       No Follow-up on file.    CHIEF COMPLAINT:  Chief Complaint   Patient presents with     Hospital Visit Follow Up     SOB        SUBJECTIVE:  Jack is a 32 y.o. male presenting to the  clinic today for a hospital visit follow-up regarding shortness of breath.     Shortness of Breath: He was seen in the ED on 11/1/17 with complaints of shortness of breath. Additionally, he has been experiencing some chest pains and a cough. Upon admission, he was immediately given a CT scan of the chest as a result of his lung cancer diagnosis. The CT scan did not show anything different, therefore they conducted in esophagram. The esophagram revealed a moderate esophageal stricture, which explained his symptoms. He was told that the stricture is likely due to the enlargement of the cancerous mass in his lung, as it has begun to push upon his esophagus. He notes that swallowing has also been difficult and he has had a hoarse voice since the onset of shortness of breath. He has been using Tessalon Perles for his cough which he notes has been keeping it well-controlled.     Depression: He states that his discharge summary recommended that he start on an anti-depressant. He has never been on one before.     Chronic Pain: He has been taking oxycodone for chronic cancer related pain. He requests a refill on the medication today. He takes two 5 mg pills every four hours.     Hypothyroidism: He requests a refill on levothyroxine.     Adrenal Insufficiency: He requests a refill on prednisone.     Health Maintenance: He has already received the influenza vaccine for the upcoming season.     REVIEW OF SYSTEMS:   All other systems are negative.    PFSH:  Immunization History   Administered Date(s) Administered     Influenza, inj, historic,unspecified 10/01/2014     Influenza,seasonal quad, PF, 36+MOS 12/17/2016, 11/02/2017     Pneumo Polysac 23-V 11/11/2016     Tdap 04/16/2011     Social History     Social History     Marital status: Single     Spouse name: N/A     Number of children: N/A     Years of education: N/A     Occupational History     Kitchen      [employer]     Social History Main Topics     Smoking status: Never  Smoker     Smokeless tobacco: Never Used     Alcohol use Yes      Comment: social     Drug use: No     Sexual activity: No     Other Topics Concern     Not on file     Social History Narrative    Lives with girlfriend        Exercise- Walking at work     Past Medical History:   Diagnosis Date     C. difficile colitis 09/2017     Family History   Problem Relation Age of Onset     Breast cancer Mother 80     No Medical Problems Father      No Medical Problems Brother      Lung cancer Brother 60       MEDICATIONS:  Current Outpatient Prescriptions   Medication Sig Dispense Refill     acetaminophen (TYLENOL) 500 MG tablet Take 500-1,000 mg by mouth every 6 (six) hours as needed for pain.       albuterol (PROAIR HFA;PROVENTIL HFA;VENTOLIN HFA) 90 mcg/actuation inhaler Inhale 2 puffs every 4 (four) hours as needed for wheezing.       albuterol (PROVENTIL) 2.5 mg /3 mL (0.083 %) nebulizer solution        benzonatate (TESSALON) 100 MG capsule Take 1 capsule (100 mg total) by mouth 3 (three) times a day as neededfor cough. 60 capsule 1     calcium, as carbonate, (TUMS) 200 mg calcium (500 mg) chewable tablet Chew 2 tablets every 4 (four) hours as needed for heartburn.        cholecalciferol, vitamin D3, (VITAMIN D3) 5,000 unit Tab Take 5,000 Units by mouth daily.        fludrocortisone (FLORINEF) 0.1 mg tablet Take 0.5 tablets (0.05 mg total) by mouth daily. 45 tablet 1     Lactobacillus rhamnosus GG (CULTURELLE) 10-15 Billion cell capsule Take 1 capsule by mouth daily.       levothyroxine (SYNTHROID) 112 MCG tablet Take 1 tablet (112 mcg total) by mouth daily. Replaces 150 mcg dose. 90 tablet 1     multivitamin therapeutic tablet Take 1 tablet by mouth daily.       omeprazole (PRILOSEC) 20 MG capsule Take 20 mg by mouth daily as needed.        ondansetron (ZOFRAN-ODT) 4 MG disintegrating tablet Take 1 tablet (4 mg total) by mouth every 8 (eight) hours as needed for nausea. 50 tablet 1     oxyCODONE (ROXICODONE) 5 MG  immediate release tablet Two every four hours as needed for cancer pain.  Average 6 daily. 84 tablet 0     polyethylene glycol (GLYCOLAX) 17 gram/dose powder Take 17 g by mouth daily as needed (for constipation).  0     potassium chloride SA (K-DUR,KLOR-CON) 10 MEQ tablet Take 1 tablet (10 mEq total) by mouth 2 (two) times a day. 60 tablet 1     predniSONE (DELTASONE) 2.5 MG tablet Take 1 tablet (2.5 mg total) by mouth every evening.  0     predniSONE (DELTASONE) 2.5 MG tablet Take two tablets (5 mg) and one tablet (2.5 mg) each evening.  Three daily. 90 tablet 1     sulfamethoxazole-trimethoprim (SEPTRA DS) 800-160 mg per tablet Take 1 tablet by mouth 3 (three) times a week. Take on Monday, Wednesday, and Friday.       codeine-guaiFENesin (GUAIFENESIN AC)  mg/5 mL liquid Take 5 mL by mouth 2 (two) times a day as needed for cough. 240 mL 0     sertraline (ZOLOFT) 50 MG tablet Take 1 tablet (50 mg total) by mouth daily. 30 tablet 2     No current facility-administered medications for this visit.      Facility-Administered Medications Ordered in Other Visits   Medication Dose Route Frequency Provider Last Rate Last Dose     heparin 100 unit/mL lockflush (PF) porcine 300-600 Units  300-600 Units Intravenous PRN Derrick Corado CNP   600 Units at 12/06/16 1502     heparin 100 unit/mL lockflush (PF) porcine 600 Units  600 Units Intravenous PRN Nas Hoyos MD   600 Units at 01/15/16 1445     sodium chloride 0.9 % flush 10 mL (NS)  10 mL Intravenous PRN Nas Hoyos MD   20 mL at 01/15/16 1445     sodium chloride 0.9 % flush 10 mL (NS)  10 mL Intravenous PRN Derrick Corado CNP   10 mL at 12/06/16 1505       TOBACCO USE:  History   Smoking Status     Never Smoker   Smokeless Tobacco     Never Used       VITALS:  Vitals:    11/10/17 1157   BP: 110/80   Pulse: (!) 132   Resp: (!) 92   Weight: (!) 231 lb (104.8 kg)     Wt Readings from Last 3 Encounters:   11/10/17 (!) 231 lb (104.8 kg)   11/04/17 (!) 238  lb (108 kg)   11/01/17 (!) 234 lb (106.1 kg)       PHYSICAL EXAM:  Constitutional:   Reveals an alert, pleasant, talkative male.  Vitals: per nursing notes.  Neuro:  Alert and oriented. Cranial nerves, motor, sensory exams are intact.  No gross focal deficits.  Psychiatric:  Memory intact, mood appropriate.    DATA REVIEWED:  Additional History from Old Records Summarized (2): Reviewed discharge summary 11/4/17; shortness of breath.   Decision to Obtain Records (1): None.  Radiology Tests Summarized or Ordered (1): Reviewed chest CT 11/1/17; lung cancer. Reviewed esophagram 11/2/17; moderate esophageal stricture.   Labs Reviewed or Ordered (1): Reviewed labs 11/2/17; troponin.   Medicine Test Summarized or Ordered (1): None.  Independent Review of EKG, X-RAY, or RAPID STREP (2 each): None.     The visit lasted a total of 18 minutes face to face with the patient. Over 50% of the time was spent counseling and educating the patient about medication management.    IMiguel, am scribing for and in the presence of, Dr. Ramirez.    I, Dr. Ramirez, personally performed the services described in this documentation, as scribed by Miguel Moran in my presence, and it is both accurate and complete.    Total data points: 4

## 2021-06-14 NOTE — PROGRESS NOTES
"Pt amb into clinic on O2 for mag sulf infusion. Confirmed with ASHLI Meza patient to receive only 1 L of IVF total today. Pt tolerated mag sulf infusion well though did c/o headache toward end of infusion \"I think I slept (in chair) funny\". HA resolved after repositioning, BP stable. Pt choosing to amb to Clinic lobby to wait for ride.  "

## 2021-06-14 NOTE — PROGRESS NOTES
Patient here per wheelchair with portable oxygen.  Patient is on 2 liters at home.  Patient seen in medical oncology today and given some fluids.  Per patient they are going to be switching his chemotherapy.  MRI of brain done 11/27/17 and is here today for results.  Seen by Dr. Jackson.  Plan RTC for follow up as directed by physician.

## 2021-06-14 NOTE — PROGRESS NOTES
[facility] Hematology and Oncology Progress Note    Patient: Jack Ricketts  MRN: [id number]  Date of Service: 11/22/2017           Reason for visit      1. Non-small cell lung cancer, unspecified laterality    2. Esophageal stricture    3. Cancer related pain         Assessment     1. Jack is very pleasant 32 y.o. gentleman with  non-small cell type of a lung cancer with neuroendocrine differentiation initially stage IIIB and later on developed liver metastases, treated with concurrent chemoradiation therapy with cisplatin and etoposide  then given more radiation and weekly carboplatinum and Taxol.  He had recurrence documented February 2014 and has received 6 cycles of topotecan. In December 2014 started on maintenance Avastin.  PET scan in March 2015 showed questionable recurrence in the liver.  In August 2015 Biopsy confirmed NSCLC with neuroendocrine differentiation. Started on Alimta and Avastin.  He developed brain metastases September 2015 while on that.  Treatment changed to topotecan with Avastin.  In January 2016 PET scan showed progression again and at that time he got started on on pembrolizumab.  PET scan in August 2016 showed slight worsening in the right hilar area.  So in August 2016 he started on Opdivo.  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  He was actually on treatment break recovering from pneumonitis that he got admitted at [facility] with pulmonary embolism.  His CT scan showed progression of his lung cancer.  In December 2016 started on carboplatin and etoposide.  3 cycles.  Septic episodes after that needing intubation and pressors.  In the beginning of April 2017 came in with polyarthritis and on prednisone again.  In June 2017 started on ABraxane. Tolerated it well.  In July 2017 he had acute cholecystitis and needed his gall bladder removed.  We delayed his treatment for about a week and then resumed treatment.   So far he has had 3 cycles of it.  He is having some wheezing and coughing.  He is using nebulizers etc.  He does get some coughing spells which can be pretty distressing for him.  No hemoptysis or blood-tinged sputum normally.  Recently seen by [facility].  He might be eligible for a clinical study later on.  Current recommendation from [facility] oncologist is that he should start on TECCENTRIQ until he is cleared for that study.  2. Brain metastases which have been treated with CyberKnife l. 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  MRI now showing Left cerebral lesion growing in size and intensity.  Recently in September 2017 had CyberKnife therapy to that..  3. Hypothyroidism due to Pembrolizumab. On replacement.  4. Pulmonary embolism was on Lovenox.  Currently on no anticoagulation.  His current auscultation findings suggest that he may either have collapsed lung or significant pleural effusion on his right side.  5. Significant joint pains in the beginning of April 2017.  Better on prednisone.  6. C. difficile colitis diagnosed in April 2017.  7.   Adrenal insufficiency diagnosed in May 2017.    Plan     1. I am going to get him a CT scan since I am concerned that he may be showing signs of rapid progression.  He also has significant pain and he may need to be admitted for pain control.  We also talked about treatment with TECCENTRIQ.  I again shared my concerns with him that he might be at risk of pneumonitis type of complications from TECCENTRIQ.  He really wants to try something.  I did talk to him about end-of-life issues briefly but he is not ready to give up yet.  2. Continue on prednisone  replacement therapy.    3. Follow-up with Dr. Jackson left cerebellar lesion posttreatment.  He is scheduled for an MRI later on next week.  4.   Discussed with his father that his condition is looking worse.    Stage      Lung Cancer    Primary site: Lung (Right) small cell as well as non-small  cell lung cancer adenocarcinoma    Clinical: Stage IIIA (T2b, N2, M0) signed by Nas Hoyos MD on 9/26/2014  1:17 PM    Summary: Stage IIIA (T2b, N2, M0)      History     Jack Ricketts is a very pleasant 32 y.o. old male with a history of what was thought to be small cell lung cancer, limited stage, in 09/2012 located in the mediastinum measuring 10 cm.  Biopsy confirmed CK7 positive, synaptophysin positive chromogranin expressing cells.  Initial treatment involved chemoradiation therapy with cisplatin and -16.  Post treatment PET scan showed only a minimal response.  Rebiopsy revealed a component of nonsmall cell lung cancer as well, EGFR and ALK-1 mutation negative.  He was referred to Dr. Jsoe Amezcua [facility] who recommended Taxotere chemotherapy.  The patient opted for radiation therapy (CyberKnife) with radiosensitizing weekly carboplatin and Taxol chemotherapies initiated in June 2013.  He tolerated treatment quite well.  He had complete response to the treatment.       February, 2014, PET scan (compared to prior PET scan in November, 2013) revealed subcarinal node hypermetabolism compatible with active metastasis. This was also biopsied and proven to be malignant and suggestive of small cell type of cancer.  We started him on palliative topotecan chemotherapy day 1 through 5 every 3 weeks.  Cycle one was initiated on 03/04/2014 and he finished 6 cycles of that.  His PET scan after 3 cycles showed minimal response then after 6 cycles his PET scan showed continued but incomplete response.  He did need some transfusion after the fifth cycle.    After couple months break was resumed his treatment with topotecan and Avastin.  He had 3 cycles .  His PET scan after the 2nd cycle had shown complete response.  He started on that his third cycle but could not completed because of respiratory infections.  After that in December 2014 he started on Avastin as a single agent for maintenance.  He  has been on that until March 2015 when PET scan that actually showed a questionable uptake in his liver as well as the subcarinal lymph node.  He got a CT-guided biopsy of the liver lesion which was non diagnostic. At the time of the biopsy they had trouble locating the lesion.  Then in August 2015 the liver lesions got bigger and we biopsied again and came back positive for neuroendocrine carcinoma of lung origin.  He was started on treatment with Alimta and Avastin.  However he developed brain metastases in September.  Treated with CyberKnife therapy.  We change his treatment to topotecan and Avastin.  He has had couple cycles of that.  He became quite anemic and needed transfusion last week.  His brain MRI in the beginning of January 2016 showed improvement.  However his PET scan in January 2016 showed worsening in the liver lesion.    We tested his tumor for PDL-1 mutation and he did have favorable mutation for which pembrolizumab would be a targeted agent. He started on that in the end of January 2016.  He was noted to be hypothyroid after 3 cycles. Started on synthroid. He did notice depression which is better after synthroid. In August 2016 his PET scan showed progression. He was started on Opdivo. He did fine initially but then later on in October he started to have pneumonitis-type of picture.  It was felt to be secondary to Opdivo.    He was put on some steroid Opdivo was stopped.    He was admitted at [facility] second week of December 2016 with shortness of breath.  He was found to have pulmonary embolism as well as his CT scan showed progression of his lung cancer.  We started him on palliative chemotherapy with carboplatin and etoposide in December and he got 3 cycles of it.  He needed Neulasta.  He had a rough time after the third cycle.  He was admitted with pneumonia and other and problems.  In fact he needed to be intubated and was on pressors.  He has recovered from that.  He has been on  observation since late February 2017.    Due to persistent disease on PET scan, started on Abraxane in June. He was admitted with RUQ pain and found to have acute cholecystitis and on July 4th he had his gall bladder removed. Feels better now. Resumed treatment. Noticing increased wheezing off and on.  He has finished 3 cycles of it.  His CT scan in the in September showed that he might not have had any good response to the treatment.  Since that time he has had another episode or 2 where he has admitted in the hospital with pain control issues.  He is also not eating very well.    Most recently Dr. Jackson treated 1 of his brain metastases which seems to be getting worse on the MRI.  Dr. Jackson treated him with a single fraction of radiation by CyberKnife.  He handled the CyberKnife treatment well.  His Abraxane has been on hold since September 2017.  I did send his tissue for strata testing however it did not return any targetable mutation.  He was referred to [facility] for evaluation for any clinical trials.  He has met with them and there is a clinical trial for which he might be eligible but the oncologist who he saw at [facility] recommend that he should probably proceed with TECCENTRIQ since there will be a delay in getting started on that clinical study.  He comes in today to discuss that.  He is feeling tired and having coughing.  No hemoptysis.    Past Medical History     Bronchitis, lung cancer, renal insufficiency    Family History   Problem Relation Age of Onset     Breast cancer Mother 80     No Medical Problems Father      No Medical Problems Brother      Lung cancer Brother 60     Social History   Substance Use Topics     Smoking status: Never Smoker     Smokeless tobacco: Never Used     Alcohol use Yes      Comment: social       Stage      Lung Cancer    Primary site: Lung (Right)    Clinical: Stage IIIA (T2b, N2, M1b) signed by Nas Hoyos MD on 8/20/2015 10:08 AM    Summary: Stage IIIA  (T2b, N2, M1b)     Treatment History     1. 2012 cisplatin and -16 along with concurrent radiation therapy for 2 cycles.  2. Weekly carboplatin and Taxol along with radiation therapy in 2013.  3. 2014 topotecan day 1 through 5 every 3 weeks for 6 cycles  4. 2014 topotecan and Avastin ×3 cycles.  Third cycle was cut Short.  5. 2014 Avastin 15 mg/kg every 3 weeks maintenance treatment.  6. 2015 Avastin and Alimta.  7. Developed brain metastases in 2015 treated with CyberKnife.  8. 2015 started on topotecan with Avastin.  PET scan in 2016 showed progression.  9. 2016 started on pembrolizumab.  10. Opdivo started in August 15, 2016.  11. Carboplatin and etoposide 16 - 17 completed 3 cycles   12.  Abraxane with Avastin 2017-2017. CT shows slight progression.    Review of Systems   Constitutional  Constitutional (WDL): Exceptions to WDL  Fatigue: Fatigue not relieved by rest - Limiting instrumental ADL  Weight Loss: to <10% from baseline, intervention not indicated  Neurosensory  Neurosensory (WDL): Exceptions to WDL  Syncope: None  Cardiovascular     Pulmonary  Cough: Mild symptoms, nonprescription intervention indicated  Dyspnea: Shortness of breath with minimal exertion, limiting instrumental ADL    Gastrointestinal  Gastrointestinal (WDL): Exceptions to WDL  Anorexia: Associated with significant weight loss or malnutrition (e.g., inadequate oral caloric and/or fluid intake), tube feeding or TPN indicated  Nausea: Oral intake decreased without significant weight loss, dehydration or malnutrition  Vomitin - 2 episodes ( by 5 minutes) in 24 hrs  Genitourinary  Genitourinary (WDL): All genitourinary elements are within defined limits  Integumentary  Integumentary (WDL): All integumentary elements are within defined limits  Patient Coping  Patient Coping: Depression  ECOG Performance   1  Pain  Status  Currently in Pain: Yes  Accompanied by  Accompanied by: Alone      Vital Signs     Vitals:    11/22/17 1123   BP: 101/59   Pulse: (!) 136   Temp: 97.4  F (36.3  C)   SpO2: 97%       Physical Exam     Constitutional: Oriented to person, place, and time and appears well-developed.   HEENT:  Normocephalic and atraumatic.  Eyes: Conjunctivae and EOM are normal. Pupils are equal, round, and reactive to light. No discharge.  No scleral icterus. Nose normal. Mouth/Throat: Oropharynx is clear and moist. No oropharyngeal exudate.    NECK: Normal range of motion. Neck supple. No JVD present. No tracheal deviation present.  Scar from surgery and the neck is present.   CARDIOVASCULAR: Elevated heart rate, regular rhythm and intact distal pulses.  Exam reveals no gallop and no friction rub.  Systolic murmur present.  PULMONARY: He is slightly tachypneic.  Decreased air entry on the right side with dull percussion note.  Left side seems to be normal on auscultation and percussion.  ABDOMEN: Several ecchymosis areas. Incision looks good. Soft. Bowel sounds are normal. No distension and no mass. Minimal tenderness. There is no rebound and no guarding. No HSM.  MUSCULOSKELETAL: Normal range of motion. No edema and no tenderness. Mild kyphosis, no tenderness.  LYMPH NODES: Has no cervical, supraclavicular, axillary and groin adenopathy.   NEUROLOGICAL: Alert and oriented to person, place, and time. No cranial nerve deficit.  Normal muscle tone. Coordination normal.   GENITOURINARY: Deferred exam.  SKIN: Skin is warm and dry. Rash on back of the neck . No erythema. No pallor.   EXTREMITIES: No cyanosis, no clubbing, no edema. No Deformity.  PSYCHIATRIC: He is slight anxiety.      Lab Results     Results for orders placed or performed during the hospital encounter of 11/01/17   Influenza A/B Rapid Test   Result Value Ref Range    Influenza  A, Rapid Antigen No Influenza A antigen detected No Influenza A antigen detected     Influenza B, Rapid Antigen No Influenza B antigen detected No Influenza B antigen detected   Basic metabolic panel   Result Value Ref Range    Sodium 136 136 - 145 mmol/L    Potassium 3.3 (L) 3.5 - 5.0 mmol/L    Chloride 96 (L) 98 - 107 mmol/L    CO2 26 22 - 31 mmol/L    Anion Gap, Calculation 14 5 - 18 mmol/L    Glucose 96 70 - 125 mg/dL    Calcium 8.7 8.5 - 10.5 mg/dL    BUN 6 (L) 8 - 22 mg/dL    Creatinine 0.85 0.70 - 1.30 mg/dL    GFR MDRD Af Amer >60 >60 mL/min/1.73m2    GFR MDRD Non Af Amer >60 >60 mL/min/1.73m2   CBC   Result Value Ref Range    WBC 5.5 4.0 - 11.0 thou/uL    RBC 3.48 (L) 4.40 - 6.20 mill/uL    Hemoglobin 11.0 (L) 14.0 - 18.0 g/dL    Hematocrit 31.8 (L) 40.0 - 54.0 %    MCV 91 80 - 100 fL    MCH 31.6 27.0 - 34.0 pg    MCHC 34.6 32.0 - 36.0 g/dL    RDW 14.2 11.0 - 14.5 %    Platelets 267 140 - 440 thou/uL    MPV 9.5 8.5 - 12.5 fL   Troponin I   Result Value Ref Range    Troponin I <0.01 0.00 - 0.29 ng/mL   Blood gas, venous   Result Value Ref Range    pH, Venous 7.41 7.35 - 7.45    pCO2, Venous 49 35 - 50 mm Hg    pO2, Joe 35 25 - 47 mm Hg    Base Excess, Venous 5.5 mmol/L    HCO3, Venous 28.6 24.0 - 30.0 mmol/L    Oxyhemoglobin 67.1 (L) 70.0 - 75.0 %    O2 Sat, Venous 68.8 (L) 70.0 - 75.0 %   Protime-INR   Result Value Ref Range    INR 1.13 (H) 0.90 - 1.10   APTT   Result Value Ref Range    PTT 31 24 - 37 seconds   BNP(B-type Natriuretic Peptide)   Result Value Ref Range    BNP 36 (H) 0 - 35 pg/mL   Lactic Acid   Result Value Ref Range    Lactic Acid 1.4 0.5 - 2.2 mmol/L   Procalcitonin   Result Value Ref Range    Procalcitonin 0.07 0.00 - 0.49 ng/mL   Blood culture 1st   Result Value Ref Range    Anaerobic Blood Culture Bottle No Growth No Growth, No organisms seen, bottle returned to instrument, Specimen not received    Aerobic Blood Culture Bottle No Growth No Growth, No organisms seen, bottle returned to instrument, Specimen not received   Blood culture 2nd   Result Value Ref Range     Anaerobic Blood Culture Bottle No Growth No Growth, No organisms seen, bottle returned to instrument, Specimen not received    Aerobic Blood Culture Bottle No Growth No Growth, No organisms seen, bottle returned to instrument, Specimen not received   Troponin I   Result Value Ref Range    Troponin I <0.01 0.00 - 0.29 ng/mL   Procalcitonin   Result Value Ref Range    Procalcitonin <0.05 0.00 - 0.49 ng/mL   Troponin I   Result Value Ref Range    Troponin I <0.01 0.00 - 0.29 ng/mL   Renal function panel   Result Value Ref Range    Albumin 2.2 (L) 3.5 - 5.0 g/dL    Calcium 8.4 (L) 8.5 - 10.5 mg/dL    Phosphorus 2.8 2.5 - 4.5 mg/dL    Glucose 101 70 - 125 mg/dL    BUN 7 (L) 8 - 22 mg/dL    Creatinine 0.81 0.70 - 1.30 mg/dL    Sodium 136 136 - 145 mmol/L    Potassium 4.1 3.5 - 5.0 mmol/L    Chloride 100 98 - 107 mmol/L    CO2 28 22 - 31 mmol/L    Anion Gap, Calculation 8 5 - 18 mmol/L    GFR MDRD Af Amer >60 >60 mL/min/1.73m2    GFR MDRD Non Af Amer >60 >60 mL/min/1.73m2   Platelet Count - every other day x 3   Result Value Ref Range    Platelets 164 140 - 440 thou/uL   ECG 12 lead nursing unit performed   Result Value Ref Range    SYSTOLIC BLOOD PRESSURE 117 mmHg    DIASTOLIC BLOOD PRESSURE 65 mmHg    VENTRICULAR RATE 112 BPM    ATRIAL RATE 112 BPM    P-R INTERVAL 152 ms    QRS DURATION 84 ms    Q-T INTERVAL 318 ms    QTC CALCULATION (BEZET) 434 ms    P Axis 41 degrees    R AXIS 51 degrees    T AXIS 23 degrees    MUSE DIAGNOSIS       Sinus tachycardia  Otherwise normal ECG  When compared with ECG of 20-SEP-2017 21:33,  No significant change was found  Confirmed by YAEL ZABALA, TRISTA LOC:MEÑO ([id number]) on 11/2/2017 3:55:58 PM      reviewed his labs from his recent hospitalization.      Distress Assessment       Distress Assessment Score: 5     Imaging Results       Xr Swallow Study W Speech    Result Date: 11/2/2017  XR SWALLOW STUDY W SPEECH 11/2/2017 2:49 PM INDICATION: Dysphagia. TECHNIQUE: Routine. COMPARISON: None. FINDINGS:  FLUOROSCOPIC TIME: .8 minutes NUMBER OF IMAGES: 1 Swallow study with Speech Pathology using multiple barium thicknesses. All materials from the solid through thin liquids were swallowed normally. The barium tablet was also swallowed normally.     Cta Chest Pe Run    Result Date: 11/1/2017  CTA CHEST PE RUN 11/1/2017 2:47 PM INDICATION: pleuritic chest pain, shortness of breath. History of lung cancer. TECHNIQUE: Helical acquisition through the chest was performed during the arterial phase of contrast enhancement using IV contrast. 2D and 3D reconstructions were performed by the CT technologist. Dose reduction techniques were used. IV CONTRAST: Iohexol (Omni) 100 mL COMPARISON: 9/21/2017 FINDINGS: ANGIOGRAM CHEST: Pulmonary arteries of the right lung remain highly attenuated by the large hilar and central lung mass but no new filling defects are seen to suggest embolism. Pulmonary arteries to left lung are negative. LUNGS AND PLEURA: There is new atelectasis involving superior segment of left lower lobe but the large hilar and central lung mass extending into right lower, upper and middle lobes otherwise is unchanged. Small peripheral nodules within right lung stable. Fiducial markers are present. Stable grouping of nodules within superior segment left lower lobe with left lung otherwise clear. MEDIASTINUM: Obliteration of bronchus intermedius with bulky hilar mass/adenopathy confluent subcarinal region unchanged. Small pericardial effusion slightly more pronounced. Superior mediastinal lymph node abutting the left subclavian and carotid arteries has enlarged slightly, now measuring 2.7 x 2.3 cm, previously 2.5 x 2.2 cm. Enlarged left supraclavicular node on image 24 now measures 2.5 x 2 cm, previously measured 2 x 1.2 cm. LIMITED UPPER ABDOMEN: Significant enlargement in the previously identified liver lesion within medial segment left lobe now measures 4.8 x 3.4 cm, this measured 2 x 1.5 cm on abdominal CT of  9/14/2017 MUSCULOSKELETAL: No suspicious bony lesions.     CONCLUSION: 1.  No pulmonary emboli are identified. 2.  New atelectasis involving superior segment of right lower lobe. 3.  Otherwise no change in the large confluent mass involving central right lung and hilum. No change in small left-sided pulmonary nodules. 4.  Enlarging adenopathy superior mediastinum and left supraclavicular fossa. Enlarging hepatic metastases.    Xr Esophagram    Result Date: 11/2/2017  XR ESOPHAGRAM 11/2/2017 4:56 PM INDICATION: dysphagia. Lung cancer. TECHNIQUE: Routine. COMPARISON: None. FLUOROSCOPIC TIME: 1 minutes NUMBER OF IMAGES: 10 FINDINGS: Single contrast exam demonstrates smooth tapering and narrowing of the midesophagus occurring abdomen just below the level the elis. The distance of the narrowing is approximately a 5 cm segment. There is no significant holdup of liquids. However, a 13 mm barium tablet did become lodged at this stricture, the patient was asymptomatic with the tablet at water easily passed around the tablet. The tablet will dissolve shortly. Review of recent CT demonstrates similar findings with confluent mediastinal soft tissue fullness at this same level.     CONCLUSION: 1.  Smoothly tapering moderate mid esophageal stricture may relate to extrinsic tumor or post therapeutic changes after radiation.    Total time spent was 40 minutes, more than half of it was in face-to-face counseling regarding disease state, treatment, side effects and management.      Signed by: Nas Hoyos MD   13-Nov-2017

## 2021-06-14 NOTE — PROGRESS NOTES
Jack came to clinic this afternoon for start of treatment using Atezolizumab.  VSS.  Pt assessed.  He is painful and had trouble filling oxycodone prescription.  Order for 1 dose of oxycodone obtained and triage RN working to get pharmacy to fill script.  Port was accessed with good blood return and labs were drawn.  Lab results noted and pt met provision for treatment.  Pt has been consented.  He received treatment as ordered and tolerated it well while in clinic today.  Port was flushed, heparinized then deaccessed and site covered.  Jack d/c from clinic ambulatory and unaccompanied.  He is aware of his future appointment.

## 2021-06-14 NOTE — PROGRESS NOTES
I met with Jack today.  He had requested some resources for transportation.  His Dad typically brings him to [place] but he would like back up options if needed.  His girlfriend typically isn't able to assist with getting him to and from [place] with her work schedule.  I called the [employer] and [employer] today and registered Jack.  I provided written information, including phone numbers, on both programs as well.  I also provided a list of transportation resources for [facility] residents.  I explained many of these programs have fees.  I talked with him about [employer] and provided an application if he's interested in applying.  I explained the review process can take up to 3 weeks and requires a doctor signature.  I said I would be happy to help him complete the form and will get his doctor's signature.  He will review the information I provided and call me if he's interested in applying for [employer].    I asked if there's anything further I can assist with at this time.  I talked briefly about the [employer] program and he said meals have not been an issue for them.  He cannot think of any additional resources he needs at this time.  I invited calls if he would like further assistance with transportation or has any additional needs.

## 2021-06-14 NOTE — PROGRESS NOTES
[facility] Hematology and Oncology Progress Note    Patient: Jack Ricketts  MRN: [id number]  Date of Service: 12/18/2017           Reason for visit      1. Non-small cell lung cancer, unspecified laterality         Assessment     1. Jack is very pleasant 32 y.o. gentleman with  non-small cell type of a lung cancer with neuroendocrine differentiation initially stage IIIB and later on developed liver metastases, treated with concurrent chemoradiation therapy with cisplatin and etoposide  then given more radiation and weekly carboplatinum and Taxol.  He had recurrence documented February 2014 and has received 6 cycles of topotecan. In December 2014 started on maintenance Avastin.  PET scan in March 2015 showed questionable recurrence in the liver.  In August 2015 Biopsy confirmed NSCLC with neuroendocrine differentiation. Started on Alimta and Avastin.  He developed brain metastases September 2015 while on that.  Treatment changed to topotecan with Avastin.  In January 2016 PET scan showed progression again and at that time he got started on on pembrolizumab.  PET scan in August 2016 showed slight worsening in the right hilar area.  So in August 2016 he started on Opdivo.  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  He was actually on treatment break recovering from pneumonitis that he got admitted at [facility] with pulmonary embolism.  His CT scan showed progression of his lung cancer.  In December 2016 started on carboplatin and etoposide.  3 cycles.  Septic episodes after that needing intubation and pressors.  In the beginning of April 2017 came in with polyarthritis and on prednisone again.  In June 2017 started on ABraxane. Tolerated it well.  In July 2017 he had acute cholecystitis and needed his gall bladder removed.  We delayed his treatment for about a week and then resumed treatment.  So far he has had 3 cycles of it.  He is having some  wheezing and coughing.  He is using nebulizers etc.  He does get some coughing spells which can be pretty distressing for him.  No hemoptysis or blood-tinged sputum normally.  Recently seen by [facility].  He might be eligible for a clinical study later on.  Recommendation from [facility] oncologist was that he should start on TECCENTRIQ until he is cleared for that study. He started that on November 21, 2017. So far tolerating it OK.  2. Brain metastases which have been treated with CyberKnife l. 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  MRI now showing Left cerebral lesion growing in size and intensity.  Recently in September 2017 had CyberKnife therapy to that..  3. Hypothyroidism due to Pembrolizumab. On replacement.  4. Pulmonary embolism was on Lovenox.  Currently on no anticoagulation.  His current auscultation findings suggest that he may either have collapsed lung or significant pleural effusion on his right side.  5. Significant joint pains in the beginning of April 2017.  Better on prednisone.  6. C. difficile colitis diagnosed in April 2017.  7.   Adrenal insufficiency diagnosed in May 2017.    Plan     1. Continue treatment with TECCENTRIQ.    2. Continue on prednisone  replacement therapy.    3. Follow-up with Dr. Jackson left cerebellar lesion posttreatment.  He is scheduled for an MRI later on next week.  4.   Discussed with his father that his condition is looking worse.    Stage      Lung Cancer    Primary site: Lung (Right) small cell as well as non-small cell lung cancer adenocarcinoma    Clinical: Stage IIIA (T2b, N2, M0) signed by Nas Hoyos MD on 9/26/2014  1:17 PM    Summary: Stage IIIA (T2b, N2, M0)      History     Jack Ricketts is a very pleasant 32 y.o. old male with a history of what was thought to be small cell lung cancer, limited stage, in 09/2012 located in the mediastinum measuring 10 cm.  Biopsy confirmed CK7 positive, synaptophysin positive chromogranin  expressing cells.  Initial treatment involved chemoradiation therapy with cisplatin and -16.  Post treatment PET scan showed only a minimal response.  Rebiopsy revealed a component of nonsmall cell lung cancer as well, EGFR and ALK-1 mutation negative.  He was referred to Dr. Jose Amezcua [facility] who recommended Taxotere chemotherapy.  The patient opted for radiation therapy (CyberKnife) with radiosensitizing weekly carboplatin and Taxol chemotherapies initiated in June 2013.  He tolerated treatment quite well.  He had complete response to the treatment.       February, 2014, PET scan (compared to prior PET scan in November, 2013) revealed subcarinal node hypermetabolism compatible with active metastasis. This was also biopsied and proven to be malignant and suggestive of small cell type of cancer.  We started him on palliative topotecan chemotherapy day 1 through 5 every 3 weeks.  Cycle one was initiated on 03/04/2014 and he finished 6 cycles of that.  His PET scan after 3 cycles showed minimal response then after 6 cycles his PET scan showed continued but incomplete response.  He did need some transfusion after the fifth cycle.    After couple months break was resumed his treatment with topotecan and Avastin.  He had 3 cycles .  His PET scan after the 2nd cycle had shown complete response.  He started on that his third cycle but could not completed because of respiratory infections.  After that in December 2014 he started on Avastin as a single agent for maintenance.  He has been on that until March 2015 when PET scan that actually showed a questionable uptake in his liver as well as the subcarinal lymph node.  He got a CT-guided biopsy of the liver lesion which was non diagnostic. At the time of the biopsy they had trouble locating the lesion.  Then in August 2015 the liver lesions got bigger and we biopsied again and came back positive for neuroendocrine carcinoma of lung origin.  He was  started on treatment with Alimta and Avastin.  However he developed brain metastases in September.  Treated with CyberKnife therapy.  We change his treatment to topotecan and Avastin.  He has had couple cycles of that.  He became quite anemic and needed transfusion last week.  His brain MRI in the beginning of January 2016 showed improvement.  However his PET scan in January 2016 showed worsening in the liver lesion.    We tested his tumor for PDL-1 mutation and he did have favorable mutation for which pembrolizumab would be a targeted agent. He started on that in the end of January 2016.  He was noted to be hypothyroid after 3 cycles. Started on synthroid. He did notice depression which is better after synthroid. In August 2016 his PET scan showed progression. He was started on Opdivo. He did fine initially but then later on in October he started to have pneumonitis-type of picture.  It was felt to be secondary to Opdivo.    He was put on some steroid Opdivo was stopped.    He was admitted at [facility] second week of December 2016 with shortness of breath.  He was found to have pulmonary embolism as well as his CT scan showed progression of his lung cancer.  We started him on palliative chemotherapy with carboplatin and etoposide in December and he got 3 cycles of it.  He needed Neulasta.  He had a rough time after the third cycle.  He was admitted with pneumonia and other and problems.  In fact he needed to be intubated and was on pressors.  He has recovered from that.  He has been on observation since late February 2017.    Due to persistent disease on PET scan, started on Abraxane in June. He was admitted with RUQ pain and found to have acute cholecystitis and on July 4th he had his gall bladder removed. Feels better now. Resumed treatment. Noticing increased wheezing off and on.  He has finished 3 cycles of it.  His CT scan in the in September showed that he might not have had any good response to the  treatment.  Since that time he has had another episode or 2 where he has admitted in the hospital with pain control issues.  He is also not eating very well.    Most recently Dr. Jackson treated 1 of his brain metastases which seems to be getting worse on the MRI.  Dr. Jackson treated him with a single fraction of radiation by CyberKnife.  He handled the CyberKnife treatment well.  His Abraxane has been on hold since September 2017.  I did send his tissue for strata testing however it did not return any targetable mutation.  He was referred to [facility] for evaluation for any clinical trials.  He has met with them and there is a clinical trial for which he might be eligible but the oncologist who he saw at [facility] recommend that he should probably proceed with TECCENTRIQ since there will be a delay in getting started on that clinical study.  He started that last week of November 2017. He seems to have tolerated that well.    Past Medical History     Bronchitis, lung cancer, renal insufficiency    Family History   Problem Relation Age of Onset     Breast cancer Mother 80     No Medical Problems Father      No Medical Problems Brother      Lung cancer Brother 60     Social History   Substance Use Topics     Smoking status: Never Smoker     Smokeless tobacco: Never Used     Alcohol use No       Stage      Lung Cancer    Primary site: Lung (Right)    Clinical: Stage IIIA (T2b, N2, M1b) signed by Nas Hoyos MD on 8/20/2015 10:08 AM    Summary: Stage IIIA (T2b, N2, M1b)     Treatment History     1. September 2012 cisplatin and -16 along with concurrent radiation therapy for 2 cycles.  2. Weekly carboplatin and Taxol along with radiation therapy in June 2013.  3. March 2014 topotecan day 1 through 5 every 3 weeks for 6 cycles  4. September 2014 topotecan and Avastin ×3 cycles.  Third cycle was cut Short.  5. December 2014 Avastin 15 mg/kg every 3 weeks maintenance treatment.  6. August 2015 Avastin and  Alimta.  7. Developed brain metastases in September 2015 treated with CyberKnife.  8. November 2015 started on topotecan with Avastin.  PET scan in January 2016 showed progression.  9. February 2016 started on pembrolizumab.  10. Opdivo started in August 15, 2016.  11. Carboplatin and etoposide 12/05/16 - 02/20/17 completed 3 cycles   12.  Abraxane with Avastin June 2017-September 2017. CT shows slight progression.  13.       Tecentriq November 2017.    Review of Systems   Constitutional  Constitutional (WDL): Exceptions to WDL  Fatigue: Fatigue not relieved by rest, limiting self care ADL  Weight Loss: to <10% from baseline, intervention not indicated (down 6 lbs)  Neurosensory  Neurosensory (WDL): Exceptions to WDL  Cardiovascular  Cardiovascular (WDL): All cardiovascular elements are within defined limits  Pulmonary  Cough: Mild symptoms, nonprescription intervention indicated  Dyspnea: Shortness of breath at rest, limiting self care ADL (2L O2)  Hypoxia: Decreased oxygen saturation with exercise (e.g., pulse oximeter <88%), intermittent supplemental oxygen    Gastrointestinal  Gastrointestinal (WDL): Exceptions to WDL  Anorexia: Oral intake altered without significant weight loss or malnutrition, oral nutritional supplements indicated  Constipation: Occasional or intermittent symptoms, occasional use of stool softeners, laxatives, dietary modification, or enema  Dysphagia: Symptomatic and altered eating/swallowing  Dry Mouth: Symptomatic (e.g., dry or thick saliva) without significant dietary alteration, unstimulated saliva flow >0.2 ml/min  Genitourinary  Genitourinary (WDL): All genitourinary elements are within defined limits  Integumentary  Integumentary (WDL): Exceptions to WDL  Alopecia: Hair loss of up to 50% of normal for that individual that is not obvious from a distance but only on close inspection, a different hair style may be required to cover the hair loss but it does not require a wig or hair  piece to camouflage  Patient Coping  Patient Coping: Accepting  ECOG Performance   1  Pain Status  Currently in Pain: Yes  Accompanied by  Accompanied by: Alone      Vital Signs     Vitals:    12/18/17 1348   BP: 118/72   Pulse: (!) 113   Temp: 99.1  F (37.3  C)   SpO2: 96%       Physical Exam     Constitutional: Oriented to person, place, and time and appears well-developed.   HEENT:  Normocephalic and atraumatic.  Eyes: Conjunctivae and EOM are normal. Pupils are equal, round, and reactive to light. No discharge.  No scleral icterus. Nose normal. Mouth/Throat: Oropharynx is clear and moist. No oropharyngeal exudate.    NECK: Normal range of motion. Neck supple. No JVD present. No tracheal deviation present.  Scar from surgery and the neck is present.   CARDIOVASCULAR: Elevated heart rate, regular rhythm and intact distal pulses.  Exam reveals no gallop and no friction rub.  Systolic murmur present.  PULMONARY: He is slightly tachypneic.  Decreased air entry on the right side with dull percussion note.  Left side seems to be normal on auscultation and percussion.Bronchial breath sounds on the right apex.  ABDOMEN: Soft. Bowel sounds are normal. No distension and no mass. Fair amount of RUQ tenderness. There is no rebound and no guarding. No HSM.  MUSCULOSKELETAL: Normal range of motion. No edema and no tenderness. Mild kyphosis, no tenderness.  LYMPH NODES: Has no cervical, supraclavicular, axillary and groin adenopathy.   NEUROLOGICAL: Alert and oriented to person, place, and time. No cranial nerve deficit.  Normal muscle tone. Coordination normal.   GENITOURINARY: Deferred exam.  SKIN: Skin is warm and dry. Rash on back of the neck . No erythema. No pallor.   EXTREMITIES: No cyanosis, no clubbing, no edema. No Deformity.  PSYCHIATRIC: He is slight anxiety.      Lab Results     Results for orders placed or performed in visit on 12/18/17   Comprehensive Metabolic Panel   Result Value Ref Range    Sodium 139 136 -  145 mmol/L    Potassium 3.6 3.5 - 5.0 mmol/L    Chloride 93 (L) 98 - 107 mmol/L    CO2 36 (H) 22 - 31 mmol/L    Anion Gap, Calculation 10 5 - 18 mmol/L    Glucose 98 70 - 125 mg/dL    BUN 7 (L) 8 - 22 mg/dL    Creatinine 0.76 0.70 - 1.30 mg/dL    GFR MDRD Af Amer >60 >60 mL/min/1.73m2    GFR MDRD Non Af Amer >60 >60 mL/min/1.73m2    Bilirubin, Total 0.9 0.0 - 1.0 mg/dL    Calcium 9.5 8.5 - 10.5 mg/dL    Protein, Total 7.2 6.0 - 8.0 g/dL    Albumin 2.6 (L) 3.5 - 5.0 g/dL    Alkaline Phosphatase 260 (H) 45 - 120 U/L    AST 89 (H) 0 - 40 U/L    ALT 42 0 - 45 U/L   HM1 (CBC with Diff)   Result Value Ref Range    WBC 5.7 4.0 - 11.0 thou/uL    RBC 3.38 (L) 4.40 - 6.20 mill/uL    Hemoglobin 9.1 (L) 14.0 - 18.0 g/dL    Hematocrit 29.0 (L) 40.0 - 54.0 %    MCV 86 80 - 100 fL    MCH 26.9 (L) 27.0 - 34.0 pg    MCHC 31.4 (L) 32.0 - 36.0 g/dL    RDW 15.2 (H) 11.0 - 14.5 %    Platelets 229 140 - 440 thou/uL    MPV 9.1 8.5 - 12.5 fL    Neutrophils % 76 (H) 50 - 70 %    Lymphocytes % 14 (L) 20 - 40 %    Monocytes % 9 2 - 10 %    Eosinophils % 1 0 - 6 %    Basophils % 0 0 - 2 %    Neutrophils Absolute 4.4 2.0 - 7.7 thou/uL    Lymphocytes Absolute 0.8 0.8 - 4.4 thou/uL    Monocytes Absolute 0.5 0.0 - 0.9 thou/uL    Eosinophils Absolute 0.1 0.0 - 0.4 thou/uL    Basophils Absolute 0.0 0.0 - 0.2 thou/uL     *Note: Due to a large number of results and/or encounters for the requested time period, some results have not been displayed. A complete set of results can be found in Results Review.         Distress Assessment       Distress Assessment Score: 5 (health/not feeling well/financial)     Imaging Results       Mr Brain With Without Contrast    Result Date: 11/27/2017  HEAD MRI WITHOUT AND WITH IV CONTRAST 11/27/2017 11:44 AM INDICATION: Follow up metastatic lung cancer. Previous CyberKnife therapy. TECHNIQUE: Head MRI without and with intravenous contrast. Perfusion was attempted however was technically inadequate. CONTRAST: Gadavist  10 mL. COMPARISON: MRI 9/18/2017. FINDINGS: No evidence for acute or subacute infarct on the diffusion sequence. No acute hemorrhage. Some chronic blood products associated with the right frontal and right periventricular metastatic deposits. No new hydrocephalus. Previously noted anterior right frontal metastatic deposit is again seen appears minimally larger measuring approximately 12 mm x 10 mm. Stable mild surrounding FLAIR signal change. The previously noted irregularly enhancing right periventricular lesion has regressed in size and contrast enhancement mildly from previous study. Stable surrounding FLAIR changes. Previously noted left periventricular enhancing lesion has decreased moderately in size in the interval and shows T1 hyperintensity on precontrast images. A small 2 to 3 mm left frontal enhancing lesion is stable from prior. A 6.5 mm superficial enhancing lesion is now identified along the posterior left temporal lobe cortex seen on series 17 image 9. A 2 mm enhancing lesion along the atrial region of the right lateral ventricle is also now identified. No new posterior fossa lesion. The major intracranial vascular flow voids are maintained. Mastoids clear. Paranasal sinuses grossly clear. Grossly normal orbits.     CONCLUSION: 1.  Again noted are several intracranial metastatic deposits. The previously noted irregularly enhancing deposit in the right periventricular region has decreased mildly in size from 9/18/2017. Stable surrounding FLAIR change. 2.  A peripherally enhancing approximately 12 mm enhancing lesion in the anterior superficial right frontal lobe is similar to minimally larger than on prior study with stable FLAIR change. 3.  A small enhancing deposit in the left periventricular region seen previously has decreased moderately in size and now shows T1 hyperintensity. 4.  There is now a 6 mm enhancing lesion along the posterolateral left temporal lobe superficially as well as a 2 mm  enhancing lesion in the atrium of the right lateral ventricle. Both of these lesions were barely perceptible on the prior study and have enlarged. 5.  Stable 2-3 mm enhancing lesion in the left frontal lobe. 6.  No severe edema or mass effect with any of these lesions. 7.  No new infarct, hydrocephalus or acute hemorrhage. Some chronic hemorrhage with the right frontal and right periventricular lesions again seen.    Ct Chest Abdomen Pelvis Without Oral With Iv Contrast    Result Date: 11/22/2017  CT CHEST, ABDOMEN, AND PELVIS 11/22/2017 1:25 PM      INDICATION: Neoplasm: abdomen, metastatic, rx monitor or f/u TECHNIQUE: CT chest, abdomen, and pelvis. Dose reduction techniques were used. IV CONTRAST: Iohexol (Omni) 100 mL COMPARISON: CTA chest 11/1/2017 and 9/21/2017. CT abdomen and pelvis 9/14/2017. MRCP 7/4/2017. FINDINGS: CHEST: The entire right lung is now densely consolidated which makes measurement of the perihilar mass difficult. The mass is probably mildly larger compared to the recent CTA chest 11/1/2017. As before, the mass encases and attenuates the airways and pulmonary arteries of the right lung and extends into the paratracheal and subcarinal mediastinum. Fiducial markers are unchanged in positions. Necrotic lymph nodes persist in the left supraclavicular and prevascular mediastinal stations as well as near the distal esophagus. A prevascular lymph node has decreased in size to 2.2 x 1.6 cm, previously 3.3 x 2.3 cm (image 10 of series 2). The left supraclavicular node is now 2.3 x 2.0 cm, previously 2.1 x 1.7 cm (image 4 of series 2). A lymph node near the distal esophagus is now 1.5 x 1.4 cm, previously 1.2 x 1.0 cm. Perihilar nodularity segment of the left lower lobe is not significantly changed. For example, a dominant nodule is now 1.6 x 1.1 cm, previously 1.5 x 1.1 cm (e.g. images 33-40 of series 3). Left internal jugular venous Port-A-Cath terminates in the right atrium. Small-to-moderate  pericardial effusion has mildly increased in volume. Right pleural effusion is small.  ABDOMEN: Numerous hepatic metastases have significantly increased in size and number. For example a dominant mass at the dome now measures 3.8 x 3.7 cm, previously 3.1 x 3.0 cm on 11/1/2017. A mass of segment IV is now 5.5 x 4.4 cm, previously 4.2 x 3.6 cm (image 44 of series 2). Postcholecystectomy. Spleen, pancreas, adrenal glands, and kidneys are negative. PELVIS: Loops of bowel, urinary bladder, prostate gland, and rectum are negative. MUSCULOSKELETAL: Negative.     CONCLUSION: 1.  Progression of disease. 2.  Entire right lung now densely consolidated which makes measurement of the perihilar mass difficult. As before, the mass encases and attenuates the airways and pulmonary arteries of the right lung. 3.  Significant increase in size and number of hepatic metastases. 4.  A portion of the mediastinal and left supraclavicular philip metastases have enlarged. 5.  Stable cluster of nodules of the left perihilar region also suspicious for metastases. 6.  Increase in volume of small to moderate pericardial effusion.          Signed by: Nas Hoyos MD

## 2021-06-15 NOTE — PROGRESS NOTES
HPI: This patient is a 33yo unfortunate male with progressive and metastatic non-small cell lung cancer who presents for evaluation of hoarseness and swallowing at the request of Dr. Amador. He has some chronic hoarseness that comes and goes in addition to breathing issues as expected with his underlying diagnosis. The issue he is really here for today is dysphagia. He has an issue his pills getting stuck in his esophagus, sometimes he has to wait for them to dissolve some before he can swallow something else to push them down. He denies issues with food, however. He had a swallow study showing esophageal compression by his tumor and he was told that ENT could put a balloon into his esophagus to try to open it.     Past medical history, surgical history, social history, family history, medications, and allergies have been reviewed with the patient and are documented above.    Review of Systems: a 10-system review was performed. Pertinent positives are noted in the HPI and on a separate scanned document in the chart.    PHYSICAL EXAMINATION:  GEN: no acute distress, normocephalic. Chronically-ill appearing and on NC O2  EYES: extraocular movements are intact, pupils are equal and round. Sclera clear.   EARS: auricles are normally formed. The external auditory canals are clear with minimal to no cerumen. Tympanic membranes are intact bilaterally with no signs of infection, effusion, retractions, or perforations.  NOSE: anterior nares are patent. The septum is non-obstructing.  OC/OP: clear, dentition is in good repair. The tongue and palate are fully mobile and symmetric.   NECK: soft and supple. No lymphadenopathy or masses. Airway is midline.  NEURO: CN II-XII are intact bilaterally. alert and oriented x 3. No nystagmus. Gait is normal.    CT CHEST 11/2017:  CONCLUSION:  1.  Progression of disease.  2.  Entire right lung now densely consolidated which makes measurement of the perihilar mass difficult. As before, the  mass encases and attenuates the airways and pulmonary arteries of the right lung.  3.  Significant increase in size and number of hepatic metastases.  4.  A portion of the mediastinal and left supraclavicular philip metastases have enlarged.  5.  Stable cluster of nodules of the left perihilar region also suspicious for metastases.  6.  Increase in volume of small to moderate pericardial effusion.    XR SWALLOW 11/2017:  CONCLUSION:  1.  Smoothly tapering moderate mid esophageal stricture may relate to extrinsic tumor or post therapeutic changes after radiation.    MEDICAL DECISION-MAKING: This patient is a 31yo M with esophageal dysphagia for his pills. Explained that ENT does not perform any balloon procedures of the esophagus. His issue is in the mid esophagus and rigid esophagoscopy would not be advised given the overall scenario. Perhaps input from GI would be reasonable in this case. Referral placed.

## 2021-06-15 NOTE — PROGRESS NOTES
[facility] Hematology and Oncology Progress Note    Patient: Jack Ricketts  MRN: [id number]  Date of Service: 1/8/2018           Reason for visit      1. Brain metastases    2. Non-small cell lung cancer, unspecified laterality         Assessment     1. Jack is very pleasant 32 y.o. gentleman with  non-small cell type of a lung cancer with neuroendocrine differentiation initially stage IIIB and later on developed liver metastases, treated with concurrent chemoradiation therapy with cisplatin and etoposide  then given more radiation and weekly carboplatinum and Taxol.  He had recurrence documented February 2014 and has received 6 cycles of topotecan. In December 2014 started on maintenance Avastin.  PET scan in March 2015 showed questionable recurrence in the liver.  In August 2015 Biopsy confirmed NSCLC with neuroendocrine differentiation. Started on Alimta and Avastin.  He developed brain metastases September 2015 while on that.  Treatment changed to topotecan with Avastin.  In January 2016 PET scan showed progression again and at that time he got started on on pembrolizumab.  PET scan in August 2016 showed slight worsening in the right hilar area.  So in August 2016 he started on Opdivo.  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  He was actually on treatment break recovering from pneumonitis that he got admitted at [facility] with pulmonary embolism.  His CT scan showed progression of his lung cancer.  In December 2016 started on carboplatin and etoposide.  3 cycles.  Septic episodes after that needing intubation and pressors.  In the beginning of April 2017 came in with polyarthritis and on prednisone again.  In June 2017 started on ABraxane. Tolerated it well.  In July 2017 he had acute cholecystitis and needed his gall bladder removed.  We delayed his treatment for about a week and then resumed treatment.  So far he has had 3 cycles  of it.  He is having some wheezing and coughing.  He is using nebulizers etc.  He does get some coughing spells which can be pretty distressing for him.  No hemoptysis or blood-tinged sputum normally.  Recently seen by [facility].  He might be eligible for a clinical study later on.  Recommendation from [facility] oncologist was that he should start on TECCENTRIQ until he is cleared for that study. He started that on November 21, 2017. So far tolerating it OK.  2. Brain metastases which have been treated with CyberKnife l. 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  MRI now showing Left cerebral lesion growing in size and intensity.  Recently in September 2017 had CyberKnife therapy to that..  3. Elevated LFT's. Concerning for progression. Need to r/o biliary obstruction from non malignant reasons.  4. Pulmonary embolism was on Lovenox.  Currently on no anticoagulation.  His current auscultation findings suggest that he may either have collapsed lung or significant pleural effusion on his right side.  5. Hypothyroidism due to Pembrolizumab. On replacement.  6. C. difficile colitis diagnosed in April 2017.  7.   Adrenal insufficiency diagnosed in May 2017.    Plan     1. Hold treatment with TECCENTRIQ.  Check US of liver. May need CT.  2. Continue on prednisone  replacement therapy.    3. Follow-up with Dr. Jackson left cerebellar lesion posttreatment.    4.   Discussed with him about liver friendly diet and medications.  5. Lactulose for constipation.  6. Discussed with patient that this may be indicative of progression.    Stage      Lung Cancer    Primary site: Lung (Right) small cell as well as non-small cell lung cancer adenocarcinoma    Clinical: Stage IIIA (T2b, N2, M0) signed by Nas Hoyos MD on 9/26/2014  1:17 PM    Summary: Stage IIIA (T2b, N2, M0)      History     Jack Ricketts is a very pleasant 32 y.o. old male with a history of what was thought to be small cell lung cancer,  limited stage, in 09/2012 located in the mediastinum measuring 10 cm.  Biopsy confirmed CK7 positive, synaptophysin positive chromogranin expressing cells.  Initial treatment involved chemoradiation therapy with cisplatin and -16.  Post treatment PET scan showed only a minimal response.  Rebiopsy revealed a component of nonsmall cell lung cancer as well, EGFR and ALK-1 mutation negative.  He was referred to Dr. Jose Amezcua [facility] who recommended Taxotere chemotherapy.  The patient opted for radiation therapy (CyberKnife) with radiosensitizing weekly carboplatin and Taxol chemotherapies initiated in June 2013.  He tolerated treatment quite well.  He had complete response to the treatment.       February, 2014, PET scan (compared to prior PET scan in November, 2013) revealed subcarinal node hypermetabolism compatible with active metastasis. This was also biopsied and proven to be malignant and suggestive of small cell type of cancer.  We started him on palliative topotecan chemotherapy day 1 through 5 every 3 weeks.  Cycle one was initiated on 03/04/2014 and he finished 6 cycles of that.  His PET scan after 3 cycles showed minimal response then after 6 cycles his PET scan showed continued but incomplete response.  He did need some transfusion after the fifth cycle.    After couple months break was resumed his treatment with topotecan and Avastin.  He had 3 cycles .  His PET scan after the 2nd cycle had shown complete response.  He started on that his third cycle but could not completed because of respiratory infections.  After that in December 2014 he started on Avastin as a single agent for maintenance.  He has been on that until March 2015 when PET scan that actually showed a questionable uptake in his liver as well as the subcarinal lymph node.  He got a CT-guided biopsy of the liver lesion which was non diagnostic. At the time of the biopsy they had trouble locating the lesion.  Then in August  2015 the liver lesions got bigger and we biopsied again and came back positive for neuroendocrine carcinoma of lung origin.  He was started on treatment with Alimta and Avastin.  However he developed brain metastases in September.  Treated with CyberKnife therapy.  We change his treatment to topotecan and Avastin.  He has had couple cycles of that.  He became quite anemic and needed transfusion last week.  His brain MRI in the beginning of January 2016 showed improvement.  However his PET scan in January 2016 showed worsening in the liver lesion.    We tested his tumor for PDL-1 mutation and he did have favorable mutation for which pembrolizumab would be a targeted agent. He started on that in the end of January 2016.  He was noted to be hypothyroid after 3 cycles. Started on synthroid. He did notice depression which is better after synthroid. In August 2016 his PET scan showed progression. He was started on Opdivo. He did fine initially but then later on in October he started to have pneumonitis-type of picture.  It was felt to be secondary to Opdivo.    He was put on some steroid Opdivo was stopped.    He was admitted at [facility] second week of December 2016 with shortness of breath.  He was found to have pulmonary embolism as well as his CT scan showed progression of his lung cancer.  We started him on palliative chemotherapy with carboplatin and etoposide in December and he got 3 cycles of it.  He needed Neulasta.  He had a rough time after the third cycle.  He was admitted with pneumonia and other and problems.  In fact he needed to be intubated and was on pressors.  He has recovered from that.  He has been on observation since late February 2017.    Due to persistent disease on PET scan, started on Abraxane in June. He was admitted with RUQ pain and found to have acute cholecystitis and on July 4th he had his gall bladder removed. Feels better now. Resumed treatment. Noticing increased wheezing off  and on.  He has finished 3 cycles of it.  His CT scan in the in September showed that he might not have had any good response to the treatment.  Since that time he has had another episode or 2 where he has admitted in the hospital with pain control issues.  He is also not eating very well.    Most recently Dr. Jackson treated 1 of his brain metastases which seems to be getting worse on the MRI.  Dr. Jackson treated him with a single fraction of radiation by CyberKnife.  He handled the CyberKnife treatment well.  His Abraxane has been on hold since September 2017.  I did send his tissue for strata testing however it did not return any targetable mutation.  He was referred to [facility] for evaluation for any clinical trials.  He has met with them and there is a clinical trial for which he might be eligible but the oncologist who he saw at [facility] recommend that he should probably proceed with TECCENTRIQ since there will be a delay in getting started on that clinical study.  He started that last week of November 2017. He seems to have tolerated that well. He has had 2 doses. Comes in for f/u. He is quite weak. C/o pain in RUQ. Appetite is poor. Having some constipation and urine is dark in color.    Past Medical History     Bronchitis, lung cancer, renal insufficiency    Family History   Problem Relation Age of Onset     Breast cancer Mother 80     No Medical Problems Father      No Medical Problems Brother      Lung cancer Brother 60     Social History   Substance Use Topics     Smoking status: Never Smoker     Smokeless tobacco: Never Used     Alcohol use No       Stage      Lung Cancer    Primary site: Lung (Right)    Clinical: Stage IIIA (T2b, N2, M1b) signed by Nas Hoyos MD on 8/20/2015 10:08 AM    Summary: Stage IIIA (T2b, N2, M1b)     Treatment History     1. September 2012 cisplatin and -16 along with concurrent radiation therapy for 2 cycles.  2. Weekly carboplatin and Taxol along with radiation  therapy in June 2013.  3. March 2014 topotecan day 1 through 5 every 3 weeks for 6 cycles  4. September 2014 topotecan and Avastin ×3 cycles.  Third cycle was cut Short.  5. December 2014 Avastin 15 mg/kg every 3 weeks maintenance treatment.  6. August 2015 Avastin and Alimta.  7. Developed brain metastases in September 2015 treated with CyberKnife.  8. November 2015 started on topotecan with Avastin.  PET scan in January 2016 showed progression.  9. February 2016 started on pembrolizumab.  10. Opdivo started in August 15, 2016.  11. Carboplatin and etoposide 12/05/16 - 02/20/17 completed 3 cycles   12.  Abraxane with Avastin June 2017-September 2017. CT shows slight progression.  13.       Tecentriq November 2017.    Review of Systems   Constitutional  Constitutional (WDL): Exceptions to WDL  Fatigue: Fatigue not relieved by rest - Limiting instrumental ADL (to severe)  Weight Loss: to <10% from baseline, intervention not indicated (down 9 lbs)  Neurosensory  Neurosensory (WDL): All neurosensory elements are within defined limits  Cardiovascular  Cardiovascular (WDL): Exceptions to WDL  Edema: Yes  Edema Limbs: 5 - 10% inter-limb discrepancy in volume or circumference at point of greatest visible difference, swelling or obscuration of anatomic architecture on close inspection (feet)  Pulmonary  Cough: Mild symptoms, nonprescription intervention indicated  Dyspnea: Shortness of breath at rest, limiting self care ADL (on 1L)  Hypoxia: Decreased oxygen saturation with exercise (e.g., pulse oximeter <88%), intermittent supplemental oxygen    Gastrointestinal  Gastrointestinal (WDL): Exceptions to WDL  Anorexia: Oral intake altered without significant weight loss or malnutrition, oral nutritional supplements indicated  Constipation: Persistent symptoms with regular use of laxatives or enemas, limiting instrumental ADL  Dysphagia: Symptomatic and altered eating/swallowing (going to see an ENT)  Dry Mouth: Symptomatic  (e.g., dry or thick saliva) without significant dietary alteration, unstimulated saliva flow >0.2 ml/min  Genitourinary  Genitourinary (WDL): Exceptions to WDL (darker color with odor)  Integumentary  Integumentary (WDL): Exceptions to WDL  Alopecia: Hair loss of up to 50% of normal for that individual that is not obvious from a distance but only on close inspection, a different hair style may be required to cover the hair loss but it does not require a wig or hair piece to camouflage  Patient Coping  Patient Coping: Accepting  ECOG Performance   1  Pain Status  Currently in Pain: Yes  Accompanied by  Accompanied by: Alone      Vital Signs     Vitals:    01/08/18 1334   BP: 118/76   Pulse: (!) 126   Temp: 97.9  F (36.6  C)   SpO2: 94%       Physical Exam     Constitutional: Oriented to person, place, and time and appears well-developed.   HEENT:  Normocephalic and atraumatic.  Eyes: Conjunctivae and EOM are normal. Pupils are equal, round, and reactive to light. No discharge.  No scleral icterus. Nose normal. Mouth/Throat: Oropharynx is clear and moist. No oropharyngeal exudate.    NECK: Normal range of motion. Neck supple. No JVD present. No tracheal deviation present.  Scar from surgery and the neck is present.   CARDIOVASCULAR: Elevated heart rate, regular rhythm and intact distal pulses.  Exam reveals no gallop and no friction rub.  Systolic murmur present.  PULMONARY: He is slightly tachypneic.  Decreased air entry on the right side with dull percussion note.  Left side seems to be normal on auscultation and percussion.Bronchial breath sounds on the right apex.  ABDOMEN: Soft. Bowel sounds are normal. No distension and no mass. Fair amount of RUQ tenderness. There is no rebound and no guarding. No HSM.  MUSCULOSKELETAL: Normal range of motion. No edema and no tenderness. Mild kyphosis, no tenderness.  LYMPH NODES: Has no cervical, supraclavicular, axillary and groin adenopathy.   NEUROLOGICAL: Alert and  oriented to person, place, and time. No cranial nerve deficit.  Normal muscle tone. Coordination normal.   GENITOURINARY: Deferred exam.  SKIN: Skin is warm and dry. Rash on back of the neck . No erythema. No pallor.   EXTREMITIES: No cyanosis, no clubbing, no edema. No Deformity.  PSYCHIATRIC: He is slight anxiety.      Lab Results     Results for orders placed or performed in visit on 01/08/18   Comprehensive Metabolic Panel   Result Value Ref Range    Sodium 137 136 - 145 mmol/L    Potassium 3.7 3.5 - 5.0 mmol/L    Chloride 89 (L) 98 - 107 mmol/L    CO2 36 (H) 22 - 31 mmol/L    Anion Gap, Calculation 12 5 - 18 mmol/L    Glucose 99 70 - 125 mg/dL    BUN 11 8 - 22 mg/dL    Creatinine 0.66 (L) 0.70 - 1.30 mg/dL    GFR MDRD Af Amer >60 >60 mL/min/1.73m2    GFR MDRD Non Af Amer >60 >60 mL/min/1.73m2    Bilirubin, Total 3.1 (H) 0.0 - 1.0 mg/dL    Calcium 9.5 8.5 - 10.5 mg/dL    Protein, Total 7.0 6.0 - 8.0 g/dL    Albumin 2.3 (L) 3.5 - 5.0 g/dL    Alkaline Phosphatase 495 (H) 45 - 120 U/L    AST 77 (H) 0 - 40 U/L    ALT 57 (H) 0 - 45 U/L   HM1 (CBC with Diff)   Result Value Ref Range    WBC 7.0 4.0 - 11.0 thou/uL    RBC 3.69 (L) 4.40 - 6.20 mill/uL    Hemoglobin 9.3 (L) 14.0 - 18.0 g/dL    Hematocrit 30.3 (L) 40.0 - 54.0 %    MCV 82 80 - 100 fL    MCH 25.2 (L) 27.0 - 34.0 pg    MCHC 30.7 (L) 32.0 - 36.0 g/dL    RDW 17.1 (H) 11.0 - 14.5 %    Platelets 214 140 - 440 thou/uL    MPV 9.6 8.5 - 12.5 fL    Neutrophils % 78 (H) 50 - 70 %    Lymphocytes % 11 (L) 20 - 40 %    Monocytes % 11 (H) 2 - 10 %    Eosinophils % 0 0 - 6 %    Basophils % 0 0 - 2 %    Neutrophils Absolute 5.4 2.0 - 7.7 thou/uL    Lymphocytes Absolute 0.7 (L) 0.8 - 4.4 thou/uL    Monocytes Absolute 0.8 0.0 - 0.9 thou/uL    Eosinophils Absolute 0.0 0.0 - 0.4 thou/uL    Basophils Absolute 0.0 0.0 - 0.2 thou/uL     *Note: Due to a large number of results and/or encounters for the requested time period, some results have not been displayed. A complete set of  results can be found in Results Review.         Distress Assessment       Distress Assessment Score: 4 (health, not feeling well financial)     Imaging Results       No results found.        Signed by: Nas Hoyos MD

## 2021-06-15 NOTE — PROGRESS NOTES
[facility] Hematology and Oncology Progress Note    Patient: Jack Ricketts  MRN: [id number]  Date of Service: 01/15/2018           Reason for visit      1. Non-small cell lung cancer, unspecified laterality    2. Brain metastases    3. Primary malignant neoplasm of lung metastatic to other site, unspecified laterality         Assessment     1. Jack is very pleasant 32 y.o. gentleman with  non-small cell type of a lung cancer with neuroendocrine differentiation initially stage IIIB and later on developed liver metastases, treated with concurrent chemoradiation therapy with cisplatin and etoposide  then given more radiation and weekly carboplatinum and Taxol.  He had recurrence documented February 2014 and has received 6 cycles of topotecan. In December 2014 started on maintenance Avastin.  PET scan in March 2015 showed questionable recurrence in the liver.  In August 2015 Biopsy confirmed NSCLC with neuroendocrine differentiation. Started on Alimta and Avastin.  He developed brain metastases September 2015 while on that.  Treatment changed to topotecan with Avastin.  In January 2016 PET scan showed progression again and at that time he got started on on pembrolizumab.  PET scan in August 2016 showed slight worsening in the right hilar area.  So in August 2016 he started on Opdivo.  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  He was actually on treatment break recovering from pneumonitis that he got admitted at [facility] with pulmonary embolism.  His CT scan showed progression of his lung cancer.  In December 2016 started on carboplatin and etoposide.  3 cycles.  Septic episodes after that needing intubation and pressors.  In the beginning of April 2017 came in with polyarthritis and on prednisone again.  In June 2017 started on ABraxane. Tolerated it well.  In July 2017 he had acute cholecystitis and needed his gall bladder removed.  We  delayed his treatment for about a week and then resumed treatment.  So far he has had 3 cycles of it.  He is having some wheezing and coughing.  He is using nebulizers etc.  He does get some coughing spells which can be pretty distressing for him.  No hemoptysis or blood-tinged sputum normally.  Recently seen by [facility].  He might be eligible for a clinical study later on.  Recommendation from [facility] oncologist was that he should start on TECCENTRIQ until he is cleared for that study. He started that on November 21, 2017. So far tolerating it OK.  However the ultrasound of the liver is showing that he has progression of liver metastases.  2. Brain metastases which have been treated with CyberKnife l. 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  MRI now showing Left cerebral lesion growing in size and intensity.  Recently in September 2017 had CyberKnife therapy to that..  3. Elevated LFT's. Concerning for progression. Need to r/o biliary obstruction from non malignant reasons.  4. Pulmonary embolism was on Lovenox.  Currently on no anticoagulation.  His current auscultation findings suggest that he may either have collapsed lung or significant pleural effusion on his right side.  5. Hypothyroidism due to Pembrolizumab. On replacement.  6. C. difficile colitis diagnosed in April 2017.  7.   Adrenal insufficiency diagnosed in May 2017.    Plan     1. I had very long discussion with Jack and his father.  I discussed with him that his condition is progressing which means it is getting worse.  I really do not have any other therapy which is viable enough to give him any meaningful clinical benefit.  He has been through a lot of chemotherapy and for the last 1 year any chemotherapy that we tried was quite toxic for him.  His performance status is already close to 2.  He already has jaundice.  So he is not a candidate for any cytotoxic chemotherapy that is likely to help as a second line therapy for  non-small cell or small cell lung cancer.  I urged him to consider hospice care.  I went in great detail about why no therapy is better than any chemotherapy at this time.  He is not sure what he wants to do at this time.  I think he wants to get his MRI done and then he will probably talk to me again with hospice.  At this point I decided not to give any more TECCENTRIQ immunotherapy.  2. Continue on prednisone  replacement therapy.    3. Follow-up with Dr. Jackson left cerebellar lesion posttreatment.    4.   We will follow-up with him after a week to see what his decision is.  I also urged him to call us back if his condition worsens.    Stage      Lung Cancer    Primary site: Lung (Right) small cell as well as non-small cell lung cancer adenocarcinoma    Clinical: Stage IIIA (T2b, N2, M0) signed by Nas Hoyos MD on 9/26/2014  1:17 PM    Summary: Stage IIIA (T2b, N2, M0)      History     Jack Ricketts is a very pleasant 32 y.o. old male with a history of what was thought to be small cell lung cancer, limited stage, in 09/2012 located in the mediastinum measuring 10 cm.  Biopsy confirmed CK7 positive, synaptophysin positive chromogranin expressing cells.  Initial treatment involved chemoradiation therapy with cisplatin and -16.  Post treatment PET scan showed only a minimal response.  Rebiopsy revealed a component of nonsmall cell lung cancer as well, EGFR and ALK-1 mutation negative.  He was referred to Dr. Jose Amezcua [facility] who recommended Taxotere chemotherapy.  The patient opted for radiation therapy (CyberKnife) with radiosensitizing weekly carboplatin and Taxol chemotherapies initiated in June 2013.  He tolerated treatment quite well.  He had complete response to the treatment.       February, 2014, PET scan (compared to prior PET scan in November, 2013) revealed subcarinal node hypermetabolism compatible with active metastasis. This was also biopsied and proven to be malignant  and suggestive of small cell type of cancer.  We started him on palliative topotecan chemotherapy day 1 through 5 every 3 weeks.  Cycle one was initiated on 03/04/2014 and he finished 6 cycles of that.  His PET scan after 3 cycles showed minimal response then after 6 cycles his PET scan showed continued but incomplete response.  He did need some transfusion after the fifth cycle.    After couple months break was resumed his treatment with topotecan and Avastin.  He had 3 cycles .  His PET scan after the 2nd cycle had shown complete response.  He started on that his third cycle but could not completed because of respiratory infections.  After that in December 2014 he started on Avastin as a single agent for maintenance.  He has been on that until March 2015 when PET scan that actually showed a questionable uptake in his liver as well as the subcarinal lymph node.  He got a CT-guided biopsy of the liver lesion which was non diagnostic. At the time of the biopsy they had trouble locating the lesion.  Then in August 2015 the liver lesions got bigger and we biopsied again and came back positive for neuroendocrine carcinoma of lung origin.  He was started on treatment with Alimta and Avastin.  However he developed brain metastases in September.  Treated with CyberKnife therapy.  We change his treatment to topotecan and Avastin.  He has had couple cycles of that.  He became quite anemic and needed transfusion last week.  His brain MRI in the beginning of January 2016 showed improvement.  However his PET scan in January 2016 showed worsening in the liver lesion.    We tested his tumor for PDL-1 mutation and he did have favorable mutation for which pembrolizumab would be a targeted agent. He started on that in the end of January 2016.  He was noted to be hypothyroid after 3 cycles. Started on synthroid. He did notice depression which is better after synthroid. In August 2016 his PET scan showed progression. He was started on  Opdivo. He did fine initially but then later on in October he started to have pneumonitis-type of picture.  It was felt to be secondary to Opdivo.    He was put on some steroid Opdivo was stopped.    He was admitted at [facility] second week of December 2016 with shortness of breath.  He was found to have pulmonary embolism as well as his CT scan showed progression of his lung cancer.  We started him on palliative chemotherapy with carboplatin and etoposide in December and he got 3 cycles of it.  He needed Neulasta.  He had a rough time after the third cycle.  He was admitted with pneumonia and other and problems.  In fact he needed to be intubated and was on pressors.  He has recovered from that.  He has been on observation since late February 2017.    Due to persistent disease on PET scan, started on Abraxane in June. He was admitted with RUQ pain and found to have acute cholecystitis and on July 4th he had his gall bladder removed. Feels better now. Resumed treatment. Noticing increased wheezing off and on.  He has finished 3 cycles of it.  His CT scan in the in September showed that he might not have had any good response to the treatment.  Since that time he has had another episode or 2 where he has admitted in the hospital with pain control issues.  He is also not eating very well.    Most recently Dr. Jackson treated 1 of his brain metastases which seems to be getting worse on the MRI.  Dr. Jackson treated him with a single fraction of radiation by CyberKnife.  He handled the CyberKnife treatment well.  His Abraxane has been on hold since September 2017.  I did send his tissue for strata testing however it did not return any targetable mutation.  He was referred to [facility] for evaluation for any clinical trials.  He has met with them and there is a clinical trial for which he might be eligible but the oncologist who he saw at [facility] recommend that he should probably proceed with TECCENTRIQ  since there will be a delay in getting started on that clinical study.  He started that last week of November 2017. He seems to have tolerated that well. He has had 2 doses. Comes in for f/u. He is quite weak. C/o pain in RUQ. Appetite is poor. Having some constipation and urine is dark in color.  His liver function tests were elevated so we did an ultrasound which showed that he had progression of his liver metastases.    Past Medical History     Bronchitis, lung cancer, renal insufficiency    Family History   Problem Relation Age of Onset     Breast cancer Mother 80     No Medical Problems Father      No Medical Problems Brother      Lung cancer Brother 60     Social History   Substance Use Topics     Smoking status: Never Smoker     Smokeless tobacco: Never Used     Alcohol use No       Stage      Lung Cancer    Primary site: Lung (Right)    Clinical: Stage IIIA (T2b, N2, M1b) signed by Nas Hoyos MD on 8/20/2015 10:08 AM    Summary: Stage IIIA (T2b, N2, M1b)     Treatment History     1. September 2012 cisplatin and -16 along with concurrent radiation therapy for 2 cycles.  2. Weekly carboplatin and Taxol along with radiation therapy in June 2013.  3. March 2014 topotecan day 1 through 5 every 3 weeks for 6 cycles  4. September 2014 topotecan and Avastin ×3 cycles.  Third cycle was cut Short.  5. December 2014 Avastin 15 mg/kg every 3 weeks maintenance treatment.  6. August 2015 Avastin and Alimta.  7. Developed brain metastases in September 2015 treated with CyberKnife.  8. November 2015 started on topotecan with Avastin.  PET scan in January 2016 showed progression.  9. February 2016 started on pembrolizumab.  10. Opdivo started in August 15, 2016.  11. Carboplatin and etoposide 12/05/16 - 02/20/17 completed 3 cycles   12.  Abraxane with Avastin June 2017-September 2017. CT shows slight progression.  13.       Tecentriq November 2017.    Review of Systems   Constitutional  Constitutional (WDL):  Exceptions to WDL  Fatigue: Fatigue not relieved by rest - Limiting instrumental ADL (to severe)  Weight Loss: to <10% from baseline, intervention not indicated (down 4 lbs)  Neurosensory  Neurosensory (WDL): Exceptions to WDL  Ataxia: Asymptomatic, clinical or diagnostic observations only, intervention not indicated  Cardiovascular  Cardiovascular (WDL): Exceptions to WDL  Edema: Yes  Edema Limbs: 5 - 10% inter-limb discrepancy in volume or circumference at point of greatest visible difference, swelling or obscuration of anatomic architecture on close inspection (feet)  Pulmonary  Cough: Mild symptoms, nonprescription intervention indicated  Dyspnea: Shortness of breath at rest, limiting self care ADL (on 2L O2)  Hypoxia: Decreased oxygen saturation with exercise (e.g., pulse oximeter <88%), intermittent supplemental oxygen    Gastrointestinal  Gastrointestinal (WDL): Exceptions to WDL  Anorexia: Oral intake altered without significant weight loss or malnutrition, oral nutritional supplements indicated  Dysphagia: Symptomatic and altered eating/swallowing  Dry Mouth: Symptomatic (e.g., dry or thick saliva) without significant dietary alteration, unstimulated saliva flow >0.2 ml/min  Genitourinary  Genitourinary (WDL): Exceptions to WDL (darker urine with odor)  Integumentary  Integumentary (WDL): Exceptions to WDL  Alopecia: Hair loss of up to 50% of normal for that individual that is not obvious from a distance but only on close inspection, a different hair style may be required to cover the hair loss but it does not require a wig or hair piece to camouflage  Patient Coping  Patient Coping: Accepting;Sadness  ECOG Performance   1  Pain Status  Currently in Pain: Yes  Accompanied by  Accompanied by: Family Member (father)      Vital Signs     Vitals:    01/15/18 1406   BP: 120/78   Pulse: (!) 134   Temp: 97.9  F (36.6  C)   SpO2: 96%       Physical Exam     GENERAL: He looks quite weak. Cooperative in conversation.    HEENT: pupils are equal, round and reactive.  He has obvious jaundice.  Oral mucosa is moist and intact.  RESP:Chest symmetric. Regular respiratory rate. No stridor.  He is on nasal cannula oxygen.  ABD: Nondistended, soft.  EXTREMITIES: No lower extremity edema.   NEURO: non focal. Alert and oriented x3.   PSYCH: He looks slightly depressed.  SKIN: warm dry intact       Lab Results     Results for orders placed or performed in visit on 01/08/18   Comprehensive Metabolic Panel   Result Value Ref Range    Sodium 137 136 - 145 mmol/L    Potassium 3.7 3.5 - 5.0 mmol/L    Chloride 89 (L) 98 - 107 mmol/L    CO2 36 (H) 22 - 31 mmol/L    Anion Gap, Calculation 12 5 - 18 mmol/L    Glucose 99 70 - 125 mg/dL    BUN 11 8 - 22 mg/dL    Creatinine 0.66 (L) 0.70 - 1.30 mg/dL    GFR MDRD Af Amer >60 >60 mL/min/1.73m2    GFR MDRD Non Af Amer >60 >60 mL/min/1.73m2    Bilirubin, Total 3.1 (H) 0.0 - 1.0 mg/dL    Calcium 9.5 8.5 - 10.5 mg/dL    Protein, Total 7.0 6.0 - 8.0 g/dL    Albumin 2.3 (L) 3.5 - 5.0 g/dL    Alkaline Phosphatase 495 (H) 45 - 120 U/L    AST 77 (H) 0 - 40 U/L    ALT 57 (H) 0 - 45 U/L   HM1 (CBC with Diff)   Result Value Ref Range    WBC 7.0 4.0 - 11.0 thou/uL    RBC 3.69 (L) 4.40 - 6.20 mill/uL    Hemoglobin 9.3 (L) 14.0 - 18.0 g/dL    Hematocrit 30.3 (L) 40.0 - 54.0 %    MCV 82 80 - 100 fL    MCH 25.2 (L) 27.0 - 34.0 pg    MCHC 30.7 (L) 32.0 - 36.0 g/dL    RDW 17.1 (H) 11.0 - 14.5 %    Platelets 214 140 - 440 thou/uL    MPV 9.6 8.5 - 12.5 fL    Neutrophils % 78 (H) 50 - 70 %    Lymphocytes % 11 (L) 20 - 40 %    Monocytes % 11 (H) 2 - 10 %    Eosinophils % 0 0 - 6 %    Basophils % 0 0 - 2 %    Neutrophils Absolute 5.4 2.0 - 7.7 thou/uL    Lymphocytes Absolute 0.7 (L) 0.8 - 4.4 thou/uL    Monocytes Absolute 0.8 0.0 - 0.9 thou/uL    Eosinophils Absolute 0.0 0.0 - 0.4 thou/uL    Basophils Absolute 0.0 0.0 - 0.2 thou/uL     *Note: Due to a large number of results and/or encounters for the requested time period, some  results have not been displayed. A complete set of results can be found in Results Review.         Distress Assessment       Distress Assessment Score: 6     Imaging Results       Us Abdomen Limited    Result Date: 1/9/2018  US ABDOMEN LIMITED 1/8/2018 3:08 PM INDICATION: elevated bilirubin and alk phos. r/o biliary obstruction. COMPARISON: CT 11/22/2017 FINDINGS: GALLBLADDER: Removed BILE DUCTS: No bile duct dilation. The common bile duct measures 3 mm. LIVER: Multiple large hyperechoic masses throughout the liver. This has progressed since CT 7/12/2017. The largest in segment IVb measures 7 cm, previously 5.5 cm. RIGHT KIDNEY: No hydronephrosis. PANCREAS: Not imaged in detail on this limited study. No incidental abnormalities. No ascites in the right upper quadrant.     CONCLUSION: 1.  Progression of multiple hepatic masses since CT 11/22/2017 2.  No bile duct dilatation.    Total time spent was 40 minutes, more than half of it was in face-to-face counseling regarding disease state, treatment, side effects and management.        Signed by: Nas Hoyos MD

## 2021-06-15 NOTE — PROGRESS NOTES
[facility] Hematology and Oncology Progress Note    Patient: Jack Ricketts  MRN: [id number]  Date of Service:  2/5/2018           Reason for visit      1. Non-small cell lung cancer, unspecified laterality    2. Cancer associated pain    3. Cancer related pain    4. Hypothyroidism due to medication         Assessment     1. Jack is very pleasant 32 y.o. gentleman with  non-small cell type of a lung cancer with neuroendocrine differentiation initially stage IIIB and later on developed liver metastases, treated with concurrent chemoradiation therapy with cisplatin and etoposide  then given more radiation and weekly carboplatinum and Taxol.  He had recurrence documented February 2014 and has received 6 cycles of topotecan. In December 2014 started on maintenance Avastin.  PET scan in March 2015 showed questionable recurrence in the liver.  In August 2015 Biopsy confirmed NSCLC with neuroendocrine differentiation. Started on Alimta and Avastin.  He developed brain metastases September 2015 while on that.  Treatment changed to topotecan with Avastin.  In January 2016 PET scan showed progression again and at that time he got started on on pembrolizumab.  PET scan in August 2016 showed slight worsening in the right hilar area.  So in August 2016 he started on Opdivo.  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  He was actually on treatment break recovering from pneumonitis that he got admitted at [facility] with pulmonary embolism.  His CT scan showed progression of his lung cancer.  In December 2016 started on carboplatin and etoposide.  3 cycles.  Septic episodes after that needing intubation and pressors.  In the beginning of April 2017 came in with polyarthritis and on prednisone again.  In June 2017 started on ABraxane. Tolerated it well.  In July 2017 he had acute cholecystitis and needed his gall bladder removed.  We delayed his treatment  for about a week and then resumed treatment.  So far he has had 3 cycles of it.  He is having some wheezing and coughing.  He is using nebulizers etc.  He does get some coughing spells which can be pretty distressing for him.  No hemoptysis or blood-tinged sputum normally.  Recently seen by [facility].  He might be eligible for a clinical study later on.  Recommendation from [facility] oncologist was that he should start on TECCENTRIQ until he is cleared for that study. He started that on November 21, 2017. So far tolerating it OK.  However the ultrasound of the liver showing that he has progression of liver metastases.  Clinically his jaundice does not look like it has gotten worse.  However he has gotten weaker.  He is now having hard time speaking most likely secondary to recurrent laryngeal nerve involvement by the tumor.  2. Brain metastases which have been treated with CyberKnife l. MRI report in August 2017 was suggestive of radiation necrosis.  MRI now showing Left cerebral lesion growing in size and intensity.  Recently in September 2017 had CyberKnife therapy to that..  3. Elevated LFT's. Concerning for progression. Need to r/o biliary obstruction from non malignant reasons.  4. Pulmonary embolism was on Lovenox.  Currently on no anticoagulation.  His current auscultation findings suggest that he may either have collapsed lung or significant pleural effusion on his right side.  5. Hypothyroidism due to Pembrolizumab. On replacement.  6. C. difficile colitis diagnosed in April 2017.  7.   Adrenal insufficiency diagnosed in May 2017.    Plan     1. I had very long discussion with Jack and his father.  I again discussed with him that as best as I can tell there is no good treatment option that is available to him.  I would recommend hospice care.  His father is agreeable it.  However Jack is not sure if he wants to do that.  He is still holding out hope that some magical treatment might be out there.  He in  fact has not even told his girlfriend who is living with him that he is not getting any more treatments.  2. Continue on prednisone  replacement therapy.    3. I refilled his pain medications and other medications.  4.   We will follow-up with him in the next 3-4 weeks timeframe.  I also urged him to call us back if his condition worsens.    Stage      Lung Cancer    Primary site: Lung (Right) small cell as well as non-small cell lung cancer adenocarcinoma    Clinical: Stage IIIA (T2b, N2, M0) signed by Nas Hoyos MD on 9/26/2014  1:17 PM    Summary: Stage IIIA (T2b, N2, M0)      History     Jack Ricketts is a very pleasant 32 y.o. old male with a history of what was thought to be small cell lung cancer, limited stage, in 09/2012 located in the mediastinum measuring 10 cm.  Biopsy confirmed CK7 positive, synaptophysin positive chromogranin expressing cells.  Initial treatment involved chemoradiation therapy with cisplatin and -16.  Post treatment PET scan showed only a minimal response.  Rebiopsy revealed a component of nonsmall cell lung cancer as well, EGFR and ALK-1 mutation negative.  He was referred to Dr. Jose Amezcua [facility] who recommended Taxotere chemotherapy.  The patient opted for radiation therapy (CyberKnife) with radiosensitizing weekly carboplatin and Taxol chemotherapies initiated in June 2013.  He tolerated treatment quite well.  He had complete response to the treatment.       February, 2014, PET scan (compared to prior PET scan in November, 2013) revealed subcarinal node hypermetabolism compatible with active metastasis. This was also biopsied and proven to be malignant and suggestive of small cell type of cancer.  We started him on palliative topotecan chemotherapy day 1 through 5 every 3 weeks.  Cycle one was initiated on 03/04/2014 and he finished 6 cycles of that.  His PET scan after 3 cycles showed minimal response then after 6 cycles his PET scan showed continued  but incomplete response.  He did need some transfusion after the fifth cycle.    After couple months break was resumed his treatment with topotecan and Avastin.  He had 3 cycles .  His PET scan after the 2nd cycle had shown complete response.  He started on that his third cycle but could not completed because of respiratory infections.  After that in December 2014 he started on Avastin as a single agent for maintenance.  He has been on that until March 2015 when PET scan that actually showed a questionable uptake in his liver as well as the subcarinal lymph node.  He got a CT-guided biopsy of the liver lesion which was non diagnostic. At the time of the biopsy they had trouble locating the lesion.  Then in August 2015 the liver lesions got bigger and we biopsied again and came back positive for neuroendocrine carcinoma of lung origin.  He was started on treatment with Alimta and Avastin.  However he developed brain metastases in September.  Treated with CyberKnife therapy.  We change his treatment to topotecan and Avastin.  He has had couple cycles of that.  He became quite anemic and needed transfusion last week.  His brain MRI in the beginning of January 2016 showed improvement.  However his PET scan in January 2016 showed worsening in the liver lesion.    We tested his tumor for PDL-1 mutation and he did have favorable mutation for which pembrolizumab would be a targeted agent. He started on that in the end of January 2016.  He was noted to be hypothyroid after 3 cycles. Started on synthroid. He did notice depression which is better after synthroid. In August 2016 his PET scan showed progression. He was started on Opdivo. He did fine initially but then later on in October he started to have pneumonitis-type of picture.  It was felt to be secondary to Opdivo.    He was put on some steroid Opdivo was stopped.    He was admitted at [facility] second week of December 2016 with shortness of breath.  He was  found to have pulmonary embolism as well as his CT scan showed progression of his lung cancer.  We started him on palliative chemotherapy with carboplatin and etoposide in December and he got 3 cycles of it.  He needed Neulasta.  He had a rough time after the third cycle.  He was admitted with pneumonia and other and problems.  In fact he needed to be intubated and was on pressors.  He has recovered from that.  He has been on observation since late February 2017.    Due to persistent disease on PET scan, started on Abraxane in June. He was admitted with RUQ pain and found to have acute cholecystitis and on July 4th he had his gall bladder removed. Feels better now. Resumed treatment. Noticing increased wheezing off and on.  He has finished 3 cycles of it.  His CT scan in the in September showed that he might not have had any good response to the treatment.  Since that time he has had another episode or 2 where he has admitted in the hospital with pain control issues.  He is also not eating very well.    Most recently Dr. Jackson treated 1 of his brain metastases which seems to be getting worse on the MRI.  Dr. Jackson treated him with a single fraction of radiation by CyberKnife.  He handled the CyberKnife treatment well.  His Abraxane has been on hold since September 2017.  I did send his tissue for strata testing however it did not return any targetable mutation.  He was referred to [facility] for evaluation for any clinical trials.  He has met with them and there is a clinical trial for which he might be eligible but the oncologist who he saw at [facility] recommend that he should probably proceed with TECCENTRIQ since there will be a delay in getting started on that clinical study.  He started that last week of November 2017. He seems to have tolerated that well. He has had 2 doses. Comes in for f/u. He is quite weak. C/o pain in RUQ. Appetite is poor. Having some constipation and urine is dark in color.  His  liver function tests were elevated so we did an ultrasound which showed that he had progression of his liver metastases.  This was in January 2018.  I talked to him about comfort care.  He was not ready to sign on to hospice at that point.    Comes in today for scheduled follow-up.  He has now lost his voice.  He is gotten weak and according to his father he has fallen once or twice.  He lives with his girlfriend.  His pain is for most part under control.    Past Medical History     Bronchitis, lung cancer, renal insufficiency    Family History   Problem Relation Age of Onset     Breast cancer Mother 80     No Medical Problems Father      No Medical Problems Brother      Lung cancer Brother 60     Social History   Substance Use Topics     Smoking status: Never Smoker     Smokeless tobacco: Never Used     Alcohol use No       Stage      Lung Cancer    Primary site: Lung (Right)    Clinical: Stage IIIA (T2b, N2, M1b) signed by Nas Hoyos MD on 8/20/2015 10:08 AM    Summary: Stage IIIA (T2b, N2, M1b)     Treatment History     1. September 2012 cisplatin and -16 along with concurrent radiation therapy for 2 cycles.  2. Weekly carboplatin and Taxol along with radiation therapy in June 2013.  3. March 2014 topotecan day 1 through 5 every 3 weeks for 6 cycles  4. September 2014 topotecan and Avastin ×3 cycles.  Third cycle was cut Short.  5. December 2014 Avastin 15 mg/kg every 3 weeks maintenance treatment.  6. August 2015 Avastin and Alimta.  7. Developed brain metastases in September 2015 treated with CyberKnife.  8. November 2015 started on topotecan with Avastin.  PET scan in January 2016 showed progression.  9. February 2016 started on pembrolizumab.  10. Opdivo started in August 15, 2016.  11. Carboplatin and etoposide 12/05/16 - 02/20/17 completed 3 cycles   12.  Abraxane with Avastin June 2017-September 2017. CT shows slight progression.  13.       Tecentriq November 2017.    Review of Systems    Constitutional  Constitutional (WDL): Exceptions to WDL  Fatigue: Fatigue not relieved by rest, limiting self care ADL  Weight Loss: to <10% from baseline, intervention not indicated (down 8 lbs)  Neurosensory  Neurosensory (WDL): Exceptions to WDL  Ataxia: Moderate symptoms, limiting instrumental ADL (using a wheelchair)  Cardiovascular  Cardiovascular (WDL): Exceptions to WDL (pulse 118)  Edema: Yes  Edema Limbs: Grade 2 (feet and ankles and up legs)  Pulmonary  Cough: Moderate symptoms, medical intervention indicated, limiting instrumental ADL (with green phlem)  Dyspnea: Shortness of breath at rest, limiting self care ADL (on 1.5 L O2)  Hypoxia: Decreased oxygen saturation with exercise (e.g., pulse oximeter <88%), intermittent supplemental oxygen    Gastrointestinal  Gastrointestinal (WDL): Exceptions to WDL  Anorexia: Oral intake altered without significant weight loss or malnutrition, oral nutritional supplements indicated  Constipation: Persistent symptoms with regular use of laxatives or enemas, limiting instrumental ADL  Dysphagia: Symptomatic and altered eating/swallowing  Nausea: Loss of appetite without alteration in eating habits  Dry Mouth: Symptomatic (e.g., dry or thick saliva) without significant dietary alteration, unstimulated saliva flow >0.2 ml/min  Genitourinary  Genitourinary (WDL): All genitourinary elements are within defined limits  Integumentary  Integumentary (WDL): Exceptions to WDL (dry skin)  Alopecia: Hair loss of up to 50% of normal for that individual that is not obvious from a distance but only on close inspection, a different hair style may be required to cover the hair loss but it does not require a wig or hair piece to camouflage  Patient Coping  Patient Coping: Accepting;Sadness  ECOG Performance   1  Pain Status  Currently in Pain: Yes  Accompanied by  Accompanied by: Family Member (father)      Vital Signs     Vitals:    02/05/18 1536   BP: 105/62   Pulse: (!) 118   Temp:  98.1  F (36.7  C)   SpO2: 98%       Physical Exam     GENERAL: He looks quite weak. Cooperative in conversation.   HEENT: pupils are equal, round and reactive.  He has minimal jaundice.  Oral mucosa is moist and intact.  RESP:Chest symmetric. Regular respiratory rate. No stridor.  He is on nasal cannula oxygen.  ABD: Nondistended, soft.  EXTREMITIES: No lower extremity edema.   NEURO: non focal. Alert and oriented x3.   PSYCH: He looks slightly depressed.  SKIN: warm dry intact       Lab Results     Results for orders placed or performed in visit on 01/08/18   Comprehensive Metabolic Panel   Result Value Ref Range    Sodium 137 136 - 145 mmol/L    Potassium 3.7 3.5 - 5.0 mmol/L    Chloride 89 (L) 98 - 107 mmol/L    CO2 36 (H) 22 - 31 mmol/L    Anion Gap, Calculation 12 5 - 18 mmol/L    Glucose 99 70 - 125 mg/dL    BUN 11 8 - 22 mg/dL    Creatinine 0.66 (L) 0.70 - 1.30 mg/dL    GFR MDRD Af Amer >60 >60 mL/min/1.73m2    GFR MDRD Non Af Amer >60 >60 mL/min/1.73m2    Bilirubin, Total 3.1 (H) 0.0 - 1.0 mg/dL    Calcium 9.5 8.5 - 10.5 mg/dL    Protein, Total 7.0 6.0 - 8.0 g/dL    Albumin 2.3 (L) 3.5 - 5.0 g/dL    Alkaline Phosphatase 495 (H) 45 - 120 U/L    AST 77 (H) 0 - 40 U/L    ALT 57 (H) 0 - 45 U/L   HM1 (CBC with Diff)   Result Value Ref Range    WBC 7.0 4.0 - 11.0 thou/uL    RBC 3.69 (L) 4.40 - 6.20 mill/uL    Hemoglobin 9.3 (L) 14.0 - 18.0 g/dL    Hematocrit 30.3 (L) 40.0 - 54.0 %    MCV 82 80 - 100 fL    MCH 25.2 (L) 27.0 - 34.0 pg    MCHC 30.7 (L) 32.0 - 36.0 g/dL    RDW 17.1 (H) 11.0 - 14.5 %    Platelets 214 140 - 440 thou/uL    MPV 9.6 8.5 - 12.5 fL    Neutrophils % 78 (H) 50 - 70 %    Lymphocytes % 11 (L) 20 - 40 %    Monocytes % 11 (H) 2 - 10 %    Eosinophils % 0 0 - 6 %    Basophils % 0 0 - 2 %    Neutrophils Absolute 5.4 2.0 - 7.7 thou/uL    Lymphocytes Absolute 0.7 (L) 0.8 - 4.4 thou/uL    Monocytes Absolute 0.8 0.0 - 0.9 thou/uL    Eosinophils Absolute 0.0 0.0 - 0.4 thou/uL    Basophils Absolute 0.0 0.0  - 0.2 thou/uL     *Note: Due to a large number of results and/or encounters for the requested time period, some results have not been displayed. A complete set of results can be found in Results Review.         Distress Assessment       Distress Assessment Score: 5     Imaging Results       Us Abdomen Limited    Result Date: 1/9/2018  US ABDOMEN LIMITED 1/8/2018 3:08 PM INDICATION: elevated bilirubin and alk phos. r/o biliary obstruction. COMPARISON: CT 11/22/2017 FINDINGS: GALLBLADDER: Removed BILE DUCTS: No bile duct dilation. The common bile duct measures 3 mm. LIVER: Multiple large hyperechoic masses throughout the liver. This has progressed since CT 7/12/2017. The largest in segment IVb measures 7 cm, previously 5.5 cm. RIGHT KIDNEY: No hydronephrosis. PANCREAS: Not imaged in detail on this limited study. No incidental abnormalities. No ascites in the right upper quadrant.     CONCLUSION: 1.  Progression of multiple hepatic masses since CT 11/22/2017 2.  No bile duct dilatation.    Total time spent was 40 minutes, more than half of it was in face-to-face counseling regarding disease state, treatment, side effects and management.        Signed by: Nas Hoyos MD

## 2021-06-16 NOTE — PROGRESS NOTES
Certificate for Medical Necessity was filled out, signed and faxed to [facility] [phone].

## 2021-06-25 NOTE — PROGRESS NOTES
Progress Notes by Carley Jackson MD at 12/6/2017  3:15 PM     Author: Carley Jackson MD Service: -- Author Type: Physician    Filed: 12/6/2017  5:03 PM Encounter Date: 12/6/2017 Status: Addendum    : Carley Jackson MD (Physician)    Related Notes: Original Note by Carley Jackson MD (Physician) filed at 12/6/2017  5:02 PM         [facility] Radiation Oncology Follow Up Note    Patient: Jack Ricketts  MRN: [id number]  Date of Service: 12/06/2017    Assessment / Impression     1. Metastasis to brain        Non-small cell lung cancer, unspecified laterality    Staging form: Lung, AJCC 7th Edition    - Clinical: Stage IV (T2b, N2, M1b) - Signed by Nas Hoyos MD on 8/20/2015    - Pathologic: No stage assigned - Unsigned  ECOG Peformance Status  ECOG Performance Status: 3  Distress Assessment Score  Distress Assessment Score: 5  Body site: Brain     Discussed palliative care and quality of life.      Plan:   Clinically declined since last seen.   Progression systemically and neurologically with a new small left temporal met. Since small,neurologically stable, and need to stop current chemo to treat,  focus on chemo now and F/u with scan mid February (or after 4 cycles of chemo completed).   Scan sooner as clinically indicated.   Has seen Sara Fischer who will f/u and  ambulatory referral to palliative care.       Face to face time 40 minutes with > 75% spent on consultation, education and coordination of care, goals of care,     Subjective:      HPI: Jack Ricketts is a 32 y.o. male with metastatic lung cancer.     Chief Complaint   Patient presents with   ? HE Cancer     Follow up with Dr. Jackson     HISTORY: Jack Ricketts was treated with stereotactic radiosurgery for metastatic lung cancer         Current Outpatient Prescriptions   Medication Sig Dispense Refill   ? acetaminophen (TYLENOL) 500 MG tablet Take 500-1,000 mg by mouth every 6 (six) hours as  needed for pain.     ? albuterol (PROAIR HFA;PROVENTIL HFA;VENTOLIN HFA) 90 mcg/actuation inhaler Inhale 2 puffs every 4 (four) hours as needed for wheezing.     ? albuterol (PROVENTIL) 2.5 mg /3 mL (0.083 %) nebulizer solution Take 2.5 mg by nebulization every 4 (four) hours as needed.      ? benzonatate (TESSALON) 100 MG capsule Take 1 capsule (100 mg total) by mouth 3 (three) times a day as neededfor cough. 60 capsule 1   ? calcium, as carbonate, (TUMS) 200 mg calcium (500 mg) chewable tablet Chew 2 tablets every 4 (four) hours as needed for heartburn.      ? cholecalciferol, vitamin D3, (VITAMIN D3) 5,000 unit Tab Take 5,000 Units by mouth daily.      ? codeine-guaiFENesin (GUAIFENESIN AC)  mg/5 mL liquid Take 5 mL by mouth 2 (two) times a day as needed for cough. 240 mL 0   ? fludrocortisone (FLORINEF) 0.1 mg tablet Take 0.5 tablets (0.05 mg total) by mouth daily. 45 tablet 1   ? Lactobacillus rhamnosus GG (CULTURELLE) 10-15 Billion cell capsule Take 1 capsule by mouth daily.     ? levothyroxine (SYNTHROID) 112 MCG tablet Take 1 tablet (112 mcg total) by mouth daily. Replaces 150 mcg dose. 90 tablet 1   ? levothyroxine (SYNTHROID) 125 MCG tablet Take 1 tablet (125 mcg total) by mouth daily. 30 tablet 0   ? multivitamin therapeutic tablet Take 1 tablet by mouth daily.     ? omeprazole (PRILOSEC) 20 MG capsule Take 20 mg by mouth daily as needed.      ? ondansetron (ZOFRAN-ODT) 4 MG disintegrating tablet Take 1 tablet (4 mg total) by mouth every 8 (eight) hours as needed for nausea. 50 tablet 1   ? oxyCODONE (OXYCONTIN) 20 mg 12 hr tablet Take 1 tablet (20 mg total) by mouth every 12 (twelve) hours. 60 tablet 0   ? oxyCODONE (OXYCONTIN) 40 mg 12 hr tablet 40 mg a.m + 20 mg afternoon + 40 mg p.m. Every 8 hour dosing. 60 tablet 0   ? oxyCODONE (ROXICODONE) 5 MG immediate release tablet Two every four hours as needed for cancer pain.  Average 10 daily. 300 tablet 0   ? polyethylene glycol (GLYCOLAX) 17  gram/dose powder Take 17 g by mouth daily as needed (for constipation).  0   ? potassium chloride SA (K-DUR,KLOR-CON) 10 MEQ tablet Take 1 tablet (10 mEq total) by mouth 2 (two) times a day. 60 tablet 1   ? predniSONE (DELTASONE) 2.5 MG tablet Take 1 tablet (2.5 mg total) by mouth every evening.  0   ? predniSONE (DELTASONE) 2.5 MG tablet Take 5 mg by mouth every morning.     ? sertraline (ZOLOFT) 50 MG tablet Take 1 tablet (50 mg total) by mouth daily. 30 tablet 2   ? sulfamethoxazole-trimethoprim (SEPTRA DS) 800-160 mg per tablet Take 1 tablet by mouth 3 (three) times a week. Take on Monday, Wednesday, and Friday.       No current facility-administered medications for this visit.      Facility-Administered Medications Ordered in Other Visits   Medication Dose Route Frequency Provider Last Rate Last Dose   ? heparin 100 unit/mL lockflush (PF) porcine 300-600 Units  300-600 Units Intravenous PRN Derrick Corado CNP   600 Units at 12/06/16 1502   ? heparin 100 unit/mL lockflush (PF) porcine 300-600 Units  300-600 Units Intravenous PRN Derrick Corado CNP   600 Units at 12/06/17 1527   ? heparin 100 unit/mL lockflush (PF) porcine 600 Units  600 Units Intravenous PRN Nas Hoyos MD   600 Units at 01/15/16 1445   ? magnesium sulfate in water 4 gram/50 mL (8 %) IVPB 4 g  4 g Intravenous Once Derrick Corado CNP       ? sodium chloride 0.9 % flush 10 mL (NS)  10 mL Intravenous PRN Nas Hoyos MD   20 mL at 01/15/16 1445   ? sodium chloride 0.9 % flush 10 mL (NS)  10 mL Intravenous PRN Derrick Corado CNP   10 mL at 12/06/16 1505   ? sodium chloride 0.9 % flush 10 mL (NS)  10 mL Intravenous PRN Derrick Corado CNP   10 mL at 12/06/17 1527   ? sodium chloride 0.9% 1,000 mL infusion   Intravenous Continuous Derrick Corado CNP   Stopped at 12/06/17 1527       The following portions of the patient's history were reviewed and updated as appropriate: allergies, current medications, past family  history, past medical history, past social history, past surgical history and problem list.    Review of Systems    Constitutional  Constitutional (WDL): Exceptions to WDL  Fatigue: Fatigue not relieved by rest - Limiting instrumental ADL  Weight Loss: to <10% from baseline, intervention not indicated  Hot flashes/Night Sweats: Mild symptoms, no intervention needed  Neurosensory  Neurosensory (WDL): Exceptions to WDL  Dizziness: Mild unsteadiness or sensation of movement (occasionally)  Eye        Eye Disorder (WDL): Assessment not pertinent to visit  Ear     Cardiovascular  Cardiovascular (WDL): All cardiovascular elements are within defined limits  Pulmonary  Respiratory (WDL): Exceptions to WDL  Cough: Mild symptoms, nonprescription intervention indicated  Dyspnea: Shortness of breath at rest, limiting self care ADL  Gastrointestinal  Gastrointestinal (WDL): Exceptions to WDL  Anorexia: Associated with significant weight loss or malnutrition (e.g., inadequate oral caloric and/or fluid intake), tube feeding or TPN indicated  Nausea: Loss of appetite without alteration in eating habits  Dysphagia: Symptomatic, able to eat regular diet (needs to take small bites)  Dry Mouth: Symptomatic (e.g., dry or thick saliva) without significant dietary alteration, unstimulated saliva flow >0.2 ml/min  Genitourinary  Genitourinary (WDL): All genitourinary elements are within defined limits              Musculoskeletal              Musculoskeletal and Connetive Tissue Disorders (WDL): Exceptions to WDL  Bone Pain: Mild pain (right chest and back)  Muscle Weakness : Symptomatic, evident on physical exam, limiting instrumental ADL (generalized)  Integumentary  Integumentary (WDL): Exceptions to WDL  Alopecia: Hair loss of up to 50% of normal for that individual that is not obvious from a distance but only on close inspection, a different hair style may be required to cover the hair loss but it does not require a wig or hair piece  to camouflage  Patient Coping  Patient Coping: Other (Comment);Accepting (tired a lot)  Pain              Currently in Pain: Yes  Pain Score (Initial OR Reassessment): 4  Pain Frequency: Constant/continuous  Location: chest and back  Pain Characteristics : Aching  Pain Intervention(s): Home medication  Response to Interventions: helps  Accompanied by  Accompanied by: Alone    Objective:     Physical Exam    Vitals:    12/06/17 1545   BP: 95/62   Pulse: (!) 135     GENERAL: pale and fatigued.  Cooperative in conversation.   HEENT: pupils are equal, round and reactive. Oromucosa moist.  RESP: normal respiratory effort withNC O2 .   CV: Regular, rate and rhythm. No murmurs.  ABD: soft, nontender..   MUSCULOSKELETAL: no palpable points of tenderness   NEURO: non focal. Alert and oriented x3. Gait not tested, speech fluent.   PSYCH: depressed, fatigued. .  SKIN: warm dry intact   EXTREMITIES: no UE edema         Recent Labs:   Recent Results (from the past 168 hour(s))   Basic Metabolic Panel   Result Value Ref Range    Sodium 139 136 - 145 mmol/L    Potassium 3.5 3.5 - 5.0 mmol/L    Chloride 94 (L) 98 - 107 mmol/L    CO2 34 (H) 22 - 31 mmol/L    Anion Gap, Calculation 11 5 - 18 mmol/L    Glucose 106 70 - 125 mg/dL    Calcium 9.6 8.5 - 10.5 mg/dL    BUN 7 (L) 8 - 22 mg/dL    Creatinine 0.77 0.70 - 1.30 mg/dL    GFR MDRD Af Amer >60 >60 mL/min/1.73m2    GFR MDRD Non Af Amer >60 >60 mL/min/1.73m2   TSH   Result Value Ref Range    TSH 10.47 (H) 0.30 - 5.00 uIU/mL   HM1 (CBC with Diff)   Result Value Ref Range    WBC 6.9 4.0 - 11.0 thou/uL    RBC 3.63 (L) 4.40 - 6.20 mill/uL    Hemoglobin 9.8 (L) 14.0 - 18.0 g/dL    Hematocrit 31.6 (L) 40.0 - 54.0 %    MCV 87 80 - 100 fL    MCH 27.0 27.0 - 34.0 pg    MCHC 31.0 (L) 32.0 - 36.0 g/dL    RDW 14.8 (H) 11.0 - 14.5 %    Platelets 296 140 - 440 thou/uL    MPV 9.3 8.5 - 12.5 fL    Neutrophils % 74 (H) 50 - 70 %    Lymphocytes % 15 (L) 20 - 40 %    Monocytes % 10 2 - 10 %     Eosinophils % 1 0 - 6 %    Basophils % 0 0 - 2 %    Neutrophils Absolute 5.1 2.0 - 7.7 thou/uL    Lymphocytes Absolute 1.0 0.8 - 4.4 thou/uL    Monocytes Absolute 0.7 0.0 - 0.9 thou/uL    Eosinophils Absolute 0.1 0.0 - 0.4 thou/uL    Basophils Absolute 0.0 0.0 - 0.2 thou/uL   Magnesium   Result Value Ref Range    Magnesium 1.1 (L) 1.8 - 2.6 mg/dL       Imaging: Imaging results 30 days: Mr Brain With Without Contrast    Result Date: 11/27/2017  HEAD MRI WITHOUT AND WITH IV CONTRAST 11/27/2017 11:44 AM INDICATION: Follow up metastatic lung cancer. Previous CyberKnife therapy. TECHNIQUE: Head MRI without and with intravenous contrast. Perfusion was attempted however was technically inadequate. CONTRAST: Gadavist 10 mL. COMPARISON: MRI 9/18/2017. FINDINGS: No evidence for acute or subacute infarct on the diffusion sequence. No acute hemorrhage. Some chronic blood products associated with the right frontal and right periventricular metastatic deposits. No new hydrocephalus. Previously noted anterior right frontal metastatic deposit is again seen appears minimally larger measuring approximately 12 mm x 10 mm. Stable mild surrounding FLAIR signal change. The previously noted irregularly enhancing right periventricular lesion has regressed in size and contrast enhancement mildly from previous study. Stable surrounding FLAIR changes. Previously noted left periventricular enhancing lesion has decreased moderately in size in the interval and shows T1 hyperintensity on precontrast images. A small 2 to 3 mm left frontal enhancing lesion is stable from prior. A 6.5 mm superficial enhancing lesion is now identified along the posterior left temporal lobe cortex seen on series 17 image 9. A 2 mm enhancing lesion along the atrial region of the right lateral ventricle is also now identified. No new posterior fossa lesion. The major intracranial vascular flow voids are maintained. Mastoids clear. Paranasal sinuses grossly clear.  Grossly normal orbits.     CONCLUSION: 1.  Again noted are several intracranial metastatic deposits. The previously noted irregularly enhancing deposit in the right periventricular region has decreased mildly in size from 9/18/2017. Stable surrounding FLAIR change. 2.  A peripherally enhancing approximately 12 mm enhancing lesion in the anterior superficial right frontal lobe is similar to minimally larger than on prior study with stable FLAIR change. 3.  A small enhancing deposit in the left periventricular region seen previously has decreased moderately in size and now shows T1 hyperintensity. 4.  There is now a 6 mm enhancing lesion along the posterolateral left temporal lobe superficially as well as a 2 mm enhancing lesion in the atrium of the right lateral ventricle. Both of these lesions were barely perceptible on the prior study and have enlarged. 5.  Stable 2-3 mm enhancing lesion in the left frontal lobe. 6.  No severe edema or mass effect with any of these lesions. 7.  No new infarct, hydrocephalus or acute hemorrhage. Some chronic hemorrhage with the right frontal and right periventricular lesions again seen.    Ct Chest Abdomen Pelvis Without Oral With Iv Contrast    Result Date: 11/22/2017  CT CHEST, ABDOMEN, AND PELVIS 11/22/2017 1:25 PM      INDICATION: Neoplasm: abdomen, metastatic, rx monitor or f/u TECHNIQUE: CT chest, abdomen, and pelvis. Dose reduction techniques were used. IV CONTRAST: Iohexol (Omni) 100 mL COMPARISON: CTA chest 11/1/2017 and 9/21/2017. CT abdomen and pelvis 9/14/2017. MRCP 7/4/2017. FINDINGS: CHEST: The entire right lung is now densely consolidated which makes measurement of the perihilar mass difficult. The mass is probably mildly larger compared to the recent CTA chest 11/1/2017. As before, the mass encases and attenuates the airways and pulmonary arteries of the right lung and extends into the paratracheal and subcarinal mediastinum. Fiducial markers are unchanged in  positions. Necrotic lymph nodes persist in the left supraclavicular and prevascular mediastinal stations as well as near the distal esophagus. A prevascular lymph node has decreased in size to 2.2 x 1.6 cm, previously 3.3 x 2.3 cm (image 10 of series 2). The left supraclavicular node is now 2.3 x 2.0 cm, previously 2.1 x 1.7 cm (image 4 of series 2). A lymph node near the distal esophagus is now 1.5 x 1.4 cm, previously 1.2 x 1.0 cm. Perihilar nodularity segment of the left lower lobe is not significantly changed. For example, a dominant nodule is now 1.6 x 1.1 cm, previously 1.5 x 1.1 cm (e.g. images 33-40 of series 3). Left internal jugular venous Port-A-Cath terminates in the right atrium. Small-to-moderate pericardial effusion has mildly increased in volume. Right pleural effusion is small.  ABDOMEN: Numerous hepatic metastases have significantly increased in size and number. For example a dominant mass at the dome now measures 3.8 x 3.7 cm, previously 3.1 x 3.0 cm on 11/1/2017. A mass of segment IV is now 5.5 x 4.4 cm, previously 4.2 x 3.6 cm (image 44 of series 2). Postcholecystectomy. Spleen, pancreas, adrenal glands, and kidneys are negative. PELVIS: Loops of bowel, urinary bladder, prostate gland, and rectum are negative. MUSCULOSKELETAL: Negative.     CONCLUSION: 1.  Progression of disease. 2.  Entire right lung now densely consolidated which makes measurement of the perihilar mass difficult. As before, the mass encases and attenuates the airways and pulmonary arteries of the right lung. 3.  Significant increase in size and number of hepatic metastases. 4.  A portion of the mediastinal and left supraclavicular philip metastases have enlarged. 5.  Stable cluster of nodules of the left perihilar region also suspicious for metastases. 6.  Increase in volume of small to moderate pericardial effusion.      Signed by: Carley Jackson MD MPH

## 2021-06-25 NOTE — PROGRESS NOTES
Progress Notes by Carley Jackson MD at 2017 12:43 PM     Author: Carley Jackson MD Service: -- Author Type: Physician    Filed: 2017  1:18 PM Encounter Date: 2017 Status: Signed    : Carley Jackson MD (Physician)       SBRT/SRS Treatment Summary    Patient: Jack Ricketts   MRN: [id number]  : 1985  Care Provider: Carley Jackson    Date of Service: 2017      HISTORY: Jack Ricketts was treated with stereotactic radiosurgery for metastatic lung cancer        PLAN: Discharge instructions were given and Jack knows to call if questions/issues arise. Jack will be seen in f/u in 2 months with a scan.       Signed by: Carley Jackson MD

## 2021-07-03 NOTE — ADDENDUM NOTE
Addendum Note by Carley Jackson MD at 9/25/2017  1:20 PM     Author: Carley Jackson MD Service: -- Author Type: Physician    Filed: 9/25/2017  1:20 PM Encounter Date: 9/25/2017 Status: Signed    : Carley Jackson MD (Physician)    Addended by: CARLEY JACKSON on: 9/25/2017 01:20 PM        Modules accepted: Orders

## 2021-07-03 NOTE — ADDENDUM NOTE
Addendum Note by Yoon Cervantes RN at 1/8/2018  3:01 PM     Author: Yoon Cervantes RN Service: -- Author Type: Registered Nurse    Filed: 1/8/2018  3:01 PM Encounter Date: 1/8/2018 Status: Signed    : Yoon Cervantes RN (Registered Nurse)    Addended by: YOON CERVANTES on: 1/8/2018 03:01 PM        Modules accepted: Orders, SmartSet

## 2021-07-14 PROBLEM — J96.02 ACUTE RESPIRATORY FAILURE WITH HYPOXIA AND HYPERCAPNIA (H): Status: RESOLVED | Noted: 2017-01-01 | Resolved: 2017-01-01

## 2021-07-14 PROBLEM — J96.01 ACUTE RESPIRATORY FAILURE WITH HYPOXIA AND HYPERCAPNIA (H): Status: RESOLVED | Noted: 2017-01-01 | Resolved: 2017-01-01

## 2021-08-03 PROBLEM — A41.9 SEPSIS DUE TO UNDETERMINED ORGANISM (H): Status: RESOLVED | Noted: 2017-01-01 | Resolved: 2017-01-01

## 2021-08-03 PROBLEM — D61.810 ANTINEOPLASTIC CHEMOTHERAPY INDUCED PANCYTOPENIA (H): Status: RESOLVED | Noted: 2017-01-01 | Resolved: 2017-01-01

## 2021-08-03 PROBLEM — A41.9 SEVERE SEPSIS (H): Status: RESOLVED | Noted: 2017-01-01 | Resolved: 2017-01-01

## 2021-08-03 PROBLEM — A41.9 SEPSIS (H): Status: RESOLVED | Noted: 2017-01-01 | Resolved: 2017-01-01

## 2021-08-03 PROBLEM — R65.20 SEVERE SEPSIS (H): Status: RESOLVED | Noted: 2017-01-01 | Resolved: 2017-01-01

## 2021-08-03 PROBLEM — T45.1X5A ANTINEOPLASTIC CHEMOTHERAPY INDUCED PANCYTOPENIA (H): Status: RESOLVED | Noted: 2017-01-01 | Resolved: 2017-01-01
